# Patient Record
Sex: MALE | Race: WHITE | Employment: FULL TIME | URBAN - NONMETROPOLITAN AREA
[De-identification: names, ages, dates, MRNs, and addresses within clinical notes are randomized per-mention and may not be internally consistent; named-entity substitution may affect disease eponyms.]

---

## 2017-01-19 RX ORDER — LEVETIRACETAM 1000 MG/1
TABLET ORAL
Qty: 60 TABLET | Refills: 5 | Status: SHIPPED | OUTPATIENT
Start: 2017-01-19 | End: 2017-01-23 | Stop reason: SDUPTHER

## 2017-01-23 ENCOUNTER — OFFICE VISIT (OUTPATIENT)
Dept: FAMILY MEDICINE CLINIC | Age: 47
End: 2017-01-23

## 2017-01-23 ENCOUNTER — TELEPHONE (OUTPATIENT)
Dept: NEUROLOGY | Age: 47
End: 2017-01-23

## 2017-01-23 VITALS
RESPIRATION RATE: 16 BRPM | WEIGHT: 243 LBS | HEIGHT: 69 IN | SYSTOLIC BLOOD PRESSURE: 118 MMHG | BODY MASS INDEX: 35.99 KG/M2 | HEART RATE: 63 BPM | OXYGEN SATURATION: 99 % | TEMPERATURE: 98.3 F | DIASTOLIC BLOOD PRESSURE: 70 MMHG

## 2017-01-23 DIAGNOSIS — R56.9 SEIZURE (HCC): ICD-10-CM

## 2017-01-23 DIAGNOSIS — R56.9 SEIZURE (HCC): Primary | ICD-10-CM

## 2017-01-23 LAB
ALBUMIN SERPL-MCNC: 4.4 G/DL (ref 3.5–5.2)
ALP BLD-CCNC: 70 U/L (ref 40–130)
ALT SERPL-CCNC: 24 U/L (ref 5–41)
ANION GAP SERPL CALCULATED.3IONS-SCNC: 14 MMOL/L (ref 7–19)
AST SERPL-CCNC: 24 U/L (ref 5–40)
BILIRUB SERPL-MCNC: 0.6 MG/DL (ref 0.2–1.2)
BUN BLDV-MCNC: 13 MG/DL (ref 6–20)
CALCIUM SERPL-MCNC: 9.1 MG/DL (ref 8.6–10)
CHLORIDE BLD-SCNC: 102 MMOL/L (ref 98–111)
CO2: 27 MMOL/L (ref 22–29)
CREAT SERPL-MCNC: 0.9 MG/DL (ref 0.5–1.2)
GFR NON-AFRICAN AMERICAN: >60
GLOBULIN: 2.8 G/DL
GLUCOSE BLD-MCNC: 96 MG/DL (ref 74–109)
HCT VFR BLD CALC: 44.6 % (ref 42–52)
HEMOGLOBIN: 16 G/DL (ref 14–18)
MCH RBC QN AUTO: 33.1 PG (ref 27–31)
MCHC RBC AUTO-ENTMCNC: 35.9 G/DL (ref 33–37)
MCV RBC AUTO: 92.1 FL (ref 80–94)
PDW BLD-RTO: 13.1 % (ref 11.5–14.5)
PLATELET # BLD: 186 K/UL (ref 130–400)
PMV BLD AUTO: 11.3 FL (ref 7.4–10.4)
POTASSIUM SERPL-SCNC: 4.1 MMOL/L (ref 3.5–5)
RBC # BLD: 4.84 M/UL (ref 4.7–6.1)
SODIUM BLD-SCNC: 143 MMOL/L (ref 136–145)
TOTAL PROTEIN: 7.2 G/DL (ref 6.6–8.7)
WBC # BLD: 9.4 K/UL (ref 4.8–10.8)

## 2017-01-23 PROCEDURE — 99214 OFFICE O/P EST MOD 30 MIN: CPT | Performed by: FAMILY MEDICINE

## 2017-01-23 RX ORDER — LEVETIRACETAM 1000 MG/1
TABLET ORAL
Qty: 60 TABLET | Refills: 5 | Status: SHIPPED | OUTPATIENT
Start: 2017-01-23 | End: 2017-01-23

## 2017-01-23 RX ORDER — LEVETIRACETAM 1000 MG/1
TABLET ORAL
Qty: 60 TABLET | Refills: 5 | Status: SHIPPED | OUTPATIENT
Start: 2017-01-23 | End: 2018-02-19 | Stop reason: SDUPTHER

## 2017-01-23 ASSESSMENT — ENCOUNTER SYMPTOMS
EYES NEGATIVE: 1
RESPIRATORY NEGATIVE: 1
GASTROINTESTINAL NEGATIVE: 1
ALLERGIC/IMMUNOLOGIC NEGATIVE: 1

## 2017-01-25 LAB — KEPPRA: 2 UG/ML (ref 12–46)

## 2017-02-13 DIAGNOSIS — M19.90 OSTEOARTHRITIS, UNSPECIFIED OSTEOARTHRITIS TYPE, UNSPECIFIED SITE: ICD-10-CM

## 2017-02-13 DIAGNOSIS — K21.9 GASTROESOPHAGEAL REFLUX DISEASE WITHOUT ESOPHAGITIS: ICD-10-CM

## 2017-02-13 RX ORDER — RANITIDINE 300 MG/1
TABLET ORAL
Qty: 30 TABLET | Refills: 5 | Status: SHIPPED | OUTPATIENT
Start: 2017-02-13 | End: 2019-04-05

## 2017-02-13 RX ORDER — MELOXICAM 7.5 MG/1
TABLET ORAL
Qty: 30 TABLET | Refills: 5 | Status: SHIPPED | OUTPATIENT
Start: 2017-02-13 | End: 2019-04-05

## 2017-03-03 ENCOUNTER — TELEPHONE (OUTPATIENT)
Dept: NEUROLOGY | Age: 47
End: 2017-03-03

## 2018-02-19 ENCOUNTER — TELEPHONE (OUTPATIENT)
Dept: FAMILY MEDICINE CLINIC | Age: 48
End: 2018-02-19

## 2018-02-19 DIAGNOSIS — R56.9 SEIZURE (HCC): ICD-10-CM

## 2018-02-19 RX ORDER — LEVETIRACETAM 1000 MG/1
TABLET ORAL
Qty: 60 TABLET | Refills: 5 | Status: SHIPPED | OUTPATIENT
Start: 2018-02-19

## 2018-03-06 ENCOUNTER — OFFICE VISIT (OUTPATIENT)
Dept: FAMILY MEDICINE CLINIC | Age: 48
End: 2018-03-06

## 2018-03-06 VITALS
OXYGEN SATURATION: 96 % | WEIGHT: 224 LBS | HEIGHT: 68 IN | BODY MASS INDEX: 33.95 KG/M2 | RESPIRATION RATE: 16 BRPM | HEART RATE: 63 BPM | TEMPERATURE: 97.7 F | SYSTOLIC BLOOD PRESSURE: 120 MMHG | DIASTOLIC BLOOD PRESSURE: 78 MMHG

## 2018-03-06 DIAGNOSIS — R35.0 URINARY FREQUENCY: Primary | ICD-10-CM

## 2018-03-06 DIAGNOSIS — R56.9 SEIZURE (HCC): ICD-10-CM

## 2018-03-06 DIAGNOSIS — R35.0 URINARY FREQUENCY: ICD-10-CM

## 2018-03-06 LAB
ALBUMIN SERPL-MCNC: 4.3 G/DL (ref 3.5–5.2)
ALP BLD-CCNC: 86 U/L (ref 40–130)
ALT SERPL-CCNC: 19 U/L (ref 5–41)
ANION GAP SERPL CALCULATED.3IONS-SCNC: 15 MMOL/L (ref 7–19)
AST SERPL-CCNC: 15 U/L (ref 5–40)
BILIRUB SERPL-MCNC: 0.6 MG/DL (ref 0.2–1.2)
BUN BLDV-MCNC: 15 MG/DL (ref 6–20)
CALCIUM SERPL-MCNC: 9.2 MG/DL (ref 8.6–10)
CHLORIDE BLD-SCNC: 101 MMOL/L (ref 98–111)
CO2: 28 MMOL/L (ref 22–29)
CREAT SERPL-MCNC: 0.8 MG/DL (ref 0.5–1.2)
GFR NON-AFRICAN AMERICAN: >60
GLUCOSE BLD-MCNC: 98 MG/DL (ref 74–109)
HBA1C MFR BLD: 5 %
HCT VFR BLD CALC: 44.1 % (ref 42–52)
HEMOGLOBIN: 15.6 G/DL (ref 14–18)
MCH RBC QN AUTO: 33.2 PG (ref 27–31)
MCHC RBC AUTO-ENTMCNC: 35.4 G/DL (ref 33–37)
MCV RBC AUTO: 93.8 FL (ref 80–94)
PDW BLD-RTO: 12.4 % (ref 11.5–14.5)
PLATELET # BLD: 156 K/UL (ref 130–400)
PMV BLD AUTO: 11.3 FL (ref 9.4–12.4)
POTASSIUM SERPL-SCNC: 3.9 MMOL/L (ref 3.5–5)
PROSTATE SPECIFIC ANTIGEN: 0.69 NG/ML (ref 0–4)
RBC # BLD: 4.7 M/UL (ref 4.7–6.1)
SODIUM BLD-SCNC: 144 MMOL/L (ref 136–145)
TOTAL PROTEIN: 7.1 G/DL (ref 6.6–8.7)
WBC # BLD: 11.2 K/UL (ref 4.8–10.8)

## 2018-03-06 PROCEDURE — 99213 OFFICE O/P EST LOW 20 MIN: CPT | Performed by: FAMILY MEDICINE

## 2018-03-06 RX ORDER — LEVETIRACETAM 1000 MG/1
TABLET ORAL
Qty: 60 TABLET | Refills: 5 | Status: SHIPPED | OUTPATIENT
Start: 2018-03-06 | End: 2019-04-05

## 2018-03-06 ASSESSMENT — ENCOUNTER SYMPTOMS
GASTROINTESTINAL NEGATIVE: 1
RESPIRATORY NEGATIVE: 1
EYES NEGATIVE: 1
ALLERGIC/IMMUNOLOGIC NEGATIVE: 1

## 2018-03-06 NOTE — PROGRESS NOTES
OBJECTIVE:    Physical Exam   Constitutional: He is oriented to person, place, and time. He appears well-developed and well-nourished. HENT:   Head: Normocephalic and atraumatic. Right Ear: External ear normal.   Left Ear: External ear normal.   Nose: Nose normal.   Mouth/Throat: Oropharynx is clear and moist.   Eyes: Conjunctivae and EOM are normal. Pupils are equal, round, and reactive to light. Neck: Normal range of motion. Neck supple. Cardiovascular: Normal rate, regular rhythm, S1 normal, S2 normal, normal heart sounds, intact distal pulses and normal pulses. Pulmonary/Chest: Effort normal and breath sounds normal. No apnea. Abdominal: Soft. Normal appearance. Musculoskeletal: Normal range of motion. Neurological: He is alert and oriented to person, place, and time. He has normal strength and normal reflexes. Skin: Skin is warm, dry and intact. Psychiatric: He has a normal mood and affect. His speech is normal and behavior is normal. Judgment and thought content normal. Cognition and memory are normal.   Vitals reviewed. /78 (Site: Right Arm, Position: Sitting, Cuff Size: Medium Adult)   Pulse 63   Temp 97.7 °F (36.5 °C) (Oral)   Resp 16   Ht 5' 8\" (1.727 m)   Wt 224 lb (101.6 kg)   SpO2 96%   BMI 34.06 kg/m²      ASSESSMENT:    1. Urinary frequency overactive bladder versus BPH versus diabetes  Hemoglobin A1C    Urinalysis    PSA, Diagnostic   2. Seizure ([address] Stable  levETIRAcetam (KEPPRA) 1000 MG tablet    CBC    Comprehensive Metabolic Panel    Levetiracetam Level        PLAN:    1. Hemoglobin A1c UA and PSA. Avoid caffeine. Return to the clinic if not improved in by discontinuation of caffeine. 2. Keppra level. Refill Keppra blood work.   Follow-up in 6 months or earlier when necessary

## 2018-03-08 LAB — KEPPRA: 5 UG/ML (ref 12–46)

## 2018-05-07 ENCOUNTER — HOSPITAL ENCOUNTER (EMERGENCY)
Facility: HOSPITAL | Age: 48
Discharge: HOME | End: 2018-05-07
Admitting: EMERGENCY MEDICINE

## 2018-05-07 ENCOUNTER — APPOINTMENT (OUTPATIENT)
Dept: GENERAL RADIOLOGY | Facility: HOSPITAL | Age: 48
End: 2018-05-07

## 2018-05-07 VITALS
DIASTOLIC BLOOD PRESSURE: 61 MMHG | HEART RATE: 48 BPM | OXYGEN SATURATION: 100 % | RESPIRATION RATE: 17 BRPM | WEIGHT: 224 LBS | SYSTOLIC BLOOD PRESSURE: 110 MMHG | HEIGHT: 68 IN | BODY MASS INDEX: 33.95 KG/M2 | TEMPERATURE: 98.3 F

## 2018-05-07 DIAGNOSIS — S39.012A STRAIN OF LUMBAR REGION, INITIAL ENCOUNTER: Primary | ICD-10-CM

## 2018-05-07 PROCEDURE — 99283 EMERGENCY DEPT VISIT LOW MDM: CPT

## 2018-05-07 PROCEDURE — 25010000002 METHYLPREDNISOLONE PER 125 MG: Performed by: PHYSICIAN ASSISTANT

## 2018-05-07 PROCEDURE — 25010000002 KETOROLAC TROMETHAMINE PER 15 MG: Performed by: PHYSICIAN ASSISTANT

## 2018-05-07 PROCEDURE — 72110 X-RAY EXAM L-2 SPINE 4/>VWS: CPT

## 2018-05-07 PROCEDURE — 96372 THER/PROPH/DIAG INJ SC/IM: CPT

## 2018-05-07 RX ORDER — KETOROLAC TROMETHAMINE 15 MG/ML
15 INJECTION, SOLUTION INTRAMUSCULAR; INTRAVENOUS ONCE
Status: COMPLETED | OUTPATIENT
Start: 2018-05-07 | End: 2018-05-07

## 2018-05-07 RX ORDER — METHYLPREDNISOLONE 4 MG/1
TABLET ORAL
Qty: 21 EACH | Refills: 0 | Status: SHIPPED | OUTPATIENT
Start: 2018-05-07 | End: 2022-07-14

## 2018-05-07 RX ORDER — METHYLPREDNISOLONE SODIUM SUCCINATE 125 MG/2ML
125 INJECTION, POWDER, LYOPHILIZED, FOR SOLUTION INTRAMUSCULAR; INTRAVENOUS ONCE
Status: COMPLETED | OUTPATIENT
Start: 2018-05-07 | End: 2018-05-07

## 2018-05-07 RX ORDER — DICLOFENAC SODIUM 75 MG/1
75 TABLET, DELAYED RELEASE ORAL 2 TIMES DAILY
Qty: 14 TABLET | Refills: 0 | Status: SHIPPED | OUTPATIENT
Start: 2018-05-07 | End: 2022-07-14

## 2018-05-07 RX ADMIN — METHYLPREDNISOLONE SODIUM SUCCINATE 125 MG: 125 INJECTION, POWDER, FOR SOLUTION INTRAMUSCULAR; INTRAVENOUS at 14:02

## 2018-05-07 RX ADMIN — KETOROLAC TROMETHAMINE 15 MG: 15 INJECTION, SOLUTION INTRAMUSCULAR; INTRAVENOUS at 14:01

## 2018-05-07 NOTE — ED PROVIDER NOTES
Subjective   47-year-old male presents chief complaint of right-sided low back pain.  Patient notes symptoms began 4 days ago while he was at work and have progressively gotten worse.  Patient reports a history of this type of pain however does not usually last 4 days.  Pain is worse with movement and better with rest.  No numbness or tingling or shooting pain down his leg no saddle anesthesia and no bowel or bladder incontinence            Review of Systems   All other systems reviewed and are negative.      Past Medical History:   Diagnosis Date   • Arthritis    • GERD (gastroesophageal reflux disease)    • Seizures        No Known Allergies    History reviewed. No pertinent surgical history.    Family History   Problem Relation Age of Onset   • No Known Problems Mother    • No Known Problems Father    • No Known Problems Sister    • No Known Problems Brother    • No Known Problems Son    • No Known Problems Daughter    • No Known Problems Maternal Grandmother    • No Known Problems Maternal Grandfather    • No Known Problems Paternal Grandmother    • No Known Problems Paternal Grandfather    • No Known Problems Cousin    • Rheum arthritis Neg Hx    • Osteoarthritis Neg Hx    • Asthma Neg Hx    • Diabetes Neg Hx    • Heart failure Neg Hx    • Hyperlipidemia Neg Hx    • Hypertension Neg Hx    • Migraines Neg Hx    • Rashes / Skin problems Neg Hx    • Seizures Neg Hx    • Stroke Neg Hx    • Thyroid disease Neg Hx        Social History     Social History   • Marital status: Single     Social History Main Topics   • Smoking status: Current Every Day Smoker     Packs/day: 1.00     Types: Cigarettes   • Alcohol use Yes      Comment: OCCASIONAL   • Drug use: Unknown   • Sexual activity: Defer     Other Topics Concern   • Not on file           Objective   Physical Exam   Constitutional: He is oriented to person, place, and time. He appears well-developed and well-nourished.   HENT:   Head: Normocephalic.   Eyes: EOM are  normal. Pupils are equal, round, and reactive to light.   Neck: Normal range of motion. Neck supple.   Cardiovascular: Normal rate and regular rhythm.    Pulmonary/Chest: Effort normal and breath sounds normal.   Abdominal: Soft. Bowel sounds are normal.   Musculoskeletal: Normal range of motion. He exhibits no tenderness or deformity.   Mild paraspinal lumbar tenderness right side   Lymphadenopathy:     He has no cervical adenopathy.   Neurological: He is alert and oriented to person, place, and time.   Skin: Skin is warm and dry.   Psychiatric: He has a normal mood and affect. His behavior is normal.   Nursing note and vitals reviewed.      Procedures           ED Course  ED Course                  MDM  Number of Diagnoses or Management Options  Diagnosis management comments: Negative plain film, will dc with oral anti inflammatories and steroids        Amount and/or Complexity of Data Reviewed  Tests in the radiology section of CPT®: ordered and reviewed    Risk of Complications, Morbidity, and/or Mortality  Presenting problems: moderate  Diagnostic procedures: moderate  Management options: moderate    Patient Progress  Patient progress: stable        Final diagnoses:   Strain of lumbar region, initial encounter            Mike Liu PA-C  05/07/18 1454       Mike Liu PA-C  05/26/18 8614

## 2018-05-07 NOTE — ED NOTES
Pt complains of right side lower back pain. Pt denies numbness or tingling down legs. Pt states lifting some boxes Friday at work when the pain began. Pt states he has had this pain before but it is worse this time.     Julita Collins RN  05/07/18 8107

## 2019-04-05 ENCOUNTER — OFFICE VISIT (OUTPATIENT)
Dept: FAMILY MEDICINE CLINIC | Age: 49
End: 2019-04-05

## 2019-04-05 VITALS
OXYGEN SATURATION: 98 % | SYSTOLIC BLOOD PRESSURE: 100 MMHG | HEART RATE: 60 BPM | WEIGHT: 250 LBS | RESPIRATION RATE: 16 BRPM | TEMPERATURE: 98.3 F | BODY MASS INDEX: 38.01 KG/M2 | DIASTOLIC BLOOD PRESSURE: 64 MMHG

## 2019-04-05 DIAGNOSIS — G40.909 SEIZURE DISORDER (HCC): Primary | ICD-10-CM

## 2019-04-05 PROCEDURE — 99212 OFFICE O/P EST SF 10 MIN: CPT | Performed by: NURSE PRACTITIONER

## 2019-04-05 RX ORDER — OMEPRAZOLE 20 MG/1
20 CAPSULE, DELAYED RELEASE ORAL
Qty: 30 CAPSULE | Refills: 5 | Status: SHIPPED | OUTPATIENT
Start: 2019-04-05

## 2019-04-05 ASSESSMENT — PATIENT HEALTH QUESTIONNAIRE - PHQ9
SUM OF ALL RESPONSES TO PHQ QUESTIONS 1-9: 0
1. LITTLE INTEREST OR PLEASURE IN DOING THINGS: 0
SUM OF ALL RESPONSES TO PHQ9 QUESTIONS 1 & 2: 0
2. FEELING DOWN, DEPRESSED OR HOPELESS: 0
SUM OF ALL RESPONSES TO PHQ QUESTIONS 1-9: 0

## 2019-04-05 NOTE — LETTER
[address] [address]  Phone: [phone]  Fax: [phone]    NYA Smith        April 5, 2019     Patient: Allan Sheehan   YOB: 1970   Date of Visit: 4/5/2019       To Whom it May Concern:    Terence Acuna was seen in my clinic on 4/5/2019. He may return to work on 4/6/19. If you have any questions or concerns, please don't hesitate to call.     Sincerely,         NYA Smith
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
## 2019-04-18 DIAGNOSIS — R56.9 SEIZURE (HCC): ICD-10-CM

## 2019-04-22 RX ORDER — LEVETIRACETAM 1000 MG/1
TABLET ORAL
Qty: 60 TABLET | Refills: 5 | Status: SHIPPED | OUTPATIENT
Start: 2019-04-22

## 2022-07-14 ENCOUNTER — OFFICE VISIT (OUTPATIENT)
Dept: FAMILY MEDICINE CLINIC | Facility: CLINIC | Age: 52
End: 2022-07-14

## 2022-07-14 VITALS
SYSTOLIC BLOOD PRESSURE: 115 MMHG | HEART RATE: 51 BPM | HEIGHT: 68 IN | DIASTOLIC BLOOD PRESSURE: 76 MMHG | BODY MASS INDEX: 38.3 KG/M2 | OXYGEN SATURATION: 98 % | WEIGHT: 252.7 LBS | TEMPERATURE: 96.9 F

## 2022-07-14 DIAGNOSIS — E66.9 OBESITY (BMI 30-39.9): ICD-10-CM

## 2022-07-14 DIAGNOSIS — R53.83 FATIGUE, UNSPECIFIED TYPE: ICD-10-CM

## 2022-07-14 DIAGNOSIS — B35.1 OM (ONYCHOMYCOSIS): ICD-10-CM

## 2022-07-14 DIAGNOSIS — L72.3 SEBACEOUS CYST: ICD-10-CM

## 2022-07-14 DIAGNOSIS — R39.15 URGENCY OF URINATION: ICD-10-CM

## 2022-07-14 DIAGNOSIS — G25.5 MUSCLE, JERKY MOVEMENTS (UNCONTROLLED): ICD-10-CM

## 2022-07-14 DIAGNOSIS — G40.909 NONINTRACTABLE EPILEPSY WITHOUT STATUS EPILEPTICUS, UNSPECIFIED EPILEPSY TYPE: Primary | ICD-10-CM

## 2022-07-14 DIAGNOSIS — Z13.220 SCREENING, LIPID: ICD-10-CM

## 2022-07-14 DIAGNOSIS — R00.1 BRADYCARDIA: ICD-10-CM

## 2022-07-14 DIAGNOSIS — R94.31 ABNORMAL EKG: ICD-10-CM

## 2022-07-14 DIAGNOSIS — G47.00 INSOMNIA, UNSPECIFIED TYPE: ICD-10-CM

## 2022-07-14 DIAGNOSIS — F17.200 SMOKER: ICD-10-CM

## 2022-07-14 DIAGNOSIS — Z83.3 FAMILY HISTORY OF DIABETES MELLITUS: ICD-10-CM

## 2022-07-14 DIAGNOSIS — Q18.1 AURICULAR CYST: ICD-10-CM

## 2022-07-14 DIAGNOSIS — F32.1 MODERATE MAJOR DEPRESSION: ICD-10-CM

## 2022-07-14 DIAGNOSIS — Z79.899 HIGH RISK MEDICATION USE: ICD-10-CM

## 2022-07-14 DIAGNOSIS — Z12.5 SCREENING PSA (PROSTATE SPECIFIC ANTIGEN): ICD-10-CM

## 2022-07-14 PROCEDURE — 93005 ELECTROCARDIOGRAM TRACING: CPT | Performed by: FAMILY MEDICINE

## 2022-07-14 PROCEDURE — 99204 OFFICE O/P NEW MOD 45 MIN: CPT | Performed by: FAMILY MEDICINE

## 2022-07-14 RX ORDER — ZONISAMIDE 100 MG/1
300 CAPSULE ORAL
COMMUNITY
Start: 2022-05-15 | End: 2022-07-14 | Stop reason: SDUPTHER

## 2022-07-14 RX ORDER — CYCLOBENZAPRINE HCL 10 MG
10 TABLET ORAL NIGHTLY PRN
Qty: 30 TABLET | Refills: 5 | Status: SHIPPED | OUTPATIENT
Start: 2022-07-14 | End: 2023-01-09 | Stop reason: SDUPTHER

## 2022-07-14 RX ORDER — TRAZODONE HYDROCHLORIDE 100 MG/1
100 TABLET ORAL NIGHTLY
Qty: 30 TABLET | Refills: 5 | Status: SHIPPED | OUTPATIENT
Start: 2022-07-14 | End: 2023-01-07 | Stop reason: SDUPTHER

## 2022-07-14 RX ORDER — ZONISAMIDE 100 MG/1
300 CAPSULE ORAL
Qty: 90 CAPSULE | Refills: 1 | Status: SHIPPED | OUTPATIENT
Start: 2022-07-14 | End: 2022-08-23 | Stop reason: SDUPTHER

## 2022-07-14 RX ORDER — LEVETIRACETAM 1000 MG/1
1000 TABLET ORAL 2 TIMES DAILY
COMMUNITY
End: 2022-07-29 | Stop reason: SDUPTHER

## 2022-07-14 RX ORDER — CYCLOBENZAPRINE HCL 10 MG
10 TABLET ORAL
COMMUNITY
Start: 2022-05-15 | End: 2022-07-14 | Stop reason: SDUPTHER

## 2022-07-14 RX ORDER — CITALOPRAM 20 MG/1
TABLET ORAL
COMMUNITY
Start: 2022-06-12 | End: 2022-08-23

## 2022-07-14 NOTE — PATIENT INSTRUCTIONS
- Use up the March bottle of Citalopram 20 mg a day first and then the June bottle.  We will get refills on it once you are out of the current supply.    - Take the cyclobenzaprine ( flexeril)  as needed for muscle spasms or jerking and use the trazodone 100 mg every night for mood and sleep     - Try to cut down to 15 cigarettes a day by the time you have your 6 week follow up here  - Walk daily for 20 minutes to help with breathing and weight loss.  You will sleep better with regular exercise.  - Continue the 2000 mg of the Keppra twice a day for now until you see neurology.  - See the ENT about the cyst on the back of the neck and at the front of the left ear.     Please fast for your upcoming labs.  Fasting means having no juice, no milk, and no food, but you can have anything with zero calories such as water, black coffee, unsweet tea or diet soda while you are fasting.  We recommend 12 hours of fasting before the labs but if you can't do that, then 8-10 hours is acceptable. There is no need for an appointment for the labwork; the main lab is open 6 AM to 5 PM Monday through Friday on the first floor in the [facility] [place], operated by [facility].  Go to the main entrance.  Make sure you drink lots of water before the labs are done so it's easier for them to draw the blood and for you to urinate if urine tests have been ordered.
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
## 2022-07-15 ENCOUNTER — TELEPHONE (OUTPATIENT)
Dept: FAMILY MEDICINE CLINIC | Facility: CLINIC | Age: 52
End: 2022-07-15

## 2022-07-27 ENCOUNTER — OFFICE VISIT (OUTPATIENT)
Dept: CARDIOLOGY | Facility: CLINIC | Age: 52
End: 2022-07-27

## 2022-07-27 ENCOUNTER — LAB (OUTPATIENT)
Dept: LAB | Facility: HOSPITAL | Age: 52
End: 2022-07-27

## 2022-07-27 VITALS
SYSTOLIC BLOOD PRESSURE: 110 MMHG | OXYGEN SATURATION: 98 % | DIASTOLIC BLOOD PRESSURE: 74 MMHG | WEIGHT: 252 LBS | BODY MASS INDEX: 38.19 KG/M2 | HEART RATE: 87 BPM | HEIGHT: 68 IN

## 2022-07-27 DIAGNOSIS — R00.1 BRADYCARDIA, SINUS: Primary | ICD-10-CM

## 2022-07-27 DIAGNOSIS — E66.9 OBESITY (BMI 30-39.9): ICD-10-CM

## 2022-07-27 DIAGNOSIS — G40.909 NONINTRACTABLE EPILEPSY WITHOUT STATUS EPILEPTICUS, UNSPECIFIED EPILEPSY TYPE: ICD-10-CM

## 2022-07-27 DIAGNOSIS — G25.5 MUSCLE, JERKY MOVEMENTS (UNCONTROLLED): ICD-10-CM

## 2022-07-27 DIAGNOSIS — Z79.899 HIGH RISK MEDICATION USE: ICD-10-CM

## 2022-07-27 DIAGNOSIS — R94.31 EKG ABNORMALITIES: ICD-10-CM

## 2022-07-27 DIAGNOSIS — R53.83 FATIGUE, UNSPECIFIED TYPE: ICD-10-CM

## 2022-07-27 DIAGNOSIS — Z83.3 FAMILY HISTORY OF DIABETES MELLITUS: ICD-10-CM

## 2022-07-27 DIAGNOSIS — R00.1 BRADYCARDIA: ICD-10-CM

## 2022-07-27 DIAGNOSIS — Z12.5 SCREENING PSA (PROSTATE SPECIFIC ANTIGEN): ICD-10-CM

## 2022-07-27 DIAGNOSIS — E66.2 CLASS 2 OBESITY WITH ALVEOLAR HYPOVENTILATION AND BODY MASS INDEX (BMI) OF 38.0 TO 38.9 IN ADULT, UNSPECIFIED WHETHER SERIOUS COMORBIDITY PRESENT: ICD-10-CM

## 2022-07-27 DIAGNOSIS — Z13.220 SCREENING, LIPID: ICD-10-CM

## 2022-07-27 DIAGNOSIS — R39.15 URGENCY OF URINATION: ICD-10-CM

## 2022-07-27 DIAGNOSIS — Z72.0 TOBACCO ABUSE: ICD-10-CM

## 2022-07-27 PROBLEM — E66.812 CLASS 2 OBESITY WITH ALVEOLAR HYPOVENTILATION AND BODY MASS INDEX (BMI) OF 38.0 TO 38.9 IN ADULT: Status: ACTIVE | Noted: 2022-07-27

## 2022-07-27 LAB
25(OH)D3 SERPL-MCNC: 21.9 NG/ML (ref 30–100)
ALBUMIN SERPL-MCNC: 4.2 G/DL (ref 3.5–5.2)
ALBUMIN UR-MCNC: <1.2 MG/DL
ALBUMIN/GLOB SERPL: 2.1 G/DL
ALP SERPL-CCNC: 80 U/L (ref 39–117)
ALT SERPL W P-5'-P-CCNC: 28 U/L (ref 1–41)
ANION GAP SERPL CALCULATED.3IONS-SCNC: 9.6 MMOL/L (ref 5–15)
AST SERPL-CCNC: 21 U/L (ref 1–40)
BILIRUB SERPL-MCNC: 0.5 MG/DL (ref 0–1.2)
BILIRUB UR QL STRIP: NEGATIVE
BUN SERPL-MCNC: 11 MG/DL (ref 6–20)
BUN/CREAT SERPL: 10.7 (ref 7–25)
CALCIUM SPEC-SCNC: 8.7 MG/DL (ref 8.6–10.5)
CHLORIDE SERPL-SCNC: 107 MMOL/L (ref 98–107)
CHOLEST SERPL-MCNC: 141 MG/DL (ref 0–200)
CLARITY UR: CLEAR
CO2 SERPL-SCNC: 25.4 MMOL/L (ref 22–29)
COLOR UR: YELLOW
CREAT SERPL-MCNC: 1.03 MG/DL (ref 0.76–1.27)
DEPRECATED RDW RBC AUTO: 42.8 FL (ref 37–54)
EGFRCR SERPLBLD CKD-EPI 2021: 87.4 ML/MIN/1.73
ERYTHROCYTE [DISTWIDTH] IN BLOOD BY AUTOMATED COUNT: 12.5 % (ref 12.3–15.4)
GLOBULIN UR ELPH-MCNC: 2 GM/DL
GLUCOSE SERPL-MCNC: 104 MG/DL (ref 65–99)
GLUCOSE UR STRIP-MCNC: NEGATIVE MG/DL
HBA1C MFR BLD: 5.2 % (ref 4.8–5.6)
HCT VFR BLD AUTO: 43.8 % (ref 37.5–51)
HDLC SERPL-MCNC: 33 MG/DL (ref 40–60)
HGB BLD-MCNC: 15.7 G/DL (ref 13–17.7)
HGB UR QL STRIP.AUTO: NEGATIVE
IRON 24H UR-MRATE: 126 MCG/DL (ref 59–158)
IRON SATN MFR SERPL: 39 % (ref 20–50)
KETONES UR QL STRIP: NEGATIVE
LDLC SERPL CALC-MCNC: 86 MG/DL (ref 0–100)
LDLC/HDLC SERPL: 2.55 {RATIO}
LEUKOCYTE ESTERASE UR QL STRIP.AUTO: NEGATIVE
MCH RBC QN AUTO: 33.6 PG (ref 26.6–33)
MCHC RBC AUTO-ENTMCNC: 35.8 G/DL (ref 31.5–35.7)
MCV RBC AUTO: 93.8 FL (ref 79–97)
NITRITE UR QL STRIP: NEGATIVE
PH UR STRIP.AUTO: 7 [PH] (ref 5–8)
PLATELET # BLD AUTO: 169 10*3/MM3 (ref 140–450)
PMV BLD AUTO: 10.8 FL (ref 6–12)
POTASSIUM SERPL-SCNC: 3.9 MMOL/L (ref 3.5–5.2)
PROT SERPL-MCNC: 6.2 G/DL (ref 6–8.5)
PROT UR QL STRIP: NEGATIVE
PSA SERPL-MCNC: 0.37 NG/ML (ref 0–4)
RBC # BLD AUTO: 4.67 10*6/MM3 (ref 4.14–5.8)
SODIUM SERPL-SCNC: 142 MMOL/L (ref 136–145)
SP GR UR STRIP: 1.02 (ref 1–1.03)
TIBC SERPL-MCNC: 320 MCG/DL (ref 298–536)
TRANSFERRIN SERPL-MCNC: 215 MG/DL (ref 200–360)
TRIGL SERPL-MCNC: 119 MG/DL (ref 0–150)
TSH SERPL DL<=0.05 MIU/L-ACNC: 2.4 UIU/ML (ref 0.27–4.2)
UROBILINOGEN UR QL STRIP: NORMAL
VIT B12 BLD-MCNC: 347 PG/ML (ref 211–946)
VLDLC SERPL-MCNC: 22 MG/DL (ref 5–40)
WBC NRBC COR # BLD: 7.71 10*3/MM3 (ref 3.4–10.8)

## 2022-07-27 PROCEDURE — 84443 ASSAY THYROID STIM HORMONE: CPT

## 2022-07-27 PROCEDURE — 83036 HEMOGLOBIN GLYCOSYLATED A1C: CPT

## 2022-07-27 PROCEDURE — 83540 ASSAY OF IRON: CPT

## 2022-07-27 PROCEDURE — 84466 ASSAY OF TRANSFERRIN: CPT

## 2022-07-27 PROCEDURE — 80053 COMPREHEN METABOLIC PANEL: CPT

## 2022-07-27 PROCEDURE — 93000 ELECTROCARDIOGRAM COMPLETE: CPT | Performed by: INTERNAL MEDICINE

## 2022-07-27 PROCEDURE — 80203 DRUG SCREEN QUANT ZONISAMIDE: CPT

## 2022-07-27 PROCEDURE — 36415 COLL VENOUS BLD VENIPUNCTURE: CPT

## 2022-07-27 PROCEDURE — 82306 VITAMIN D 25 HYDROXY: CPT

## 2022-07-27 PROCEDURE — 80177 DRUG SCRN QUAN LEVETIRACETAM: CPT

## 2022-07-27 PROCEDURE — 99204 OFFICE O/P NEW MOD 45 MIN: CPT | Performed by: INTERNAL MEDICINE

## 2022-07-27 PROCEDURE — G0103 PSA SCREENING: HCPCS

## 2022-07-27 PROCEDURE — 81003 URINALYSIS AUTO W/O SCOPE: CPT

## 2022-07-27 PROCEDURE — 82043 UR ALBUMIN QUANTITATIVE: CPT

## 2022-07-27 PROCEDURE — 82607 VITAMIN B-12: CPT

## 2022-07-27 PROCEDURE — 80061 LIPID PANEL: CPT

## 2022-07-27 PROCEDURE — 85027 COMPLETE CBC AUTOMATED: CPT

## 2022-07-27 NOTE — PROGRESS NOTES
"Subjective    Mayito Bassett is a 51 y.o. male.  - referred b pcp for abn EKG and sbrad    History of Present Illness     ABN EKG - SINUS BRADYCARDIA:  Recent fu with pcp EKG shows HR=47 and poor-R progression across the precordium. He has never had known heart problems in the past. 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 He has had no decline in stamina and no cp or unusual soa. His energy level has been \"down\" recently. Labs are pending with this pcp.     TOBACCO ABUSE:  He is a 3ppd smoker. This issus is being addressed by his pcp.    OBESITY:  Wt is stable. He snores but does not have excessive daytime somnolence    The following portions of the patient's history were reviewed and updated as appropriate: allergies, current medications, past family history, past medical history, past social history, past surgical history and problem list.    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 (HCC)       No Known Allergies    Family History   Problem Relation Age of Onset   • Heart disease Mother    • Kidney disease Mother    • Diabetes Mother    • No Known Problems Sister    • No Known Problems Brother    • No Known Problems Maternal Grandmother    • No Known Problems Maternal Grandfather    • No Known Problems Paternal Grandmother    • No Known Problems Paternal Grandfather    • No Known Problems Daughter    • No Known Problems Son    • No Known Problems Cousin    • Rheum arthritis Neg Hx    • Osteoarthritis Neg Hx    • Asthma Neg Hx    • Heart failure Neg Hx    • Hyperlipidemia Neg Hx    • Hypertension Neg Hx    • Migraines Neg Hx    • Rashes / Skin problems Neg Hx    • Seizures Neg Hx    • Stroke Neg Hx    • Thyroid disease Neg Hx        Social History     Socioeconomic History   • Marital status: Single " "  Tobacco Use   • Smoking status: Heavy Tobacco Smoker     Packs/day: 3.00     Types: Cigarettes   • Smokeless tobacco: Never Used   Vaping Use   • Vaping Use: Never used   Substance and Sexual Activity   • Alcohol use: Yes     Comment: OCCASIONAL   • Drug use: Defer   • Sexual activity: Not Currently     Partners: Female         Current Outpatient Medications:   •  aspirin 81 MG EC tablet, Take 81 mg by mouth Daily., Disp: , Rfl:   •  citalopram (CeleXA) 20 MG tablet, , Disp: , Rfl:   •  cyclobenzaprine (FLEXERIL) 10 MG tablet, Take 1 tablet by mouth At Night As Needed for Muscle Spasms., Disp: 30 tablet, Rfl: 5  •  levETIRAcetam (KEPPRA) 1000 MG tablet, Take 1,000 mg by mouth 2 (Two) Times a Day., Disp: , Rfl:   •  Multiple Vitamin (MULTI VITAMIN MENS PO), Take  by mouth., Disp: , Rfl:   •  traZODone (DESYREL) 100 MG tablet, Take 1 tablet by mouth Every Night. For sleep and mood, Disp: 30 tablet, Rfl: 5  •  zonisamide (ZONEGRAN) 100 MG capsule, Take 3 capsules by mouth every night at bedtime. For prevention of seizures, Disp: 90 capsule, Rfl: 1    History reviewed. No pertinent surgical history.    Review of Systems   Constitutional: Positive for fatigue. Negative for activity change, appetite change and unexpected weight change.   Respiratory: Positive for cough, shortness of breath and wheezing.    Cardiovascular: Negative for chest pain, palpitations and leg swelling.   Gastrointestinal: Negative for abdominal pain and blood in stool.   Genitourinary: Positive for urgency. Negative for difficulty urinating.   Musculoskeletal: Negative for arthralgias and back pain.   Psychiatric/Behavioral: Positive for sleep disturbance.       /74   Pulse 87   Ht 172.7 cm (67.99\")   Wt 114 kg (252 lb)   SpO2 98%   BMI 38.33 kg/m²   Procedures    Objective   Physical Exam  Constitutional:       Appearance: He is obese.      Comments: Reeks of tobacco smoke   Cardiovascular:      Rate and Rhythm: Bradycardia present. "      Heart sounds: Normal heart sounds. No murmur heard.    No friction rub. No gallop.   Pulmonary:      Effort: Pulmonary effort is normal. No respiratory distress.      Breath sounds: Normal breath sounds. No wheezing or rales.   Abdominal:      General: Bowel sounds are normal.      Tenderness: There is no abdominal tenderness.   Musculoskeletal:      Right lower leg: No edema.      Left lower leg: No edema.   Skin:     General: Skin is warm.   Neurological:      General: No focal deficit present.   Psychiatric:         Mood and Affect: Mood normal.         Assessment & Plan   Diagnoses and all orders for this visit:    1. Bradycardia, sinus (Primary)  Comments:  check Holter and overnight ox  Orders:  -     ECG 12 Lead  -     Overnight Sleep Oximetry Study  -     Holter Monitor - 48 Hour; Future    2. EKG abnormalities  Comments:  check stress test  Orders:  -     Adult Stress Echo W/ Cont or Stress Agent if Necessary Per Protocol    3. Tobacco abuse  Comments:  being counseled by his pcp    4. 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  Orders Placed This Encounter   Procedures   • Overnight Sleep Oximetry Study   • Holter Monitor - 48 Hour     Standing Status:   Future     Standing Expiration Date:   7/27/2023     Order Specific Question:   Reason for exam?     Answer:   Other     Order Specific Question:   Other reason?     Answer:   SINUS BRADYCARIDIA     Order Specific Question:   Release to patient     Answer:   Immediate   • ECG 12 Lead     Order Specific Question:   Reason for Exam:     Answer:   BRADYCARDIA     Order Specific Question:   Release to patient     Answer:   Immediate   • Adult Stress Echo W/ Cont or Stress Agent if Necessary Per Protocol     Order  Specific Question:   What stress agent will be used?     Answer:   Exercise with [employer]     Order Specific Question:   Reason for exam?     Answer:   Abnormal EKG

## 2022-07-28 ENCOUNTER — HOSPITAL ENCOUNTER (OUTPATIENT)
Dept: CARDIOLOGY | Facility: HOSPITAL | Age: 52
Discharge: HOME | End: 2022-07-28
Admitting: INTERNAL MEDICINE

## 2022-07-28 DIAGNOSIS — R00.1 BRADYCARDIA, SINUS: ICD-10-CM

## 2022-07-28 LAB — ZONISAMIDE SERPL-MCNC: 14.8 UG/ML (ref 10–40)

## 2022-07-28 PROCEDURE — 93225 XTRNL ECG REC<48 HRS REC: CPT

## 2022-07-29 ENCOUNTER — OFFICE VISIT (OUTPATIENT)
Dept: FAMILY MEDICINE CLINIC | Facility: CLINIC | Age: 52
End: 2022-07-29

## 2022-07-29 VITALS
SYSTOLIC BLOOD PRESSURE: 120 MMHG | HEART RATE: 52 BPM | DIASTOLIC BLOOD PRESSURE: 80 MMHG | TEMPERATURE: 97.2 F | HEIGHT: 68 IN | OXYGEN SATURATION: 98 % | WEIGHT: 261 LBS | BODY MASS INDEX: 39.56 KG/M2

## 2022-07-29 DIAGNOSIS — R32 URINARY INCONTINENCE, UNSPECIFIED TYPE: Primary | ICD-10-CM

## 2022-07-29 DIAGNOSIS — G40.909 NONINTRACTABLE EPILEPSY WITHOUT STATUS EPILEPTICUS, UNSPECIFIED EPILEPSY TYPE: ICD-10-CM

## 2022-07-29 DIAGNOSIS — N39.43 POST-VOID DRIBBLING: ICD-10-CM

## 2022-07-29 DIAGNOSIS — G25.5 MUSCLE, JERKY MOVEMENTS (UNCONTROLLED): ICD-10-CM

## 2022-07-29 DIAGNOSIS — R00.1 BRADYCARDIA, SINUS: ICD-10-CM

## 2022-07-29 DIAGNOSIS — R35.1 NOCTURIA: ICD-10-CM

## 2022-07-29 DIAGNOSIS — E55.9 VITAMIN D DEFICIENCY: ICD-10-CM

## 2022-07-29 DIAGNOSIS — F17.200 SMOKER: ICD-10-CM

## 2022-07-29 DIAGNOSIS — K21.9 GASTROESOPHAGEAL REFLUX DISEASE, UNSPECIFIED WHETHER ESOPHAGITIS PRESENT: ICD-10-CM

## 2022-07-29 DIAGNOSIS — M54.9 DORSALGIA: ICD-10-CM

## 2022-07-29 DIAGNOSIS — R74.8 LOW SERUM HDL: ICD-10-CM

## 2022-07-29 PROCEDURE — 99215 OFFICE O/P EST HI 40 MIN: CPT | Performed by: FAMILY MEDICINE

## 2022-07-29 RX ORDER — CHOLECALCIFEROL (VITAMIN D3) 1250 MCG
50000 CAPSULE ORAL WEEKLY
Qty: 5 CAPSULE | Refills: 5 | Status: SHIPPED | OUTPATIENT
Start: 2022-07-29 | End: 2022-11-11

## 2022-07-29 RX ORDER — FAMOTIDINE 20 MG/1
20 TABLET, FILM COATED ORAL 2 TIMES DAILY
Qty: 60 TABLET | Refills: 5 | Status: SHIPPED | OUTPATIENT
Start: 2022-07-29 | End: 2023-01-07 | Stop reason: SDUPTHER

## 2022-07-29 RX ORDER — MELOXICAM 15 MG/1
15 TABLET ORAL DAILY
Qty: 30 TABLET | Refills: 1 | Status: SHIPPED | OUTPATIENT
Start: 2022-07-29 | End: 2022-08-23 | Stop reason: SDUPTHER

## 2022-07-29 RX ORDER — SULFAMETHOXAZOLE AND TRIMETHOPRIM 800; 160 MG/1; MG/1
1 TABLET ORAL 2 TIMES DAILY
Qty: 20 TABLET | Refills: 0 | Status: SHIPPED | OUTPATIENT
Start: 2022-07-29 | End: 2022-08-23

## 2022-07-29 RX ORDER — LEVETIRACETAM 1000 MG/1
1000 TABLET ORAL 2 TIMES DAILY
Qty: 60 TABLET | Refills: 1 | Status: SHIPPED | OUTPATIENT
Start: 2022-07-29 | End: 2022-08-23 | Stop reason: SDUPTHER

## 2022-07-29 NOTE — PATIENT INSTRUCTIONS
- Call if urinary symptoms aren't better in 7-10 days and we will get you a referral to urology  - Continue to drink lots of water  - Drink a little apple juice daily to help the bladder  - Work on quitting smoking to get the HDL good cholesterol level higher.

## 2022-07-29 NOTE — PROGRESS NOTES
Answers for HPI/ROS submitted by the patient on 7/28/2022  What is the primary reason for your visit?: Back Pain  Onset: more than 1 year ago  Frequency: constantly  Progression since onset: gradually worsening  Pain location: lumbar spine  Pain quality: cramping  Radiates to: does not radiate  Pain - numeric: 8/10  Pain is: the same all the time  Aggravated by: bending, standing, twisting  Stiffness is present: all day  abdominal pain: No  bladder incontinence: Yes  bowel incontinence: No  chest pain: No  dysuria: No  fever: No  headaches: No  leg pain: No  numbness: No  paresis: No  paresthesias: No  pelvic pain: No  perianal numbness: No  tingling: Yes  weakness: Yes  weight loss: No  Risk factors: obesity, poor posture    Chief Complaint  Med Management (Concerned about progressive urinary symptoms)    Subjective        History of Present Illness  Mayito Bassett is a 51 y.o. male who presents to [facility]   Urinary sx:  6-7 mos of dribbling  Wet his pants without warning at work recently, embarassed, had to go home to change  + Urgency and incontinence from urgency x 2 weeks  No fevers  No rectal pain  No constipation  Feels he empties okay but sometimes just a little comes out and thinks there should be more volume  Drinking more water day and night in general to treat dry mouth; has been doing this for a long long time  Mom had kidney disease; pt is worried about his kidneys  No hesitancy  + Nocturia x 4 in the past 2 weeks  Appetite ok  No N/V  Some CVA discomfort for quite a while v. Mechanical back pain  Flexeril calms back pain down some; hasn't affected the bladder  Back pain better with rest, has to lift heavy boxes at work and is standing on his feet at work at [employer] which worsens the back pain  Has taken NSAIDs in the past couple weeks without any change in urinary sx and only gets mildl relief of back pain.  Denies prostate problems for him or family members; no  prostate cancer hx in him or family   No hematuria  No mucoid drainage from penis.  No genital complaints.   No recent sexual activity, no h/o STD      Has had sev recent specialty referrals:  Saw heart doctor this week, now on heart monitor and will have sleep study and stress test soon.  Notes reviewed.  Hasn't heard from neurology; will no longer go to  neuro since he has moved here  Has upcoming visit with Dr. Davi Norton in ENT for the cyst on his face/ear area    Result Review :   The following data was reviewed by: Rosa Lamas MD on 07/29/2022:    Hospital Outpatient Visit on 07/28/2022   Component Date Value Ref Range Status   • Target HR (85%) 07/28/2022 143  bpm In process   • Max. Pred. HR (100%) 07/28/2022 168  bpm In process   Lab on 07/27/2022   Component Date Value Ref Range Status   • Hemoglobin A1C 07/27/2022 5.20  4.80 - 5.60 % Final   • Zonisamide 07/27/2022 14.8  10.0 - 40.0 ug/mL Final                                    Detection Limit = 2.0   • WBC 07/27/2022 7.71  3.40 - 10.80 10*3/mm3 Final   • RBC 07/27/2022 4.67  4.14 - 5.80 10*6/mm3 Final   • Hemoglobin 07/27/2022 15.7  13.0 - 17.7 g/dL Final   • Hematocrit 07/27/2022 43.8  37.5 - 51.0 % Final   • MCV 07/27/2022 93.8  79.0 - 97.0 fL Final   • MCH 07/27/2022 33.6 (A) 26.6 - 33.0 pg Final   • MCHC 07/27/2022 35.8 (A) 31.5 - 35.7 g/dL Final   • RDW 07/27/2022 12.5  12.3 - 15.4 % Final   • RDW-SD 07/27/2022 42.8  37.0 - 54.0 fl Final   • MPV 07/27/2022 10.8  6.0 - 12.0 fL Final   • Platelets 07/27/2022 169  140 - 450 10*3/mm3 Final   • Glucose 07/27/2022 104 (A) 65 - 99 mg/dL Final   • BUN 07/27/2022 11  6 - 20 mg/dL Final   • Creatinine 07/27/2022 1.03  0.76 - 1.27 mg/dL Final   • Sodium 07/27/2022 142  136 - 145 mmol/L Final   • Potassium 07/27/2022 3.9  3.5 - 5.2 mmol/L Final   • Chloride 07/27/2022 107  98 - 107 mmol/L Final   • CO2 07/27/2022 25.4  22.0 - 29.0 mmol/L Final   • Calcium 07/27/2022 8.7  8.6 - 10.5 mg/dL Final   •  Total Protein 07/27/2022 6.2  6.0 - 8.5 g/dL Final   • Albumin 07/27/2022 4.20  3.50 - 5.20 g/dL Final   • ALT (SGPT) 07/27/2022 28  1 - 41 U/L Final   • AST (SGOT) 07/27/2022 21  1 - 40 U/L Final   • Alkaline Phosphatase 07/27/2022 80  39 - 117 U/L Final   • Total Bilirubin 07/27/2022 0.5  0.0 - 1.2 mg/dL Final   • Globulin 07/27/2022 2.0  gm/dL Final   • A/G Ratio 07/27/2022 2.1  g/dL Final   • BUN/Creatinine Ratio 07/27/2022 10.7  7.0 - 25.0 Final   • Anion Gap 07/27/2022 9.6  5.0 - 15.0 mmol/L Final   • eGFR 07/27/2022 87.4  >60.0 mL/min/1.73 Final    National Kidney Foundation and American Society of Nephrology (ASN) Task Force recommended calculation based on the Chronic Kidney Disease Epidemiology Collaboration (CKD-EPI) equation refit without adjustment for race.   • Iron 07/27/2022 126  59 - 158 mcg/dL Final   • Iron Saturation 07/27/2022 39  20 - 50 % Final   • Transferrin 07/27/2022 215  200 - 360 mg/dL Final   • TIBC 07/27/2022 320  298 - 536 mcg/dL Final   • 25 Hydroxy, Vitamin D 07/27/2022 21.9 (A) 30.0 - 100.0 ng/ml Final   • Vitamin B-12 07/27/2022 347  211 - 946 pg/mL Final   • TSH 07/27/2022 2.400  0.270 - 4.200 uIU/mL Final   • Color, UA 07/27/2022 Yellow  Yellow, Straw Final   • Appearance, UA 07/27/2022 Clear  Clear Final   • pH, UA 07/27/2022 7.0  5.0 - 8.0 Final   • Specific Gravity, UA 07/27/2022 1.019  1.005 - 1.030 Final   • Glucose, UA 07/27/2022 Negative  Negative Final   • Ketones, UA 07/27/2022 Negative  Negative Final   • Bilirubin, UA 07/27/2022 Negative  Negative Final   • Blood, UA 07/27/2022 Negative  Negative Final   • Protein, UA 07/27/2022 Negative  Negative Final   • Leuk Esterase, UA 07/27/2022 Negative  Negative Final   • Nitrite, UA 07/27/2022 Negative  Negative Final   • Urobilinogen, UA 07/27/2022 1.0 E.U./dL  0.2 - 1.0 E.U./dL Final   • PSA 07/27/2022 0.370  0.000 - 4.000 ng/mL Final   • Microalbumin, Urine 07/27/2022 <1.2  mg/dL Final   • Total Cholesterol 07/27/2022 141   0 - 200 mg/dL Final   • Triglycerides 07/27/2022 119  0 - 150 mg/dL Final   • HDL Cholesterol 07/27/2022 33 (A) 40 - 60 mg/dL Final   • LDL Cholesterol  07/27/2022 86  0 - 100 mg/dL Final   • VLDL Cholesterol 07/27/2022 22  5 - 40 mg/dL Final   • LDL/HDL Ratio 07/27/2022 2.55   Final       We discussed effects of smoking on low HDL and need for more Vit D which may help the back pain and muscle aches.  UA is reassuring, points against UTI.  Pt still could have prostatitis  PSA points against prostate CA concern.    Data reviewed: Radiologic studies from past work up on back:   Study Result    Narrative & Impression   EXAMINATION:  XR SPINE LUMBAR 4+ VW-  5/7/2018 2:05 PM CDT     HISTORY: Right-sided low back pain.      COMPARISON: No comparison study.     TECHNIQUE: 5 views were obtained.     FINDINGS: There is straightening of the lumbar spine. The lumbar disc  spaces are fairly well-preserved. There is minimal endplate spurring at  some levels. There is no fracture or subluxation. The pedicles are  intact.     IMPRESSION:  1. Straightening on the lateral view could be positional or due to  muscle spasm.  2. Mild endplate spurring. Disc spaces are well-maintained.             Review of Systems   Constitutional: Negative for fever and unexpected weight loss.   Cardiovascular: Negative for chest pain.        + bradycardia   Gastrointestinal: Negative for abdominal pain.   Genitourinary: Positive for decreased urine volume, difficulty urinating, frequency, nocturia, urgency and urinary incontinence. Negative for discharge, dysuria, genital sores, hematuria, penile pain, penile swelling, scrotal swelling and testicular pain.   Musculoskeletal: Positive for back pain.        Jerking of muscles is largely better with the flexeril; still having it occ.   Neurological: Positive for weakness. Negative for numbness.        Recent fall at home when he accidentally took the Flexeril with an old Rx for another muscle relaxer.   "Not dizzy now    Needs refill on the Keppra since he hasn't gotten in with neurology at [facility] yet.        Past Medical History:   Diagnosis Date   • Anxiety    • Arthritis    • Depression    • GERD (gastroesophageal reflux disease)    • Seizures (HCC)      No past surgical history on file.      Outpatient Medications Prior to Visit   Medication Sig Dispense Refill   • aspirin 81 MG EC tablet Take 81 mg by mouth Daily.     • citalopram (CeleXA) 20 MG tablet      • cyclobenzaprine (FLEXERIL) 10 MG tablet Take 1 tablet by mouth At Night As Needed for Muscle Spasms. 30 tablet 5   • Multiple Vitamin (MULTI VITAMIN MENS PO) Take  by mouth.     • traZODone (DESYREL) 100 MG tablet Take 1 tablet by mouth Every Night. For sleep and mood 30 tablet 5   • zonisamide (ZONEGRAN) 100 MG capsule Take 3 capsules by mouth every night at bedtime. For prevention of seizures 90 capsule 1   • levETIRAcetam (KEPPRA) 1000 MG tablet Take 1,000 mg by mouth 2 (Two) Times a Day.       No facility-administered medications prior to visit.        Objective   Vital Signs:   /80 (BP Location: Left arm, Patient Position: Sitting, Cuff Size: Adult)   Pulse 52   Temp 97.2 °F (36.2 °C) (Temporal)   Ht 172.7 cm (68\")   Wt 118 kg (261 lb)   SpO2 98%   BMI 39.68 kg/m²       Physical Exam  Vitals reviewed.   Constitutional:       General: He is not in acute distress.     Appearance: Normal appearance. He is obese. He is not ill-appearing.   Eyes:      Conjunctiva/sclera: Conjunctivae normal.   Cardiovascular:      Rate and Rhythm: Regular rhythm. Bradycardia present.      Heart sounds: No murmur heard.  Pulmonary:      Effort: Pulmonary effort is normal.      Breath sounds: Normal breath sounds.      Comments: Short E phase, clear I phase  Abdominal:      General: Bowel sounds are normal. There is no distension.      Palpations: Abdomen is soft. There is no mass.      Tenderness: There is no abdominal tenderness. There is left CVA tenderness. " There is no right CVA tenderness, guarding or rebound.      Hernia: No hernia is present.      Comments: Soft protuberant habitus, central obesity   Musculoskeletal:      Right lower leg: No edema.      Left lower leg: No edema.      Comments: Some non focal lower and mid back muscle soreness and discomfort with palpation   Lymphadenopathy:      Head:      Right side of head: No tonsillar adenopathy.      Left side of head: No tonsillar adenopathy.   Skin:     General: Skin is warm.      Findings: No rash.   Neurological:      General: No focal deficit present.      Mental Status: He is alert and oriented to person, place, and time.      Comments: R leg and foot have gone to sleep while he has been sitting talking to me.   Psychiatric:         Mood and Affect: Mood normal.         Behavior: Behavior normal.                 Assessment and Plan    Diagnoses and all orders for this visit:    1. Urinary incontinence, unspecified type (Primary)  -     sulfamethoxazole-trimethoprim (Bactrim DS) 800-160 MG per tablet; Take 1 tablet by mouth 2 (Two) Times a Day. As antibiotic  Dispense: 20 tablet; Refill: 0    2. Gastroesophageal reflux disease, unspecified whether esophagitis present  -     famotidine (Pepcid) 20 MG tablet; Take 1 tablet by mouth 2 (Two) Times a Day. To control acid reflux and heartburn  Dispense: 60 tablet; Refill: 5    3. Nonintractable epilepsy without status epilepticus, unspecified epilepsy type (HCC)  -     levETIRAcetam (KEPPRA) 1000 MG tablet; Take 1 tablet by mouth 2 (Two) Times a Day. To control seizures  Dispense: 60 tablet; Refill: 1    4. Vitamin D deficiency  -     Cholecalciferol (Vitamin D3) 1.25 MG (83831 UT) capsule; Take 1 capsule by mouth 1 (One) Time Per Week. For vitamin D deficiency, take with food  Dispense: 5 capsule; Refill: 5    5. Low serum HDL    6. Smoker    7. Muscle, jerky movements (uncontrolled)    8. Bradycardia, sinus    9. Nocturia  -     sulfamethoxazole-trimethoprim  (Bactrim DS) 800-160 MG per tablet; Take 1 tablet by mouth 2 (Two) Times a Day. As antibiotic  Dispense: 20 tablet; Refill: 0    10. Post-void dribbling  -     sulfamethoxazole-trimethoprim (Bactrim DS) 800-160 MG per tablet; Take 1 tablet by mouth 2 (Two) Times a Day. As antibiotic  Dispense: 20 tablet; Refill: 0    11. Dorsalgia  -     meloxicam (Mobic) 15 MG tablet; Take 1 tablet by mouth Daily. Take with food ( avoid ibuprofen and aleve while on this medication)  Dispense: 30 tablet; Refill: 1    Differential dx includes acute prostatitis, worsening chronic BPH, effects of meds, bladder spasms.  Will add Abx  Continue Flexeril  Start Mobic for back pain and to decrease urinary tract inflammation  May need trial of Flomax  Start Vit D; recheck levels on that and the lipids in 6 months  Continue to work on cutting back on cigarettes with ultimate goal to quit completely    See neuro at [facility]  F/U with cardiology as planned for stress testing next week.  See ENT late August.    I spent 42 minutes caring for Mayito on this date of service. This time includes time spent by me in the following activities:reviewing tests, obtaining and/or reviewing a separately obtained history, performing a medically appropriate examination and/or evaluation , counseling and educating the patient/family/caregiver, ordering medications, tests, or procedures, referring and communicating with other health care professionals , documenting information in the medical record, independently interpreting results and communicating that information with the patient/family/caregiver and care coordination    Follow Up   Return for Next scheduled follow up in one month. with Dr. Lamas    Patient was given instructions and counseling regarding his condition or for health maintenance advice.   Patient Instructions   - Call if urinary symptoms aren't better in 7-10 days and we will get you a referral to urology  - Continue to drink lots of water  -  Drink a little apple juice daily to help the bladder  - Work on quitting smoking to get the HDL good cholesterol level higher.             Please see specific information/handouts pulled into the AVS if appropriate.       Rosa Lamas M.D.  [facility]

## 2022-07-30 LAB — LEVETIRACETAM SERPL-MCNC: 34.9 UG/ML (ref 10–40)

## 2022-08-01 ENCOUNTER — APPOINTMENT (OUTPATIENT)
Dept: CARDIOLOGY | Facility: HOSPITAL | Age: 52
End: 2022-08-01

## 2022-08-04 ENCOUNTER — HOSPITAL ENCOUNTER (OUTPATIENT)
Dept: CARDIOLOGY | Facility: HOSPITAL | Age: 52
Discharge: HOME | End: 2022-08-04
Admitting: INTERNAL MEDICINE

## 2022-08-04 VITALS — HEIGHT: 68 IN | WEIGHT: 260.14 LBS | BODY MASS INDEX: 39.43 KG/M2

## 2022-08-04 PROCEDURE — 25010000002 PERFLUTREN 6.52 MG/ML SUSPENSION: Performed by: INTERNAL MEDICINE

## 2022-08-04 PROCEDURE — 93017 CV STRESS TEST TRACING ONLY: CPT

## 2022-08-04 PROCEDURE — 0 DOBUTAMINE PER 250 MG: Performed by: INTERNAL MEDICINE

## 2022-08-04 PROCEDURE — 25010000002 ATROPINE SULFATE: Performed by: INTERNAL MEDICINE

## 2022-08-04 PROCEDURE — 93350 STRESS TTE ONLY: CPT | Performed by: INTERNAL MEDICINE

## 2022-08-04 PROCEDURE — 93350 STRESS TTE ONLY: CPT

## 2022-08-04 PROCEDURE — 93018 CV STRESS TEST I&R ONLY: CPT | Performed by: INTERNAL MEDICINE

## 2022-08-04 PROCEDURE — 93352 ADMIN ECG CONTRAST AGENT: CPT | Performed by: INTERNAL MEDICINE

## 2022-08-04 RX ORDER — DOBUTAMINE HYDROCHLORIDE 100 MG/100ML
10 INJECTION INTRAVENOUS CONTINUOUS
Status: DISCONTINUED | OUTPATIENT
Start: 2022-08-04 | End: 2022-08-05 | Stop reason: HOSPADM

## 2022-08-04 RX ADMIN — ATROPINE SULFATE 1.6 MG: 0.1 INJECTION INTRAVENOUS at 14:14

## 2022-08-04 RX ADMIN — DOBUTAMINE HYDROCHLORIDE 10 MCG/KG/MIN: 100 INJECTION INTRAVENOUS at 14:00

## 2022-08-04 RX ADMIN — PERFLUTREN 8.48 MG: 6.52 INJECTION, SUSPENSION INTRAVENOUS at 13:42

## 2022-08-06 LAB
BH CV STRESS BP STAGE 1: NORMAL
BH CV STRESS BP STAGE 2: NORMAL
BH CV STRESS BP STAGE 3: NORMAL
BH CV STRESS DOB - ATROPINE STAGE 3: 1.6
BH CV STRESS DOSE DOBUTAMINE STAGE 1: 10
BH CV STRESS DOSE DOBUTAMINE STAGE 2: 20
BH CV STRESS DOSE DOBUTAMINE STAGE 3: 30
BH CV STRESS DURATION MIN STAGE 1: 3
BH CV STRESS DURATION MIN STAGE 2: 3
BH CV STRESS DURATION MIN STAGE 3: 3
BH CV STRESS DURATION SEC STAGE 1: 0
BH CV STRESS DURATION SEC STAGE 2: 0
BH CV STRESS DURATION SEC STAGE 3: 15
BH CV STRESS HR STAGE 1: 42
BH CV STRESS HR STAGE 2: 57
BH CV STRESS HR STAGE 3: 146
BH CV STRESS PROTOCOL 1: NORMAL
BH CV STRESS STAGE 1: 1
BH CV STRESS STAGE 2: 2
BH CV STRESS STAGE 3: 3
MAXIMAL PREDICTED HEART RATE: 168 BPM
PERCENT MAX PREDICTED HR: 86.9 %
STRESS BASELINE BP: NORMAL MMHG
STRESS BASELINE HR: 50 BPM
STRESS PERCENT HR: 102 %
STRESS POST EXERCISE DUR MIN: 9 MIN
STRESS POST EXERCISE DUR SEC: 15 SEC
STRESS POST PEAK BP: NORMAL MMHG
STRESS POST PEAK HR: 146 BPM
STRESS TARGET HR: 143 BPM

## 2022-08-09 LAB
MAXIMAL PREDICTED HEART RATE: 168 BPM
STRESS TARGET HR: 143 BPM

## 2022-08-09 PROCEDURE — 93227 XTRNL ECG REC<48 HR R&I: CPT | Performed by: INTERNAL MEDICINE

## 2022-08-23 ENCOUNTER — OFFICE VISIT (OUTPATIENT)
Dept: CARDIOLOGY | Facility: CLINIC | Age: 52
End: 2022-08-23

## 2022-08-23 ENCOUNTER — OFFICE VISIT (OUTPATIENT)
Dept: FAMILY MEDICINE CLINIC | Facility: CLINIC | Age: 52
End: 2022-08-23

## 2022-08-23 VITALS
HEART RATE: 70 BPM | HEIGHT: 68 IN | DIASTOLIC BLOOD PRESSURE: 78 MMHG | OXYGEN SATURATION: 99 % | BODY MASS INDEX: 38.65 KG/M2 | RESPIRATION RATE: 18 BRPM | WEIGHT: 255 LBS | SYSTOLIC BLOOD PRESSURE: 128 MMHG

## 2022-08-23 VITALS
TEMPERATURE: 97 F | OXYGEN SATURATION: 99 % | SYSTOLIC BLOOD PRESSURE: 120 MMHG | DIASTOLIC BLOOD PRESSURE: 80 MMHG | WEIGHT: 255.2 LBS | BODY MASS INDEX: 38.68 KG/M2 | HEART RATE: 57 BPM | HEIGHT: 68 IN

## 2022-08-23 DIAGNOSIS — Z72.0 TOBACCO ABUSE: ICD-10-CM

## 2022-08-23 DIAGNOSIS — G40.909 SEIZURE DISORDER: ICD-10-CM

## 2022-08-23 DIAGNOSIS — G25.5 MUSCLE, JERKY MOVEMENTS (UNCONTROLLED): ICD-10-CM

## 2022-08-23 DIAGNOSIS — M54.9 DORSALGIA: ICD-10-CM

## 2022-08-23 DIAGNOSIS — G40.909 NONINTRACTABLE EPILEPSY WITHOUT STATUS EPILEPTICUS, UNSPECIFIED EPILEPSY TYPE: ICD-10-CM

## 2022-08-23 DIAGNOSIS — F17.200 SMOKER: ICD-10-CM

## 2022-08-23 DIAGNOSIS — I50.1 HEART FAILURE, LEFT, WITH LVEF 41-49%: Primary | ICD-10-CM

## 2022-08-23 DIAGNOSIS — I50.22 CHRONIC SYSTOLIC (CONGESTIVE) HEART FAILURE: Primary | ICD-10-CM

## 2022-08-23 DIAGNOSIS — H60.62 CHRONIC NON-INFECTIVE OTITIS EXTERNA OF LEFT EAR, UNSPECIFIED TYPE: ICD-10-CM

## 2022-08-23 DIAGNOSIS — E66.2 CLASS 2 OBESITY WITH ALVEOLAR HYPOVENTILATION AND BODY MASS INDEX (BMI) OF 38.0 TO 38.9 IN ADULT, UNSPECIFIED WHETHER SERIOUS COMORBIDITY PRESENT: ICD-10-CM

## 2022-08-23 DIAGNOSIS — R00.1 BRADYCARDIA, SINUS: ICD-10-CM

## 2022-08-23 DIAGNOSIS — R00.1 BRADYCARDIA: ICD-10-CM

## 2022-08-23 DIAGNOSIS — E78.6 LOW HDL (UNDER 40): ICD-10-CM

## 2022-08-23 DIAGNOSIS — E66.9 OBESITY (BMI 30-39.9): ICD-10-CM

## 2022-08-23 PROCEDURE — 99214 OFFICE O/P EST MOD 30 MIN: CPT | Performed by: NURSE PRACTITIONER

## 2022-08-23 PROCEDURE — 99214 OFFICE O/P EST MOD 30 MIN: CPT | Performed by: FAMILY MEDICINE

## 2022-08-23 NOTE — PROGRESS NOTES
Subjective:     Encounter Date:08/23/2022      Patient ID: Mayito Bassett is a 51 y.o. male     Chief Complaint:  History of Present Illness  Patient presents today for follow up for bradycardia and shortness of breath. Patient was referred to our office by his PCP for bradycardia. He wore a holter monitor with an overall benign monitor. Overnight pulse ox ordered but results pending. He had a stress test which was low risk for ischemic but LVEF was noted to be 41-45%. He does note some shortness of breath when he bends over. Denies chest pain. He notes a rare palpitations. Follows with Dr. Lamas as her PCP. Patient has obesity, GERD, seizures, tobacco abuse and muscle spasms.     The following portions of the patient's history were reviewed and updated as appropriate: allergies, current medications, past medical history, past social history, past and problem list.    No Known Allergies    Current Outpatient Medications:   •  aspirin 81 MG EC tablet, Take 81 mg by mouth Daily., Disp: , Rfl:   •  Cholecalciferol (Vitamin D3) 1.25 MG (05776 UT) capsule, Take 1 capsule by mouth 1 (One) Time Per Week. For vitamin D deficiency, take with food, Disp: 5 capsule, Rfl: 5  •  cyclobenzaprine (FLEXERIL) 10 MG tablet, Take 1 tablet by mouth At Night As Needed for Muscle Spasms., Disp: 30 tablet, Rfl: 5  •  famotidine (Pepcid) 20 MG tablet, Take 1 tablet by mouth 2 (Two) Times a Day. To control acid reflux and heartburn, Disp: 60 tablet, Rfl: 5  •  Isopropyl Alcohol 95 % liquid, Administer 3 drops into ear(s) as directed by provider 2 (Two) Times a Day. To dry up ear wetness, Disp: 29.57 mL, Rfl: 2  •  levETIRAcetam (KEPPRA) 1000 MG tablet, Take 1 tablet by mouth 2 (Two) Times a Day. To control seizures, Disp: 60 tablet, Rfl: 1  •  meloxicam (Mobic) 15 MG tablet, Take 1 tablet by mouth Daily. Take with food ( avoid ibuprofen and aleve while on this medication), Disp: 30 tablet, Rfl: 1  •  Multiple Vitamin (MULTI VITAMIN MENS  PO), Take  by mouth., Disp: , Rfl:   •  traZODone (DESYREL) 100 MG tablet, Take 1 tablet by mouth Every Night. For sleep and mood, Disp: 30 tablet, Rfl: 5  •  zonisamide (ZONEGRAN) 100 MG capsule, Take 3 capsules by mouth every night at bedtime. For prevention of seizures, Disp: 90 capsule, Rfl: 1    Social History     Socioeconomic History   • Marital status: Single   Tobacco Use   • Smoking status: Heavy Tobacco Smoker     Packs/day: 3.00     Types: Cigarettes   • Smokeless tobacco: Never Used   Vaping Use   • Vaping Use: Never used   Substance and Sexual Activity   • Alcohol use: Yes     Comment: OCCASIONAL   • Drug use: Defer   • Sexual activity: Not Currently     Partners: Female       Review of Systems   Constitutional: Positive for malaise/fatigue. Negative for chills, decreased appetite, fever, weight gain and weight loss.   HENT: Negative for nosebleeds.    Eyes: Negative for visual disturbance.   Cardiovascular: Negative for chest pain, dyspnea on exertion, leg swelling, near-syncope, orthopnea, palpitations, paroxysmal nocturnal dyspnea and syncope.   Respiratory: Positive for shortness of breath. Negative for cough, hemoptysis and snoring.    Endocrine: Negative for cold intolerance and heat intolerance.   Hematologic/Lymphatic: Negative for bleeding problem. Does not bruise/bleed easily.   Skin: Negative for rash.   Musculoskeletal: Negative for back pain and falls.   Gastrointestinal: Negative for abdominal pain, constipation, diarrhea, heartburn, melena, nausea and vomiting.   Genitourinary: Negative for hematuria.   Neurological: Negative for dizziness, headaches and light-headedness.   Psychiatric/Behavioral: Negative for altered mental status.   Allergic/Immunologic: Negative for persistent infections.              Objective:     Constitutional:       Appearance: Well-developed. Obese.   Eyes:      Pupils: Pupils are equal, round, and reactive to light.   HENT:      Head: Normocephalic and  "atraumatic.   Neck:      Vascular: No carotid bruit or JVD.   Pulmonary:      Effort: Pulmonary effort is normal.      Breath sounds: Normal breath sounds.   Cardiovascular:      Normal rate. Regular rhythm.   Pulses:     Intact distal pulses.   Edema:     Peripheral edema absent.   Abdominal:      General: Bowel sounds are normal.      Palpations: Abdomen is soft.   Musculoskeletal: Normal range of motion.      Cervical back: Normal range of motion and neck supple. Skin:     General: Skin is warm and dry.   Neurological:      Mental Status: Alert and oriented to person, place, and time.      Deep Tendon Reflexes: Reflexes are normal and symmetric.   Psychiatric:         Behavior: Behavior normal.         Thought Content: Thought content normal.         Judgment: Judgment normal.           Procedures  /78 (BP Location: Right arm, Patient Position: Sitting, Cuff Size: Adult)   Pulse 70   Resp 18   Ht 172.7 cm (68\")   Wt 116 kg (255 lb)   SpO2 99%   BMI 38.77 kg/m²   Lab Review:   I have reviewed       Lab Results   Component Value Date    CHOL 141 07/27/2022    TRIG 119 07/27/2022    HDL 33 (L) 07/27/2022    LDL 86 07/27/2022      Results for orders placed in visit on 07/27/22    Adult Stress Echo W/ Cont or Stress Agent if Necessary Per Protocol    Interpretation Summary  · Left ventricular ejection fraction appears to be 41 - 45%. Left ventricular systolic function is moderately decreased.     Assessment:          Diagnosis Plan   1. Heart failure, left, with LVEF 41-49% (HCC)  Adult Transthoracic Echo Complete w/ Color, Spectral and Contrast if necessary per protocol   2. Bradycardia, sinus     3. 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, unspecified whether serious comorbidity present (HCC)     4. Tobacco abuse            Plan:       1. LVEF 41-45% per stress echo. Will check echo for better picture of LVEF. Notes some shortness of breath. " Educated on importance of low salt diet- notes he eats a lot of salt. Encouraged lifestyle modification.  2. Sinus ximena- overall monitor stable. Overnight oxymetry results pending.  3. Class 2 Severe Obesity (BMI >=35 and <=39.9). Obesity-related health conditions include the following: reduced LVEF. Obesity is unchanged. BMI is is above average; BMI management plan is completed. We discussed portion control and increasing exercise.  4. Mayito PERSAUD Bassett  reports that he has been smoking cigarettes. He has been smoking about 3.00 packs per day. He has never used smokeless tobacco.. I have educated him on the risk of diseases from using tobacco products such as cancer, COPD and heart disease.     I advised him to quit and he is not willing to quit.    I spent 5 minutes counseling the patient.       Lengthy discussion had about lifestyle modification        I spent 32 minutes caring for Mayito on this date of service. This time includes time spent by me in the following activities:preparing for the visit, reviewing tests, obtaining and/or reviewing a separately obtained history, performing a medically appropriate examination and/or evaluation , counseling and educating the patient/family/caregiver, ordering medications, tests, or procedures, referring and communicating with other health care professionals , documenting information in the medical record, independently interpreting results and communicating that information with the patient/family/caregiver and care coordination

## 2022-08-29 ENCOUNTER — TELEPHONE (OUTPATIENT)
Dept: OTOLARYNGOLOGY | Facility: CLINIC | Age: 52
End: 2022-08-29

## 2022-08-29 NOTE — TELEPHONE ENCOUNTER
Received message regarding rescheduling appt. Called and left vm for patient to call back and reschedule.

## 2022-09-12 DIAGNOSIS — G40.909 NONINTRACTABLE EPILEPSY WITHOUT STATUS EPILEPTICUS, UNSPECIFIED EPILEPSY TYPE: ICD-10-CM

## 2022-09-12 NOTE — TELEPHONE ENCOUNTER
Rx Refill Note  Requested Prescriptions     Pending Prescriptions Disp Refills   • zonisamide (ZONEGRAN) 100 MG capsule [Pharmacy Med Name: Zonisamide 100 MG Oral Capsule] 90 capsule 0     Sig: TAKE 3 CAPSULES BY MOUTH AT BEDTIME FOR  PREVENTION  OF  SEIZURES      Last office visit with prescribing clinician: 8/23/2022      Next office visit with prescribing clinician: 10/12/2022            ASHLYN Lazo  09/12/22, 14:38 CDT

## 2022-09-13 ENCOUNTER — TELEPHONE (OUTPATIENT)
Dept: NEUROLOGY | Facility: CLINIC | Age: 52
End: 2022-09-13

## 2022-09-13 PROBLEM — G40.909 SEIZURE DISORDER: Status: ACTIVE | Noted: 2022-09-13

## 2022-09-13 PROBLEM — I50.22 CHRONIC SYSTOLIC (CONGESTIVE) HEART FAILURE: Status: ACTIVE | Noted: 2022-09-13

## 2022-09-13 PROBLEM — M54.9 DORSALGIA: Status: ACTIVE | Noted: 2022-09-13

## 2022-09-13 PROBLEM — E66.9 OBESITY (BMI 30-39.9): Status: ACTIVE | Noted: 2022-09-13

## 2022-09-13 PROBLEM — E78.6 LOW HDL (UNDER 40): Status: ACTIVE | Noted: 2022-09-13

## 2022-09-13 RX ORDER — MELOXICAM 15 MG/1
15 TABLET ORAL DAILY
Qty: 30 TABLET | Refills: 1 | Status: SHIPPED | OUTPATIENT
Start: 2022-09-13 | End: 2022-10-14

## 2022-09-13 RX ORDER — ZONISAMIDE 100 MG/1
CAPSULE ORAL
Qty: 90 CAPSULE | Refills: 0 | OUTPATIENT
Start: 2022-09-13

## 2022-09-13 RX ORDER — ZONISAMIDE 100 MG/1
300 CAPSULE ORAL
Qty: 90 CAPSULE | Refills: 1 | Status: SHIPPED | OUTPATIENT
Start: 2022-09-13 | End: 2022-11-11 | Stop reason: SDUPTHER

## 2022-09-13 RX ORDER — LEVETIRACETAM 1000 MG/1
1000 TABLET ORAL 2 TIMES DAILY
Qty: 60 TABLET | Refills: 1 | Status: SHIPPED | OUTPATIENT
Start: 2022-09-13 | End: 2022-11-11 | Stop reason: SDUPTHER

## 2022-09-13 NOTE — TELEPHONE ENCOUNTER
Caller: Mayito Bassett    Relationship: Self    Best call back number:  [phone]    What is the best time to reach you: anytime    Who are you requesting to speak with (clinical staff, provider,  specific staff member): Tari    Do you know the name of the person who called: Tari    What was the call regarding: returning phone call about appt , pt not able to come in Wed or Thurs but if there is an opening on 9-20-22 around 12noon. Pt does sometimes have transportation issue.    Do you require a callback: yes

## 2022-09-13 NOTE — TELEPHONE ENCOUNTER
I called and left message for patient that we have an appointment available with NITHIN Wild, tomorrow at 10:30 or Thursday at 2. Would like him to call back to confirm he he can come either of those days. I left message with Manager of referring provider as well.

## 2022-09-14 ENCOUNTER — TELEPHONE (OUTPATIENT)
Dept: NEUROLOGY | Facility: CLINIC | Age: 52
End: 2022-09-14

## 2022-09-14 NOTE — TELEPHONE ENCOUNTER
HUB TO READ:    I called and left a message with patient that we have an opening with Juanjose on 9/20/22 at 10:30. I put on schedule and await confirmation that he can make this appointment. Ok to r/s at patients convince if he can't make this appointment.

## 2022-10-14 ENCOUNTER — OFFICE VISIT (OUTPATIENT)
Dept: FAMILY MEDICINE CLINIC | Facility: CLINIC | Age: 52
End: 2022-10-14

## 2022-10-14 ENCOUNTER — LAB (OUTPATIENT)
Dept: LAB | Facility: HOSPITAL | Age: 52
End: 2022-10-14

## 2022-10-14 VITALS
WEIGHT: 248.8 LBS | OXYGEN SATURATION: 100 % | HEART RATE: 58 BPM | SYSTOLIC BLOOD PRESSURE: 132 MMHG | BODY MASS INDEX: 37.71 KG/M2 | TEMPERATURE: 98.4 F | HEIGHT: 68 IN | DIASTOLIC BLOOD PRESSURE: 86 MMHG

## 2022-10-14 DIAGNOSIS — Z72.0 TOBACCO ABUSE: ICD-10-CM

## 2022-10-14 DIAGNOSIS — E55.9 VITAMIN D DEFICIENCY: ICD-10-CM

## 2022-10-14 DIAGNOSIS — G47.00 INSOMNIA, UNSPECIFIED TYPE: ICD-10-CM

## 2022-10-14 DIAGNOSIS — K21.9 GASTROESOPHAGEAL REFLUX DISEASE, UNSPECIFIED WHETHER ESOPHAGITIS PRESENT: ICD-10-CM

## 2022-10-14 DIAGNOSIS — I50.22 CHRONIC SYSTOLIC (CONGESTIVE) HEART FAILURE: ICD-10-CM

## 2022-10-14 DIAGNOSIS — Z00.00 ANNUAL PHYSICAL EXAM: ICD-10-CM

## 2022-10-14 DIAGNOSIS — R00.1 BRADYCARDIA, SINUS: ICD-10-CM

## 2022-10-14 DIAGNOSIS — G40.909 SEIZURE DISORDER: ICD-10-CM

## 2022-10-14 DIAGNOSIS — B35.1 ONYCHOMYCOSIS: ICD-10-CM

## 2022-10-14 DIAGNOSIS — Z12.2 ENCOUNTER FOR SCREENING FOR LUNG CANCER: ICD-10-CM

## 2022-10-14 DIAGNOSIS — M62.838 MUSCLE SPASMS OF LOWER EXTREMITY, UNSPECIFIED LATERALITY: ICD-10-CM

## 2022-10-14 DIAGNOSIS — Z00.00 ANNUAL PHYSICAL EXAM: Primary | ICD-10-CM

## 2022-10-14 PROCEDURE — 86803 HEPATITIS C AB TEST: CPT

## 2022-10-14 PROCEDURE — 2014F MENTAL STATUS ASSESS: CPT | Performed by: STUDENT IN AN ORGANIZED HEALTH CARE EDUCATION/TRAINING PROGRAM

## 2022-10-14 PROCEDURE — 0124A PR ADM SARSCOV2 30MCG/0.3ML BST: CPT | Performed by: STUDENT IN AN ORGANIZED HEALTH CARE EDUCATION/TRAINING PROGRAM

## 2022-10-14 PROCEDURE — 90686 IIV4 VACC NO PRSV 0.5 ML IM: CPT | Performed by: STUDENT IN AN ORGANIZED HEALTH CARE EDUCATION/TRAINING PROGRAM

## 2022-10-14 PROCEDURE — 99204 OFFICE O/P NEW MOD 45 MIN: CPT | Performed by: STUDENT IN AN ORGANIZED HEALTH CARE EDUCATION/TRAINING PROGRAM

## 2022-10-14 PROCEDURE — 99386 PREV VISIT NEW AGE 40-64: CPT | Performed by: STUDENT IN AN ORGANIZED HEALTH CARE EDUCATION/TRAINING PROGRAM

## 2022-10-14 PROCEDURE — 82306 VITAMIN D 25 HYDROXY: CPT

## 2022-10-14 PROCEDURE — 36415 COLL VENOUS BLD VENIPUNCTURE: CPT

## 2022-10-14 PROCEDURE — 3008F BODY MASS INDEX DOCD: CPT | Performed by: STUDENT IN AN ORGANIZED HEALTH CARE EDUCATION/TRAINING PROGRAM

## 2022-10-14 PROCEDURE — G0432 EIA HIV-1/HIV-2 SCREEN: HCPCS

## 2022-10-14 PROCEDURE — 90471 IMMUNIZATION ADMIN: CPT | Performed by: STUDENT IN AN ORGANIZED HEALTH CARE EDUCATION/TRAINING PROGRAM

## 2022-10-14 PROCEDURE — 91312 COVID-19 (PFIZER) BIVALENT BOOSTER 12+YRS: CPT | Performed by: STUDENT IN AN ORGANIZED HEALTH CARE EDUCATION/TRAINING PROGRAM

## 2022-10-14 PROCEDURE — 80053 COMPREHEN METABOLIC PANEL: CPT

## 2022-10-14 RX ORDER — TERBINAFINE HYDROCHLORIDE 250 MG/1
250 TABLET ORAL DAILY
Qty: 42 TABLET | Refills: 0 | Status: SHIPPED | OUTPATIENT
Start: 2022-10-14 | End: 2022-11-25

## 2022-10-14 NOTE — PATIENT INSTRUCTIONS
[facility] for the [facility]      Health [facility], Male  A healthy lifestyle and preventive care is important for your health and wellness. Ask your health care provider about what schedule of regular examinations is right for you.  What should I know about weight and diet?    Eat a Healthy Diet  Eat plenty of vegetables, fruits, whole grains, low-fat dairy products, and lean protein.  Do not eat a lot of foods high in solid fats, added sugars, or salt.     Maintain a Healthy Weight  Regular exercise can help you achieve or maintain a healthy weight. You should:  Do at least 150 minutes of exercise each week. The exercise should increase your heart rate and make you sweat (moderate-intensity exercise).  Do strength-training exercises at least twice a week.     Watch Your Levels of Cholesterol and Blood Lipids  Have your blood tested for lipids and cholesterol every 5 years starting at 35 years of age. If you are at high risk for heart disease, you should start having your blood tested when you are 20 years old. You may need to have your cholesterol levels checked more often if:  Your lipid or cholesterol levels are high.  You are older than 50 years of age.  You are at high risk for heart disease.     What should I know about cancer screening?  Many types of cancers can be detected early and may often be prevented.  Lung Cancer  You should be screened every year for lung cancer if:  You are a current smoker who has smoked for at least 30 years.  You are a former smoker who has quit within the past 15 years.  Talk to your health care provider about your screening options, when you should start screening, and how often you should be screened.     Colorectal Cancer  Routine colorectal cancer screening usually begins at 50 years of age and should be repeated every 5-10 years until you are 75 years old. You may need to be screened more often if early forms of precancerous polyps or small growths are found. Your health care  provider may recommend screening at an earlier age if you have risk factors for colon cancer.  Your health care provider may recommend using home test kits to check for hidden blood in the stool.  A small camera at the end of a tube can be used to examine your colon (sigmoidoscopy or colonoscopy). This checks for the earliest forms of colorectal cancer.     Prostate and Testicular Cancer  Depending on your age and overall health, your health care provider may do certain tests to screen for prostate and testicular cancer.  Talk to your health care provider about any symptoms or concerns you have about testicular or prostate cancer.     Skin Cancer  Check your skin from head to toe regularly.  Tell your health care provider about any new moles or changes in moles, especially if:  There is a change in a mole’s size, shape, or color.  You have a mole that is larger than a pencil eraser.  Always use sunscreen. Apply sunscreen liberally and repeat throughout the day.  Protect yourself by wearing long sleeves, pants, a wide-brimmed hat, and sunglasses when outside.     What should I know about heart disease, diabetes, and high blood pressure?  If you are 18-39 years of age, have your blood pressure checked every 3-5 years. If you are 40 years of age or older, have your blood pressure checked every year. You should have your blood pressure measured twice--once when you are at a hospital or clinic, and once when you are not at a hospital or clinic. Record the average of the two measurements. To check your blood pressure when you are not at a hospital or clinic, you can use:  An automated blood pressure machine at a pharmacy.  A home blood pressure monitor.  Talk to your health care provider about your target blood pressure.  If you are between 45-79 years old, ask your health care provider if you should take aspirin to prevent heart disease.  Have regular diabetes screenings by checking your fasting blood sugar level.  If  you are at a normal weight and have a low risk for diabetes, have this test once every three years after the age of 45.  If you are overweight and have a high risk for diabetes, consider being tested at a younger age or more often.  A one-time screening for abdominal aortic aneurysm (AAA) by ultrasound is recommended for men aged 65-75 years who are current or former smokers.  What should I know about preventing infection?  Hepatitis B  If you have a higher risk for hepatitis B, you should be screened for this virus. Talk with your health care provider to find out if you are at risk for hepatitis B infection.  Hepatitis C  Blood testing is recommended for:  Everyone born from 1945 through 1965.  Anyone with known risk factors for hepatitis C.     Sexually Transmitted Diseases (STDs)  You should be screened each year for STDs including gonorrhea and chlamydia if:  You are sexually active and are younger than 24 years of age.  You are older than 24 years of age and your health care provider tells you that you are at risk for this type of infection.  Your sexual activity has changed since you were last screened and you are at an increased risk for chlamydia or gonorrhea. Ask your health care provider if you are at risk.  Talk with your health care provider about whether you are at high risk of being infected with HIV. Your health care provider may recommend a prescription medicine to help prevent HIV infection.     What else can I do?    Schedule regular health, dental, and eye exams.  Stay current with your vaccines (immunizations).  Do not use any tobacco products, such as cigarettes, chewing tobacco, and e-cigarettes. If you need help quitting, ask your health care provider.  Limit alcohol intake to no more than 2 drinks per day. One drink equals 12 ounces of beer, 5 ounces of wine, or 1½ ounces of hard liquor.  Do not use street drugs.  Do not share needles.  Ask your health care provider for help if you need  support or information about quitting drugs.  Tell your health care provider if you often feel depressed.  Tell your health care provider if you have ever been abused or do not feel safe at home.      This information is not intended to replace advice given to you by your health care provider. Make sure you discuss any questions you have with your health care provider.  Document Released: 06/15/2009 Document Revised: 08/16/2017 Document Reviewed: 09/20/2016  [employer] Interactive Patient Education © 2018 [employer]

## 2022-10-14 NOTE — PROGRESS NOTES
Office Note     Name: Mayito Bassett    : 1970     MRN: [id number]     Chief Complaint  Slow Heart Rate ([facility], moved from [place].)    Subjective     History of Present Illness:  Mayito Bassett is a 52 y.o. male who presents today to establish care, get an annual, and follow-up on his medical conditions.  He saw Dr. Lamas in [place] but moved to [place] and now is establishing care with us.      Epilepsy:  Had first seizure in . [place] and next thing he knew, he was crawling out of his room and was told he had a seizure. Last seizure was 1 year ago when they kept him overnight at the hospital, and took him off his medications. He is currently on Keppra 1000mg twice daily and zonisamide 100mg daily. Not following with neurologist. Would like referral today.     Bradycardia/Heart failure: Was seeing a cardiologist, did some test. Does have some SOA and swelling in his legs. Was supposed to get an ECHO, but had to cancel that appt. He also had a stress test which showed no evidence of myocardial ischemia. He was supposed to follow with a cardiologist in [place], but since he moved, he would like a referral to one in [place].     Muscle spasms: Takes flexeril as needed, gets muscle spasms in legs and back, has been taking  It as needed. Will stop meloxicam because not sure why he is takingit.      GERD: On pepcid 20mg twice daily, symptoms controlled.     Insomnia: On trazodone 100mg mariah. Sleeping well on this dose.     Vitamin D deficiency: On vitamin D supplementation once weekly.     Tobacco dependence: Has been smoking since age 15, down to 1PPD since seeing Dr. Lamas, was smoking 2PPD. Has tried patches and was smoking even on patches, trying to quit on his own. Does not want any further assistance at this time. Would like lung cancer screen.     Left thumb: Has a fungal infection in fingernail. Has been there for years, has tried multiple  "topical creams which have not helped. Has not been on an oral pill.     SH:   - works at [employer] in [place]   - denies recreational drug use or drinking  - lives with sister    HM:   - Had colonscopy about 1 year ago a  - Saw Dr. Cardarelli.   - influenza and COVID vaccine today            Objective     Past Medical History:   Diagnosis Date   • Anxiety    • Arthritis    • Chronic back pain    • Congestive heart failure (CHF) (HCC)    • Depression    • GERD (gastroesophageal reflux disease)    • Obesity (BMI 30-39.9)    • Seizures (HCC)      No past surgical history on file.  Family History   Problem Relation Age of Onset   • Heart disease Mother    • Kidney disease Mother    • Diabetes Mother    • No Known Problems Sister    • No Known Problems Brother    • No Known Problems Maternal Grandmother    • No Known Problems Maternal Grandfather    • No Known Problems Paternal Grandmother    • No Known Problems Paternal Grandfather    • No Known Problems Daughter    • No Known Problems Son    • No Known Problems Cousin    • Rheum arthritis Neg Hx    • Osteoarthritis Neg Hx    • Asthma Neg Hx    • Heart failure Neg Hx    • Hyperlipidemia Neg Hx    • Hypertension Neg Hx    • Migraines Neg Hx    • Rashes / Skin problems Neg Hx    • Seizures Neg Hx    • Stroke Neg Hx    • Thyroid disease Neg Hx        Vital Signs  Pulse 58   Temp 98.4 °F (36.9 °C)   Ht 172.7 cm (68\")   Wt 113 kg (248 lb 12.8 oz)   SpO2 100%   BMI 37.83 kg/m²   Estimated body mass index is 37.83 kg/m² as calculated from the following:    Height as of this encounter: 172.7 cm (68\").    Weight as of this encounter: 113 kg (248 lb 12.8 oz).    Physical Exam  Vitals reviewed.   Constitutional:       Appearance: Normal appearance.   HENT:      Head: Normocephalic.      Right Ear: External ear normal. There is impacted cerumen.      Left Ear: External ear normal.      Nose: Nose normal.      Mouth/Throat:      Mouth: Mucous membranes are moist.   Eyes:      " Extraocular Movements: Extraocular movements intact.   Cardiovascular:      Rate and Rhythm: Normal rate and regular rhythm.      Heart sounds: Normal heart sounds.   Pulmonary:      Effort: Pulmonary effort is normal. No respiratory distress.      Breath sounds: Normal breath sounds.   Abdominal:      General: Abdomen is flat.      Palpations: Abdomen is soft.   Musculoskeletal:      Cervical back: No rigidity.      Comments: Moving all extremities spontaneously   Skin:     General: Skin is warm and dry.   Neurological:      General: No focal deficit present.      Mental Status: He is alert and oriented to person, place, and time.   Psychiatric:         Mood and Affect: Mood normal.         Behavior: Behavior normal.                 Assessment and Plan     Diagnoses and all orders for this visit:    1. Annual physical exam (Primary)  Assessment & Plan:  - discussed diet and exercise: exercising at least 30 minutes/day, 5x/week and eating more fresh fruits and vegetables and limiting intake of fatty foods - particularly fast food  - recommended annual eye and dental visits  - had colonoscopy about 1 year ago a  - saw Dr. Cardarelli, reportedly normal  - influenza and COVID vaccines administered today  - declines shingles vaccine today    Orders:  -     Hepatitis C Antibody; Future  -     HIV-1 / O / 2 Ag / Antibody 4th Generation; Future  -     FluLaval/Fluzone >6 mos ([id number])  -     COVID-19 Bivalent Booster (Pfizer) 12+yrs    2. Vitamin D deficiency  Assessment & Plan:  - history of vitamin D deficiency  - will obtain Vitamin D level today  - continue vitamin D supplementation for now    Orders:  -     Vitamin D 25 Hydroxy; Future    3. Onychomycosis  Assessment & Plan:  - left thumb, has tried 2 topical creams with no success  - will start terbinafine for 6 weeks  - follow up in 4 to assess efficacy and potentially get labs ([facility]) to monitor liver function    Orders:  -     Comprehensive metabolic panel;  Future  -     terbinafine (lamiSIL) 250 MG tablet; Take 1 tablet by mouth Daily for 42 days.  Dispense: 42 tablet; Refill: 0    4. Bradycardia, sinus  Assessment & Plan:  - pt with history of sinus bradycardia, was supposed to follow with cardiologist and get ECHO, but moved  - referral placed per pt request    Orders:  -     Ambulatory Referral to Cardiology    5. Chronic systolic (congestive) heart failure (HCC)  Assessment & Plan:  -  pt had stress test done which showed an EF of 41 - 45% and moderately decreased left ventricular systolic function]  - pt reports that her does get some swelling in his feet intermittently, denies SOA  - likely would not be able to tolerate BB due to low HR  - last lipid panel was ASCVD risk of 9.3%, will discuss starting statin at follow up  - will refer to cardiology per pt request    Orders:  -     Ambulatory Referral to Cardiology    6. Seizure disorder (HCC)  Assessment & Plan:  - controlled, last seizure was 1 year annamaria  - continue Keppra 1000mg twice daily and zonisamide 100mg daily  - will place referral to neurology     Orders:  -     Ambulatory Referral to Neurology    7. Gastroesophageal reflux disease, unspecified whether esophagitis present  Assessment & Plan:  - controlled  - continue pepcid daily      8. Muscle spasms of lower extremity, unspecified laterality  Assessment & Plan:  - intermittent  - can continue flexeril prn      9. Insomnia, unspecified type  Assessment & Plan:  - controlled  - continue trazodone      10. Tobacco abuse  Assessment & Plan:  - has approximately 74 pack year history  - has tried patches but did not work, declines further smoking cessation pharmacologic agents  - okay with CT lung cancer screen    Orders:  -      CT Chest Low Dose Cancer Screening WO; Future    11. Encounter for screening for lung cancer  -      CT Chest Low Dose Cancer Screening WO; Future       Class 2 Severe Obesity (BMI >=35 and <=39.9). Obesity-related health  conditions include the following: dyslipidemias. Obesity is newly identified. BMI is is above average; BMI management plan is completed. We discussed portion control and increasing exercise.    Smoking Cessation:   3-10 mintues spent counseling Agreeable to stop    Follow Up  Return in about 4 weeks (around 11/11/2022) for follow up for onycomycosis. and discuss statin initiation, needs 2nd COVID vaccine    Irene Chi MD

## 2022-10-15 PROBLEM — Z00.00 ANNUAL PHYSICAL EXAM: Status: ACTIVE | Noted: 2022-10-15

## 2022-10-15 PROBLEM — M62.838 MUSCLE SPASMS OF LOWER EXTREMITY: Status: ACTIVE | Noted: 2022-10-15

## 2022-10-15 PROBLEM — K21.9 GASTROESOPHAGEAL REFLUX DISEASE: Status: ACTIVE | Noted: 2022-10-15

## 2022-10-15 PROBLEM — G47.00 INSOMNIA: Status: ACTIVE | Noted: 2022-10-15

## 2022-10-15 PROBLEM — B35.1 ONYCHOMYCOSIS: Status: ACTIVE | Noted: 2022-10-15

## 2022-10-15 PROBLEM — E55.9 VITAMIN D DEFICIENCY: Status: ACTIVE | Noted: 2022-10-15

## 2022-10-15 LAB
25(OH)D3 SERPL-MCNC: 67.3 NG/ML (ref 30–100)
ALBUMIN SERPL-MCNC: 5 G/DL (ref 3.5–5.2)
ALBUMIN/GLOB SERPL: 1.9 G/DL
ALP SERPL-CCNC: 92 U/L (ref 39–117)
ALT SERPL W P-5'-P-CCNC: 31 U/L (ref 1–41)
ANION GAP SERPL CALCULATED.3IONS-SCNC: 12.7 MMOL/L (ref 5–15)
AST SERPL-CCNC: 29 U/L (ref 1–40)
BILIRUB SERPL-MCNC: 0.7 MG/DL (ref 0–1.2)
BUN SERPL-MCNC: 15 MG/DL (ref 6–20)
BUN/CREAT SERPL: 16.9 (ref 7–25)
CALCIUM SPEC-SCNC: 9.9 MG/DL (ref 8.6–10.5)
CHLORIDE SERPL-SCNC: 102 MMOL/L (ref 98–107)
CO2 SERPL-SCNC: 26.3 MMOL/L (ref 22–29)
CREAT SERPL-MCNC: 0.89 MG/DL (ref 0.76–1.27)
EGFRCR SERPLBLD CKD-EPI 2021: 102.5 ML/MIN/1.73
GLOBULIN UR ELPH-MCNC: 2.6 GM/DL
GLUCOSE SERPL-MCNC: 104 MG/DL (ref 65–99)
HCV AB SER DONR QL: NORMAL
HIV1+2 AB SER QL: NORMAL
POTASSIUM SERPL-SCNC: 4.3 MMOL/L (ref 3.5–5.2)
PROT SERPL-MCNC: 7.6 G/DL (ref 6–8.5)
SODIUM SERPL-SCNC: 141 MMOL/L (ref 136–145)

## 2022-10-16 PROBLEM — Z12.2 ENCOUNTER FOR SCREENING FOR LUNG CANCER: Status: ACTIVE | Noted: 2022-10-16

## 2022-10-16 NOTE — ASSESSMENT & PLAN NOTE
- controlled, last seizure was 1 year annamaria  - continue Keppra 1000mg twice daily and zonisamide 100mg daily  - will place referral to neurology

## 2022-10-16 NOTE — ASSESSMENT & PLAN NOTE
- pt with history of sinus bradycardia, was supposed to follow with cardiologist and get ECHO, but moved  - referral placed per pt request

## 2022-10-16 NOTE — ASSESSMENT & PLAN NOTE
- history of vitamin D deficiency  - will obtain Vitamin D level today  - continue vitamin D supplementation for now

## 2022-10-16 NOTE — ASSESSMENT & PLAN NOTE
- discussed diet and exercise: exercising at least 30 minutes/day, 5x/week and eating more fresh fruits and vegetables and limiting intake of fatty foods - particularly fast food  - recommended annual eye and dental visits  - had colonoscopy about 1 year ago a  - saw Dr. Cardarelli, reportedly normal  - influenza and COVID vaccines administered today  - declines shingles vaccine today

## 2022-10-16 NOTE — ASSESSMENT & PLAN NOTE
-  pt had stress test done which showed an EF of 41 - 45% and moderately decreased left ventricular systolic function]  - pt reports that her does get some swelling in his feet intermittently, denies SOA  - likely would not be able to tolerate BB due to low HR  - last lipid panel was ASCVD risk of 9.3%, will discuss starting statin at follow up  - will refer to cardiology per pt request

## 2022-10-16 NOTE — ASSESSMENT & PLAN NOTE
- left thumb, has tried 2 topical creams with no success  - will start terbinafine for 6 weeks  - follow up in 4 to assess efficacy and potentially get labs (CMP) to monitor liver function

## 2022-10-16 NOTE — ASSESSMENT & PLAN NOTE
- has approximately 74 pack year history  - has tried patches but did not work, declines further smoking cessation pharmacologic agents  - okay with CT lung cancer screen

## 2022-11-02 ENCOUNTER — APPOINTMENT (OUTPATIENT)
Dept: CARDIOLOGY | Facility: HOSPITAL | Age: 52
End: 2022-11-02

## 2022-11-03 ENCOUNTER — APPOINTMENT (OUTPATIENT)
Dept: CT IMAGING | Facility: HOSPITAL | Age: 52
End: 2022-11-03

## 2022-11-11 ENCOUNTER — OFFICE VISIT (OUTPATIENT)
Dept: FAMILY MEDICINE CLINIC | Facility: CLINIC | Age: 52
End: 2022-11-11

## 2022-11-11 ENCOUNTER — CLINICAL SUPPORT (OUTPATIENT)
Dept: FAMILY MEDICINE CLINIC | Facility: CLINIC | Age: 52
End: 2022-11-11
Payer: COMMERCIAL

## 2022-11-11 ENCOUNTER — LAB (OUTPATIENT)
Dept: LAB | Facility: HOSPITAL | Age: 52
End: 2022-11-11

## 2022-11-11 VITALS
WEIGHT: 247.8 LBS | OXYGEN SATURATION: 99 % | SYSTOLIC BLOOD PRESSURE: 114 MMHG | HEIGHT: 68 IN | DIASTOLIC BLOOD PRESSURE: 68 MMHG | RESPIRATION RATE: 20 BRPM | TEMPERATURE: 98.2 F | HEART RATE: 55 BPM | BODY MASS INDEX: 37.56 KG/M2

## 2022-11-11 DIAGNOSIS — B35.1 ONYCHOMYCOSIS: ICD-10-CM

## 2022-11-11 DIAGNOSIS — Z23 NEED FOR COVID-19 VACCINE: ICD-10-CM

## 2022-11-11 DIAGNOSIS — G40.909 NONINTRACTABLE EPILEPSY WITHOUT STATUS EPILEPTICUS, UNSPECIFIED EPILEPSY TYPE: ICD-10-CM

## 2022-11-11 DIAGNOSIS — M54.50 CHRONIC BILATERAL LOW BACK PAIN WITHOUT SCIATICA: ICD-10-CM

## 2022-11-11 DIAGNOSIS — Z23 IMMUNIZATION DUE: Primary | ICD-10-CM

## 2022-11-11 DIAGNOSIS — G89.29 CHRONIC BILATERAL LOW BACK PAIN WITHOUT SCIATICA: ICD-10-CM

## 2022-11-11 DIAGNOSIS — B35.1 ONYCHOMYCOSIS: Primary | ICD-10-CM

## 2022-11-11 PROCEDURE — 0051A COVID-19 (PFIZER) 12+ YRS: CPT | Performed by: STUDENT IN AN ORGANIZED HEALTH CARE EDUCATION/TRAINING PROGRAM

## 2022-11-11 PROCEDURE — 99214 OFFICE O/P EST MOD 30 MIN: CPT | Performed by: STUDENT IN AN ORGANIZED HEALTH CARE EDUCATION/TRAINING PROGRAM

## 2022-11-11 PROCEDURE — 80053 COMPREHEN METABOLIC PANEL: CPT

## 2022-11-11 PROCEDURE — 91305 COVID-19 (PFIZER) 12+ YRS: CPT | Performed by: STUDENT IN AN ORGANIZED HEALTH CARE EDUCATION/TRAINING PROGRAM

## 2022-11-11 RX ORDER — ZONISAMIDE 100 MG/1
300 CAPSULE ORAL
Qty: 90 CAPSULE | Refills: 2 | Status: SHIPPED | OUTPATIENT
Start: 2022-11-11 | End: 2022-11-11

## 2022-11-11 RX ORDER — ZONISAMIDE 100 MG/1
300 CAPSULE ORAL
Qty: 90 CAPSULE | Refills: 2 | Status: SHIPPED | OUTPATIENT
Start: 2022-11-11 | End: 2023-01-25 | Stop reason: SDUPTHER

## 2022-11-11 RX ORDER — LEVETIRACETAM 1000 MG/1
1000 TABLET ORAL 2 TIMES DAILY
Qty: 60 TABLET | Refills: 2 | Status: SHIPPED | OUTPATIENT
Start: 2022-11-11 | End: 2022-11-29

## 2022-11-11 RX ORDER — LEVETIRACETAM 1000 MG/1
1000 TABLET ORAL 2 TIMES DAILY
Qty: 60 TABLET | Refills: 2 | Status: SHIPPED | OUTPATIENT
Start: 2022-11-11 | End: 2022-11-11

## 2022-11-11 NOTE — ASSESSMENT & PLAN NOTE
- controlled, last seizure was 1 year ago  - continue Keppra 1000mg twice daily and zonisamide 100mg daily -refill today

## 2022-11-11 NOTE — PROGRESS NOTES
Office Note     Name: Mayito Bassett    : 1970     MRN: [id number]     Chief Complaint  onycomyosis (4 week fup ) and Back Pain    Subjective     History of Present Illness:  Mayito Bassett is a 52 y.o. male who presents today for follow-up on onychomycosis and back pain.  He also needs refills on his seizure medicines.    Epilepsy: Patient has a history of epilepsy and is currently on Keppra 1000 mg twice daily and zonisamide 300 mg every night at bedtime.  He is seizure-free for 1 year.  He does follow with a neurologist and has has appointment in January.  He does need medications until then.    Onychomycosis: Patient has had onychomycosis of his left thumb for quite some time.  Has tried to topical creams with no success.  He has been on terbinafine 250 mg once daily and is currently at the 4-week angie.  He does report some improvement in the onychomycosis.  He is willing to get liver function testing today.    Chronic low back pain: Patient reports chronic low back pain across the lower back that is sharp in nature and can radiate up his back.  He denies any radiation of symptoms into his legs.  He does report that the pain is worse when he is standing and better when he is sitting.  He has tried some ibuprofen with little improvement in symptoms.            Objective     Past Medical History:   Diagnosis Date   • Anxiety    • Arthritis    • Chronic back pain    • Congestive heart failure (CHF) (HCC)    • Depression    • GERD (gastroesophageal reflux disease)    • Obesity (BMI 30-39.9)    • Seizures (HCC)      No past surgical history on file.  Family History   Problem Relation Age of Onset   • Heart disease Mother    • Kidney disease Mother    • Diabetes Mother    • No Known Problems Sister    • No Known Problems Brother    • No Known Problems Maternal Grandmother    • No Known Problems Maternal Grandfather    • No Known Problems Paternal Grandmother    • No Known Problems Paternal  "Grandfather    • No Known Problems Daughter    • No Known Problems Son    • No Known Problems Cousin    • Rheum arthritis Neg Hx    • Osteoarthritis Neg Hx    • Asthma Neg Hx    • Heart failure Neg Hx    • Hyperlipidemia Neg Hx    • Hypertension Neg Hx    • Migraines Neg Hx    • Rashes / Skin problems Neg Hx    • Seizures Neg Hx    • Stroke Neg Hx    • Thyroid disease Neg Hx        Vital Signs  /68 (BP Location: Left arm, Patient Position: Sitting, Cuff Size: Adult)   Pulse 55   Temp 98.2 °F (36.8 °C) (Infrared)   Resp 20   Ht 172.7 cm (68\")   Wt 112 kg (247 lb 12.8 oz)   SpO2 99%   BMI 37.68 kg/m²   Estimated body mass index is 37.68 kg/m² as calculated from the following:    Height as of this encounter: 172.7 cm (68\").    Weight as of this encounter: 112 kg (247 lb 12.8 oz).    Physical Exam  Vitals reviewed.   Constitutional:       Appearance: Normal appearance.   HENT:      Head: Normocephalic and atraumatic.   Cardiovascular:      Rate and Rhythm: Normal rate and regular rhythm.   Pulmonary:      Effort: Pulmonary effort is normal.      Breath sounds: Normal breath sounds.   Abdominal:      General: Abdomen is flat.      Palpations: Abdomen is soft.      Tenderness: There is no abdominal tenderness.   Musculoskeletal:      Comments: Tenderness to palpation along the entire lower portion of back   Skin:     Comments: Onychomycosis of left thumb nail   Neurological:      Mental Status: He is alert.               Assessment and Plan     Diagnoses and all orders for this visit:    1. Onychomycosis (Primary)  Assessment & Plan:  - Left thumb, has tried 2 topical creams with no success  - Mild improvement after 4 weeks of terbinafine  - Obtaining CMP today and then will continue terbinafine for 2 more weeks and assess at follow-up again      Orders:  -     Comprehensive Metabolic Panel; Future    2. Nonintractable epilepsy without status epilepticus, unspecified epilepsy type (HCC)  Assessment & Plan:  - " controlled, last seizure was 1 year ago  - continue Keppra 1000mg twice daily and zonisamide 100mg daily -refill today    Orders:  -     Discontinue: levETIRAcetam (KEPPRA) 1000 MG tablet; Take 1 tablet by mouth 2 (Two) Times a Day. To control seizures  Dispense: 60 tablet; Refill: 2  -     Discontinue: zonisamide (ZONEGRAN) 100 MG capsule; Take 3 capsules by mouth every night at bedtime. For prevention of seizures  Dispense: 90 capsule; Refill: 2  -     levETIRAcetam (KEPPRA) 1000 MG tablet; Take 1 tablet by mouth 2 (Two) Times a Day. To control seizures  Dispense: 60 tablet; Refill: 2  -     zonisamide (ZONEGRAN) 100 MG capsule; Take 3 capsules by mouth every night at bedtime. For prevention of seizures  Dispense: 90 capsule; Refill: 2    3. Chronic bilateral low back pain without sciatica  Assessment & Plan:  - Patient with low back pain for quite some time with pain originating in the right side of his low back and radiating to the left and up his spine sometimes, no radiation of symptoms into his lower extremities  - Recommend discontinuing ibuprofen and will start diclofenac twice daily scheduled for the next 2 weeks  - If no improvement in symptoms, will get x-ray at next visit and likely referral to physical therapy    Orders:  -     diclofenac (VOLTAREN) 50 MG EC tablet; Take 1 tablet by mouth 2 (Two) Times a Day for 14 days.  Dispense: 28 tablet; Refill: 0    Other orders  -     Discontinue: diclofenac (VOLTAREN) 50 MG EC tablet; Take 1 tablet by mouth 2 (Two) Times a Day for 14 days.  Dispense: 28 tablet; Refill: 0        Follow Up  Return in about 2 weeks (around 11/25/2022) for onychomycosis and back pain.    Irene Chi MD

## 2022-11-11 NOTE — ASSESSMENT & PLAN NOTE
- Patient with low back pain for quite some time with pain originating in the right side of his low back and radiating to the left and up his spine sometimes, no radiation of symptoms into his lower extremities  - Recommend discontinuing ibuprofen and will start diclofenac twice daily scheduled for the next 2 weeks  - If no improvement in symptoms, will get x-ray at next visit and likely referral to physical therapy

## 2022-11-11 NOTE — ASSESSMENT & PLAN NOTE
- Left thumb, has tried 2 topical creams with no success  - Mild improvement after 4 weeks of terbinafine  - Obtaining CMP today and then will continue terbinafine for 2 more weeks and assess at follow-up again

## 2022-11-12 LAB
ALBUMIN SERPL-MCNC: 4.3 G/DL (ref 3.5–5.2)
ALBUMIN/GLOB SERPL: 1.5 G/DL
ALP SERPL-CCNC: 80 U/L (ref 39–117)
ALT SERPL W P-5'-P-CCNC: 27 U/L (ref 1–41)
ANION GAP SERPL CALCULATED.3IONS-SCNC: 8.2 MMOL/L (ref 5–15)
AST SERPL-CCNC: 18 U/L (ref 1–40)
BILIRUB SERPL-MCNC: 0.4 MG/DL (ref 0–1.2)
BUN SERPL-MCNC: 13 MG/DL (ref 6–20)
BUN/CREAT SERPL: 13 (ref 7–25)
CALCIUM SPEC-SCNC: 9.5 MG/DL (ref 8.6–10.5)
CHLORIDE SERPL-SCNC: 105 MMOL/L (ref 98–107)
CO2 SERPL-SCNC: 26.8 MMOL/L (ref 22–29)
CREAT SERPL-MCNC: 1 MG/DL (ref 0.76–1.27)
EGFRCR SERPLBLD CKD-EPI 2021: 90 ML/MIN/1.73
GLOBULIN UR ELPH-MCNC: 2.9 GM/DL
GLUCOSE SERPL-MCNC: 91 MG/DL (ref 65–99)
POTASSIUM SERPL-SCNC: 4.1 MMOL/L (ref 3.5–5.2)
PROT SERPL-MCNC: 7.2 G/DL (ref 6–8.5)
SODIUM SERPL-SCNC: 140 MMOL/L (ref 136–145)

## 2022-11-16 ENCOUNTER — HOSPITAL ENCOUNTER (OUTPATIENT)
Dept: CT IMAGING | Facility: HOSPITAL | Age: 52
Discharge: HOME | End: 2022-11-16
Admitting: STUDENT IN AN ORGANIZED HEALTH CARE EDUCATION/TRAINING PROGRAM

## 2022-11-16 DIAGNOSIS — Z72.0 TOBACCO ABUSE: ICD-10-CM

## 2022-11-16 DIAGNOSIS — Z12.2 ENCOUNTER FOR SCREENING FOR LUNG CANCER: ICD-10-CM

## 2022-11-16 PROCEDURE — 71271 CT THORAX LUNG CANCER SCR C-: CPT

## 2022-11-16 RX ORDER — LORAZEPAM 0.5 MG/1
0.5 TABLET ORAL ONCE
Qty: 1 TABLET | Refills: 0 | Status: SHIPPED | OUTPATIENT
Start: 2022-11-16 | End: 2022-11-16

## 2022-11-28 ENCOUNTER — TELEPHONE (OUTPATIENT)
Dept: FAMILY MEDICINE CLINIC | Facility: CLINIC | Age: 52
End: 2022-11-28

## 2022-11-28 NOTE — PROGRESS NOTES
[facility]   Consult  Mayito Bassett  1970    VISIT DATE:  11/29/22    PCP:   Irene Chi MD  [address] [facility] [address] [place], [place] [address]        CC:  Bradycardia, sinus      Problem List:  1.  Systolic dysfunction  2.  Bradycardia  3.  Tobacco abuse  4.  Epilepsy    Holter monitor July 2022: Benign study with short runs of PAT, 10 beats of accelerated junctional rhythm.  Avg HR 50s, no AV block    Stress echo August 2020: EF 41 to 45%, no evidence of ischemia, normal augmentation to dobutamine,  No significant coronary calcification on CT chest        History of Present Illness:  Mayito Bassett  Is a 52 y.o. male with pertinent cardiac history detailed above.  Patient previously seen in [place].  A stress echo there showed a reduced resting EF reported as 40 to 45% but visually appears lower.  There was no evidence of ischemic response, he appeared to have appropriate augmentation.  However he does also have a long-term smoking history and has been endorsing chest pressure like someone sitting on his chest with mild to moderate exertion.  Additional issue is sinus bradycardia.  This was first detected on a screening EKG in July..  Had a benign 48-hour Holter in [place].  His average heart rate was low at 57 but there were no AV blocks.  His EKG today shows bradycardia but no ischemic changes.  He does take an aspirin 81 mg daily.  Otherwise he is just on antiseizure medications.  He has not had any recent episodes of syncope.  Resting blood pressure in the office is normal..      Patient Active Problem List    Diagnosis Date Noted   • Nonintractable epilepsy without status epilepticus (HCC) 11/11/2022   • Chronic bilateral low back pain without sciatica 11/11/2022   • Encounter for screening for lung cancer 10/16/2022   • Annual physical exam 10/15/2022   • Vitamin D deficiency 10/15/2022   • Onychomycosis 10/15/2022   • Gastroesophageal reflux disease  10/15/2022   • Muscle spasms of lower extremity 10/15/2022   • Insomnia 10/15/2022   • Low HDL (under 40) 09/13/2022   • Obesity (BMI 30-39.9) 09/13/2022   • Dorsalgia 09/13/2022   • Seizure disorder (HCC) 09/13/2022   • Chronic systolic (congestive) heart failure (formerly [facility]) 09/13/2022   • Bradycardia, sinus 07/27/2022   • EKG abnormalities 07/27/2022   • Tobacco abuse 07/27/2022   • 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 (formerly [facility]) 07/27/2022       No Known Allergies    Social History     Socioeconomic History   • Marital status: Single   Tobacco Use   • Smoking status: Heavy Smoker     Packs/day: 1.00     Years: 37.00     Pack years: 37.00     Types: Cigarettes   • Smokeless tobacco: Never   Vaping Use   • Vaping Use: Never used   Substance and Sexual Activity   • Alcohol use: Yes     Comment: OCCASIONAL   • Drug use: Never     Types: Marijuana   • Sexual activity: Not Currently     Partners: Female     Birth control/protection: None       Family History   Problem Relation Age of Onset   • Heart disease Mother    • Kidney disease Mother    • Diabetes Mother    • No Known Problems Sister    • No Known Problems Brother    • No Known Problems Maternal Grandmother    • No Known Problems Maternal Grandfather    • No Known Problems Paternal Grandmother    • No Known Problems Paternal Grandfather    • No Known Problems Daughter    • No Known Problems Son    • No Known Problems Cousin    • Rheum arthritis Neg Hx    • Osteoarthritis Neg Hx    • Asthma Neg Hx    • Heart failure Neg Hx    • Hyperlipidemia Neg Hx    • Hypertension Neg Hx    • Migraines Neg Hx    • Rashes / Skin problems Neg Hx    • Seizures Neg Hx    • Stroke Neg Hx    • Thyroid disease Neg Hx        Current Medications:    Current Outpatient Medications:   •  aspirin 81 MG EC tablet, Take 81 mg by mouth Daily., Disp: , Rfl:   •  cyclobenzaprine (FLEXERIL) 10 MG tablet, Take 1 tablet by mouth At Night As Needed  "for Muscle Spasms., Disp: 30 tablet, Rfl: 5  •  famotidine (Pepcid) 20 MG tablet, Take 1 tablet by mouth 2 (Two) Times a Day. To control acid reflux and heartburn, Disp: 60 tablet, Rfl: 5  •  Isopropyl Alcohol 95 % liquid, Administer 3 drops into ear(s) as directed by provider 2 (Two) Times a Day. To dry up ear wetness, Disp: 29.57 mL, Rfl: 2  •  levETIRAcetam (KEPPRA) 1000 MG tablet, Take 1,000 mg by mouth 2 (Two) Times a Day., Disp: , Rfl:   •  Multiple Vitamin (MULTI VITAMIN MENS PO), Take  by mouth., Disp: , Rfl:   •  neomycin-polymyxin-hydrocortisone (CORTISPORIN) 3.5-91403-4 otic solution, INSTILL 4 DROPS INTO AFFECTED EAR EVERY 6 HOURS FOR 5 DAYS, Disp: , Rfl:   •  traZODone (DESYREL) 100 MG tablet, Take 1 tablet by mouth Every Night. For sleep and mood, Disp: 30 tablet, Rfl: 5  •  zonisamide (ZONEGRAN) 100 MG capsule, Take 3 capsules by mouth every night at bedtime. For prevention of seizures, Disp: 90 capsule, Rfl: 2  •  nicotine (NICODERM CQ) 21 MG/24HR patch, Place 1 patch on the skin as directed by provider Daily., Disp: 28 each, Rfl: 2  •  nicotine (Nicoderm CQ) 21 MG/24HR patch, Place 1 patch on the skin as directed by provider Daily., Disp: 28 each, Rfl: 3  •  valsartan (DIOVAN) 40 MG tablet, Take 1 tablet by mouth Daily., Disp: 30 tablet, Rfl: 6     Review of Systems   Constitutional: Positive for malaise/fatigue.   Cardiovascular: Positive for chest pain and dyspnea on exertion. Negative for leg swelling, palpitations and syncope.   Respiratory: Positive for shortness of breath.        Vitals:    11/29/22 1255   BP: 100/60   BP Location: Right arm   Patient Position: Sitting   Pulse: (!) 45   SpO2: 97%   Weight: 109 kg (240 lb)   Height: 172.7 cm (68\")       Physical Exam  Constitutional:       Appearance: Normal appearance.   Neck:      Vascular: No carotid bruit.   Cardiovascular:      Rate and Rhythm: Regular rhythm. Bradycardia present.      Pulses: Normal pulses.      Heart sounds: Normal heart " sounds.   Pulmonary:      Effort: Pulmonary effort is normal.      Breath sounds: Normal breath sounds.   Musculoskeletal:      Right lower leg: No edema.      Left lower leg: No edema.   Skin:     General: Skin is warm and dry.   Neurological:      General: No focal deficit present.      Mental Status: He is alert.         Diagnostic Data:    ECG 12 Lead    Date/Time: 11/29/2022 1:13 PM  Performed by: Juan Francisco Morin MD  Authorized by: Juan Francisco Morin MD   Comparison: compared with previous ECG from 7/27/2022  Rhythm: sinus bradycardia  Rate: bradycardic  BPM: 45    Clinical impression: abnormal EKG  Clinical impression comment: sinus bradycardia, otherwise normal            Lab Results   Component Value Date    TRIG 119 07/27/2022    HDL 33 (L) 07/27/2022     Lab Results   Component Value Date    GLUCOSE 91 11/11/2022    BUN 13 11/11/2022    CREATININE 1.00 11/11/2022     11/11/2022    K 4.1 11/11/2022     11/11/2022    CO2 26.8 11/11/2022     Lab Results   Component Value Date    HGBA1C 5.20 07/27/2022     Lab Results   Component Value Date    WBC 7.71 07/27/2022    HGB 15.7 07/27/2022    HCT 43.8 07/27/2022     07/27/2022       Assessment:   Diagnosis Plan   1. Cardiomyopathy, dilated (HCC)  ECG 12 Lead    Case Request Cath Lab: Left Heart Cath    Lipid Panel          Plan:      1.  Systolic dysfunction/chest pain  -Stress test showed reduced resting EF 45% visually appears lower, but no evidence of ischemia with normal augmentation  -Since he is having chest pain with exertion and the abnormal resting EF recommend cardiac catheterization for more definitive evaluation of CAD.  Discussed risks and benefits.  He is agreeable to proceed.  -Continue aspirin 81 mg daily  -Check lipid panel day of heart catheterization  -Add valsartan 40 mg daily for LV dysfunction titrate as able    2.  Sinus bradycardia  -EKG today 45 bpm  -Holter average 57 bpm, no AV block  -Unable to initiate  beta-blockers at this time  -Pending further evaluation he may ultimately be a pacemaker candidate so that he can tolerate goal-directed medical therapy    3.  Tobacco use  3 ppd smoker  He wants to quit.  He wants to try nicotine patches.  These were prescribed today    Follow-up after cardiac catheterization      Juan Francisco Morin MD [facility]

## 2022-11-28 NOTE — TELEPHONE ENCOUNTER
Caller: Mayito Bassett    Relationship: Self    Best call back number: [phone]    What medication are you requesting: NICOTINE PATCHES    What are your current symptoms: QUIT SMOKING    Have you been treated for these symptoms before:   [x] Yes  [] No    If a prescription is needed, what is your preferred pharmacy and phone number: [facility] [address] [place] [address] [phone] [facility] [phone]      Additional notes: PATIENT HAS BEEN DISCUSSING THIS WITH DR ANAYA AND WOULD LIKE TO START PATCHES OR SOMETHING TO HELP

## 2022-11-29 ENCOUNTER — OFFICE VISIT (OUTPATIENT)
Dept: CARDIOLOGY | Facility: CLINIC | Age: 52
End: 2022-11-29

## 2022-11-29 VITALS
WEIGHT: 240 LBS | DIASTOLIC BLOOD PRESSURE: 60 MMHG | SYSTOLIC BLOOD PRESSURE: 100 MMHG | OXYGEN SATURATION: 97 % | HEIGHT: 68 IN | BODY MASS INDEX: 36.37 KG/M2 | HEART RATE: 45 BPM

## 2022-11-29 DIAGNOSIS — I42.0 CARDIOMYOPATHY, DILATED: Primary | ICD-10-CM

## 2022-11-29 PROCEDURE — 99244 OFF/OP CNSLTJ NEW/EST MOD 40: CPT | Performed by: INTERNAL MEDICINE

## 2022-11-29 PROCEDURE — 93000 ELECTROCARDIOGRAM COMPLETE: CPT | Performed by: INTERNAL MEDICINE

## 2022-11-29 RX ORDER — LEVETIRACETAM 1000 MG/1
1000 TABLET ORAL 2 TIMES DAILY
COMMUNITY
End: 2023-01-25 | Stop reason: SDUPTHER

## 2022-11-29 RX ORDER — NICOTINE 21 MG/24HR
1 PATCH, TRANSDERMAL 24 HOURS TRANSDERMAL EVERY 24 HOURS
Qty: 28 EACH | Refills: 3 | Status: SHIPPED | OUTPATIENT
Start: 2022-11-29 | End: 2022-12-05

## 2022-11-29 RX ORDER — NICOTINE 21 MG/24HR
1 PATCH, TRANSDERMAL 24 HOURS TRANSDERMAL EVERY 24 HOURS
Qty: 28 EACH | Refills: 2 | Status: SHIPPED | OUTPATIENT
Start: 2022-11-29 | End: 2023-02-20 | Stop reason: SDUPTHER

## 2022-11-29 RX ORDER — VALSARTAN 40 MG/1
40 TABLET ORAL DAILY
Qty: 30 TABLET | Refills: 6 | Status: SHIPPED | OUTPATIENT
Start: 2022-11-29

## 2022-12-05 ENCOUNTER — OFFICE VISIT (OUTPATIENT)
Dept: FAMILY MEDICINE CLINIC | Facility: CLINIC | Age: 52
End: 2022-12-05

## 2022-12-05 VITALS
SYSTOLIC BLOOD PRESSURE: 130 MMHG | TEMPERATURE: 98.5 F | OXYGEN SATURATION: 99 % | HEART RATE: 70 BPM | HEIGHT: 68 IN | WEIGHT: 250.4 LBS | BODY MASS INDEX: 37.95 KG/M2 | DIASTOLIC BLOOD PRESSURE: 84 MMHG

## 2022-12-05 DIAGNOSIS — Z72.0 TOBACCO ABUSE: ICD-10-CM

## 2022-12-05 DIAGNOSIS — B35.1 ONYCHOMYCOSIS: ICD-10-CM

## 2022-12-05 DIAGNOSIS — M54.50 CHRONIC BILATERAL LOW BACK PAIN WITHOUT SCIATICA: Primary | ICD-10-CM

## 2022-12-05 DIAGNOSIS — G89.29 CHRONIC BILATERAL LOW BACK PAIN WITHOUT SCIATICA: Primary | ICD-10-CM

## 2022-12-05 PROCEDURE — 99214 OFFICE O/P EST MOD 30 MIN: CPT | Performed by: STUDENT IN AN ORGANIZED HEALTH CARE EDUCATION/TRAINING PROGRAM

## 2022-12-05 RX ORDER — TRAMADOL HYDROCHLORIDE 50 MG/1
50 TABLET ORAL 2 TIMES DAILY PRN
Qty: 28 TABLET | Refills: 0 | Status: SHIPPED | OUTPATIENT
Start: 2022-12-05 | End: 2022-12-19

## 2022-12-05 RX ORDER — METHYLPREDNISOLONE 4 MG/1
TABLET ORAL
Qty: 21 TABLET | Refills: 0 | Status: SHIPPED | OUTPATIENT
Start: 2022-12-05 | End: 2023-01-25

## 2022-12-05 NOTE — ASSESSMENT & PLAN NOTE
- Patient with heavy smoking history  - We will continue patches, patient has not smoked for about a week since being on the

## 2022-12-05 NOTE — PROGRESS NOTES
Office Note     Name: Mayito Bassett    : 1970     MRN: [id number]     Chief Complaint  Nail Problem (2w FUP ) and Back Pain    Subjective     History of Present Illness:  Mayito Bassett is a 52 y.o. male who presents today for onychomycosis and low back pain.    Onychomycosis: Present on his left thumb.  Patient has been on terbinafine for 7 weeks with little improvement in his symptoms.  No history of trauma to the nail.  Patient would like a dermatology referral.    Chronic low back pain: Patient has had low back pain for quite some time with pain originating in the right side of his low back and radiating into right hip.  We started him on a 2-week course of diclofenac, helped some. Hurts more when he works. Has not been to work since last week because back is hurting so badly. Aching pain.  Pain is always there but the intensity varies.  Intensity can be anywhere from a 6-10.  Patient is willing to try physical therapy.      Tobacco dependence: Has not smoked since starting the patches.         Objective     Past Medical History:   Diagnosis Date   • Anxiety    • Arthritis    • Asthma    • Cancer (HCC)    • Chronic back pain    • Congestive heart failure (CHF) (HCC)    • Depression    • GERD (gastroesophageal reflux disease)    • Obesity (BMI 30-39.9)    • Seizures (HCC)      No past surgical history on file.  Family History   Problem Relation Age of Onset   • Heart disease Mother    • Kidney disease Mother    • Diabetes Mother    • No Known Problems Sister    • No Known Problems Brother    • No Known Problems Maternal Grandmother    • No Known Problems Maternal Grandfather    • No Known Problems Paternal Grandmother    • No Known Problems Paternal Grandfather    • No Known Problems Daughter    • No Known Problems Son    • No Known Problems Cousin    • Rheum arthritis Neg Hx    • Osteoarthritis Neg Hx    • Asthma Neg Hx    • Heart failure Neg Hx    • Hyperlipidemia Neg Hx    •  "Hypertension Neg Hx    • Migraines Neg Hx    • Rashes / Skin problems Neg Hx    • Seizures Neg Hx    • Stroke Neg Hx    • Thyroid disease Neg Hx        Vital Signs  /84 (BP Location: Left arm, Patient Position: Sitting, Cuff Size: Adult)   Pulse 70   Temp 98.5 °F (36.9 °C) (Infrared)   Ht 172.7 cm (68\")   Wt 114 kg (250 lb 6.4 oz)   SpO2 99%   BMI 38.07 kg/m²   Estimated body mass index is 38.07 kg/m² as calculated from the following:    Height as of this encounter: 172.7 cm (68\").    Weight as of this encounter: 114 kg (250 lb 6.4 oz).    Physical Exam  Vitals reviewed.   Constitutional:       Appearance: Normal appearance.   HENT:      Head: Normocephalic and atraumatic.   Cardiovascular:      Rate and Rhythm: Regular rhythm. Bradycardia present.   Pulmonary:      Effort: Pulmonary effort is normal. No respiratory distress.   Abdominal:      General: Abdomen is flat.      Palpations: Abdomen is soft.   Musculoskeletal:      Comments: Noticed to palpation over the right lower portion of his back, positive straight leg test on the right   Skin:     General: Skin is warm and dry.   Neurological:      Mental Status: He is alert.               Assessment and Plan     Diagnoses and all orders for this visit:    1. Chronic bilateral low back pain without sciatica (Primary)  Assessment & Plan:  - Patient with low back pain for quite some time with pain originating in the right side of his low back and radiating right hip, positive straight leg test on the right  - Has trialed diclofenac and it did not help  - We will obtain x-ray today and refer her to physical therapy  - We will start Medrol Dosepak  - Tramadol prescribed for pain control, discussed addictive nature of medicine and that this is for short-term      Orders:  -     XR Spine Lumbar 4+ View; Future  -     Ambulatory Referral to Physical Therapy Evaluate and treat  -     traMADol (ULTRAM) 50 MG tablet; Take 1 tablet by mouth 2 (Two) Times a Day As " Needed for Moderate Pain for up to 14 days.  Dispense: 28 tablet; Refill: 0  -     methylPREDNISolone (MEDROL) 4 MG dose pack; Take as directed on package instructions.  Dispense: 21 tablet; Refill: 0    2. Onychomycosis  Assessment & Plan:  - Patient is status post a 6-week course of terbinafine with little improvement in his left fingernail  - We will refer to dermatology for further evaluation and treatment    Orders:  -     Ambulatory Referral to Dermatology    3. Tobacco abuse  Assessment & Plan:  - Patient with heavy smoking history  - We will continue patches, patient has not smoked for about a week since being on the          Follow Up  Return in about 12 weeks (around 2/27/2023) for chronic care follow up .    Irene Chi MD

## 2022-12-05 NOTE — ASSESSMENT & PLAN NOTE
- Patient with low back pain for quite some time with pain originating in the right side of his low back and radiating right hip, positive straight leg test on the right  - Has trialed diclofenac and it did not help  - We will obtain x-ray today and refer her to physical therapy  - We will start Medrol Dosepak  - Tramadol prescribed for pain control, discussed addictive nature of medicine and that this is for short-term

## 2022-12-05 NOTE — ASSESSMENT & PLAN NOTE
- Patient is status post a 6-week course of terbinafine with little improvement in his left fingernail  - We will refer to dermatology for further evaluation and treatment

## 2022-12-06 ENCOUNTER — HOSPITAL ENCOUNTER (OUTPATIENT)
Facility: HOSPITAL | Age: 52
Setting detail: HOSPITAL OUTPATIENT SURGERY
Discharge: HOME | End: 2022-12-06
Attending: INTERNAL MEDICINE | Admitting: INTERNAL MEDICINE

## 2022-12-06 VITALS
SYSTOLIC BLOOD PRESSURE: 103 MMHG | OXYGEN SATURATION: 96 % | RESPIRATION RATE: 18 BRPM | HEART RATE: 55 BPM | DIASTOLIC BLOOD PRESSURE: 71 MMHG | WEIGHT: 250.6 LBS | HEIGHT: 68 IN | BODY MASS INDEX: 37.98 KG/M2 | TEMPERATURE: 97.8 F

## 2022-12-06 DIAGNOSIS — I42.0 CARDIOMYOPATHY, DILATED: ICD-10-CM

## 2022-12-06 LAB
ANION GAP SERPL CALCULATED.3IONS-SCNC: 10 MMOL/L (ref 5–15)
BUN SERPL-MCNC: 13 MG/DL (ref 6–20)
BUN/CREAT SERPL: 14.1 (ref 7–25)
CALCIUM SPEC-SCNC: 8.7 MG/DL (ref 8.6–10.5)
CHLORIDE SERPL-SCNC: 104 MMOL/L (ref 98–107)
CHOLEST SERPL-MCNC: 138 MG/DL (ref 0–200)
CO2 SERPL-SCNC: 26 MMOL/L (ref 22–29)
CREAT BLDA-MCNC: 0.9 MG/DL (ref 0.6–1.3)
CREAT SERPL-MCNC: 0.92 MG/DL (ref 0.76–1.27)
DEPRECATED RDW RBC AUTO: 40.9 FL (ref 37–54)
EGFRCR SERPLBLD CKD-EPI 2021: 99.5 ML/MIN/1.73
ERYTHROCYTE [DISTWIDTH] IN BLOOD BY AUTOMATED COUNT: 12.1 % (ref 12.3–15.4)
GLUCOSE SERPL-MCNC: 111 MG/DL (ref 65–99)
HCT VFR BLD AUTO: 43.1 % (ref 37.5–51)
HDLC SERPL-MCNC: 39 MG/DL (ref 40–60)
HGB BLD-MCNC: 15.3 G/DL (ref 13–17.7)
LDLC SERPL CALC-MCNC: 83 MG/DL (ref 0–100)
LDLC/HDLC SERPL: 2.12 {RATIO}
MCH RBC QN AUTO: 32.8 PG (ref 26.6–33)
MCHC RBC AUTO-ENTMCNC: 35.5 G/DL (ref 31.5–35.7)
MCV RBC AUTO: 92.5 FL (ref 79–97)
PLATELET # BLD AUTO: 169 10*3/MM3 (ref 140–450)
PMV BLD AUTO: 10.1 FL (ref 6–12)
POTASSIUM SERPL-SCNC: 3.8 MMOL/L (ref 3.5–5.2)
RBC # BLD AUTO: 4.66 10*6/MM3 (ref 4.14–5.8)
SODIUM SERPL-SCNC: 140 MMOL/L (ref 136–145)
TRIGL SERPL-MCNC: 82 MG/DL (ref 0–150)
VLDLC SERPL-MCNC: 16 MG/DL (ref 5–40)
WBC NRBC COR # BLD: 8.64 10*3/MM3 (ref 3.4–10.8)

## 2022-12-06 PROCEDURE — 93458 L HRT ARTERY/VENTRICLE ANGIO: CPT | Performed by: INTERNAL MEDICINE

## 2022-12-06 PROCEDURE — 25010000002 FENTANYL CITRATE (PF) 50 MCG/ML SOLUTION: Performed by: INTERNAL MEDICINE

## 2022-12-06 PROCEDURE — 80048 BASIC METABOLIC PNL TOTAL CA: CPT | Performed by: INTERNAL MEDICINE

## 2022-12-06 PROCEDURE — 25010000002 HEPARIN (PORCINE) PER 1000 UNITS: Performed by: INTERNAL MEDICINE

## 2022-12-06 PROCEDURE — 93005 ELECTROCARDIOGRAM TRACING: CPT | Performed by: INTERNAL MEDICINE

## 2022-12-06 PROCEDURE — 80061 LIPID PANEL: CPT | Performed by: INTERNAL MEDICINE

## 2022-12-06 PROCEDURE — C1894 INTRO/SHEATH, NON-LASER: HCPCS | Performed by: INTERNAL MEDICINE

## 2022-12-06 PROCEDURE — C1769 GUIDE WIRE: HCPCS | Performed by: INTERNAL MEDICINE

## 2022-12-06 PROCEDURE — 0 IOPAMIDOL PER 1 ML: Performed by: INTERNAL MEDICINE

## 2022-12-06 PROCEDURE — 82565 ASSAY OF CREATININE: CPT

## 2022-12-06 PROCEDURE — 36415 COLL VENOUS BLD VENIPUNCTURE: CPT

## 2022-12-06 PROCEDURE — 0 LIDOCAINE 1 % SOLUTION: Performed by: INTERNAL MEDICINE

## 2022-12-06 PROCEDURE — 25010000002 MIDAZOLAM PER 1 MG: Performed by: INTERNAL MEDICINE

## 2022-12-06 PROCEDURE — 85027 COMPLETE CBC AUTOMATED: CPT | Performed by: INTERNAL MEDICINE

## 2022-12-06 RX ORDER — HEPARIN SODIUM 1000 [USP'U]/ML
INJECTION, SOLUTION INTRAVENOUS; SUBCUTANEOUS
Status: DISCONTINUED | OUTPATIENT
Start: 2022-12-06 | End: 2022-12-06 | Stop reason: HOSPADM

## 2022-12-06 RX ORDER — SODIUM CHLORIDE 9 MG/ML
40 INJECTION, SOLUTION INTRAVENOUS AS NEEDED
Status: DISCONTINUED | OUTPATIENT
Start: 2022-12-06 | End: 2022-12-06 | Stop reason: HOSPADM

## 2022-12-06 RX ORDER — SODIUM CHLORIDE 9 MG/ML
1 INJECTION, SOLUTION INTRAVENOUS CONTINUOUS
Status: DISCONTINUED | OUTPATIENT
Start: 2022-12-06 | End: 2022-12-06

## 2022-12-06 RX ORDER — NICARDIPINE HCL-0.9% SOD CHLOR 1 MG/10 ML
SYRINGE (ML) INTRAVENOUS
Status: DISCONTINUED | OUTPATIENT
Start: 2022-12-06 | End: 2022-12-06 | Stop reason: HOSPADM

## 2022-12-06 RX ORDER — SODIUM CHLORIDE 0.9 % (FLUSH) 0.9 %
10 SYRINGE (ML) INJECTION AS NEEDED
Status: DISCONTINUED | OUTPATIENT
Start: 2022-12-06 | End: 2022-12-06 | Stop reason: HOSPADM

## 2022-12-06 RX ORDER — MIDAZOLAM HYDROCHLORIDE 1 MG/ML
INJECTION INTRAMUSCULAR; INTRAVENOUS
Status: DISCONTINUED | OUTPATIENT
Start: 2022-12-06 | End: 2022-12-06 | Stop reason: HOSPADM

## 2022-12-06 RX ORDER — ASPIRIN 81 MG/1
81 TABLET ORAL DAILY
Status: DISCONTINUED | OUTPATIENT
Start: 2022-12-07 | End: 2022-12-06 | Stop reason: HOSPADM

## 2022-12-06 RX ORDER — ASPIRIN 81 MG/1
324 TABLET, CHEWABLE ORAL ONCE
Status: COMPLETED | OUTPATIENT
Start: 2022-12-06 | End: 2022-12-06

## 2022-12-06 RX ORDER — LIDOCAINE HYDROCHLORIDE 10 MG/ML
INJECTION, SOLUTION INFILTRATION; PERINEURAL
Status: DISCONTINUED | OUTPATIENT
Start: 2022-12-06 | End: 2022-12-06 | Stop reason: HOSPADM

## 2022-12-06 RX ORDER — LEVETIRACETAM 500 MG/1
1000 TABLET ORAL ONCE
Status: COMPLETED | OUTPATIENT
Start: 2022-12-06 | End: 2022-12-06

## 2022-12-06 RX ORDER — FENTANYL CITRATE 50 UG/ML
INJECTION, SOLUTION INTRAMUSCULAR; INTRAVENOUS
Status: DISCONTINUED | OUTPATIENT
Start: 2022-12-06 | End: 2022-12-06 | Stop reason: HOSPADM

## 2022-12-06 RX ORDER — SODIUM CHLORIDE 0.9 % (FLUSH) 0.9 %
10 SYRINGE (ML) INJECTION EVERY 12 HOURS SCHEDULED
Status: DISCONTINUED | OUTPATIENT
Start: 2022-12-06 | End: 2022-12-06 | Stop reason: HOSPADM

## 2022-12-06 RX ADMIN — ASPIRIN 324 MG: 81 TABLET, CHEWABLE ORAL at 08:32

## 2022-12-06 RX ADMIN — LEVETIRACETAM 1000 MG: 500 TABLET, FILM COATED ORAL at 08:28

## 2022-12-06 NOTE — INTERVAL H&P NOTE
H&P reviewed. The patient was examined and there are no changes to the H&P.      The risks, benefits, and alternative options of cardiac catheterization were discussed with the patient.  The risks include death, MI, stroke, infection, vascular injury requiring surgical repair and/or blood transfusion, coronary dissection, renal dysfunction/failure, allergic reaction, emergent CABG.  If PCI is needed there is a 1-2% risk of emergent CABG.  The patient is agreeable for cardiac catheterization, possible PCI or CABG. Plan is to proceed with cardiac catheterization and possible PCI.     Wil Bustos M.D., F.A.C.C.  Interventional Cardiology  12/06/22  08:05 EST

## 2022-12-07 LAB
QT INTERVAL: 430 MS
QTC INTERVAL: 411 MS

## 2022-12-12 ENCOUNTER — TELEPHONE (OUTPATIENT)
Dept: CARDIOLOGY | Facility: CLINIC | Age: 52
End: 2022-12-12

## 2022-12-12 NOTE — TELEPHONE ENCOUNTER
Based on his description does not sound like he is having a vascular problem with it there is no bruising or swelling.  Could we write him off of work for this week and check in with him at the end of this week to see if it is improved

## 2022-12-12 NOTE — TELEPHONE ENCOUNTER
Called pt and gave NSK recommendations above. Pt verbalizes understanding and agreeable to plan.    Letter sent to patient in MyChart.

## 2022-12-12 NOTE — TELEPHONE ENCOUNTER
Pt had [facility] 12/6/2022, right radial access.     Patient called and states that his right wrist is hurting, with movement (bending/twisting) and lifting objects such as taking trash out, moving baskets of clothes etc. Pain is intermittent, not always present. Patient usually starts day without pain but when he starts lifting and moving, his wrist starts hurting. He will rest for the rest of the day, but then the next morning, pain starts again. Patient states that he didn't lift anything heavier than a gallon of milk the first week.     Pt denies tenderness to touch, redness, warmth, bruising or swelling.     Patient is supposed to go back to work tomorrow. Pt works at [employer] and lifts boxes that are 30-40 lbs, maybe heavier.      Please advise,

## 2023-01-04 DIAGNOSIS — K21.9 GASTROESOPHAGEAL REFLUX DISEASE, UNSPECIFIED WHETHER ESOPHAGITIS PRESENT: ICD-10-CM

## 2023-01-04 DIAGNOSIS — F32.1 MODERATE MAJOR DEPRESSION: ICD-10-CM

## 2023-01-04 DIAGNOSIS — R53.83 FATIGUE, UNSPECIFIED TYPE: ICD-10-CM

## 2023-01-04 DIAGNOSIS — G47.00 INSOMNIA, UNSPECIFIED TYPE: ICD-10-CM

## 2023-01-04 RX ORDER — TRAZODONE HYDROCHLORIDE 100 MG/1
TABLET ORAL
Qty: 30 TABLET | Refills: 0 | OUTPATIENT
Start: 2023-01-04

## 2023-01-04 RX ORDER — FAMOTIDINE 20 MG/1
TABLET, FILM COATED ORAL
Qty: 60 TABLET | Refills: 0 | OUTPATIENT
Start: 2023-01-04

## 2023-01-04 NOTE — TELEPHONE ENCOUNTER
Rx Refill Note  Requested Prescriptions     Pending Prescriptions Disp Refills   • famotidine (PEPCID) 20 MG tablet [Pharmacy Med Name: Famotidine 20 MG Oral Tablet] 60 tablet 0     Sig: TAKE 1 TABLET BY MOUTH TWICE DAILY TO  CONTROL  ACID  REFLUX  AND  HEARTBURN   • traZODone (DESYREL) 100 MG tablet [Pharmacy Med Name: traZODone HCl 100 MG Oral Tablet] 30 tablet 0     Sig: TAKE 1 TABLET BY MOUTH ONCE DAILY AT NIGHT FOR SLEEP AND  MOOD      Last office visit with prescribing clinician: 8/23/2022   Last telemedicine visit with prescribing clinician: Visit date not found   Next office visit with prescribing clinician: Visit date not found                         Would you like a call back once the refill request has been completed: [] Yes [] No    If the office needs to give you a call back, can they leave a voicemail: [] Yes [] No    ASHLYN Lazo  01/04/23, 09:00 CST

## 2023-01-07 DIAGNOSIS — G47.00 INSOMNIA, UNSPECIFIED TYPE: ICD-10-CM

## 2023-01-07 DIAGNOSIS — K21.9 GASTROESOPHAGEAL REFLUX DISEASE, UNSPECIFIED WHETHER ESOPHAGITIS PRESENT: ICD-10-CM

## 2023-01-07 DIAGNOSIS — F32.1 MODERATE MAJOR DEPRESSION: ICD-10-CM

## 2023-01-07 DIAGNOSIS — R53.83 FATIGUE, UNSPECIFIED TYPE: ICD-10-CM

## 2023-01-07 RX ORDER — TRAZODONE HYDROCHLORIDE 100 MG/1
100 TABLET ORAL NIGHTLY
Qty: 30 TABLET | Refills: 5 | Status: SHIPPED | OUTPATIENT
Start: 2023-01-07

## 2023-01-07 RX ORDER — FAMOTIDINE 20 MG/1
20 TABLET, FILM COATED ORAL 2 TIMES DAILY
Qty: 60 TABLET | Refills: 5 | Status: SHIPPED | OUTPATIENT
Start: 2023-01-07

## 2023-01-09 DIAGNOSIS — G25.5 MUSCLE, JERKY MOVEMENTS (UNCONTROLLED): ICD-10-CM

## 2023-01-09 DIAGNOSIS — R53.83 FATIGUE, UNSPECIFIED TYPE: ICD-10-CM

## 2023-01-09 RX ORDER — CYCLOBENZAPRINE HCL 10 MG
10 TABLET ORAL NIGHTLY PRN
Qty: 30 TABLET | Refills: 5 | Status: SHIPPED | OUTPATIENT
Start: 2023-01-09

## 2023-01-09 NOTE — TELEPHONE ENCOUNTER
Caller: Mayito Bassett    Relationship: Self    Best call back number: [phone]    Requested Prescriptions:   Requested Prescriptions     Pending Prescriptions Disp Refills   • cyclobenzaprine (FLEXERIL) 10 MG tablet 30 tablet 5     Sig: Take 1 tablet by mouth At Night As Needed for Muscle Spasms.        Pharmacy where request should be sent: [facility] [address] [place] [address] [phone] [facility] [phone] FX     Additional details provided by patient: NEEDS A NEW PRESCRIPTION ORDERED, REFILL FROM PREVIOUS DR. RADHA CHAMBERS    Does the patient have less than a 3 day supply:  [] Yes  [x] No    Would you like a call back once the refill request has been completed: [] Yes [x] No    If the office needs to give you a call back, can they leave a voicemail: [] Yes [] No    Lilian Lang MA   01/09/23 15:11 EST

## 2023-01-25 ENCOUNTER — OFFICE VISIT (OUTPATIENT)
Dept: NEUROLOGY | Facility: CLINIC | Age: 53
End: 2023-01-25
Payer: COMMERCIAL

## 2023-01-25 ENCOUNTER — LAB (OUTPATIENT)
Dept: LAB | Facility: HOSPITAL | Age: 53
End: 2023-01-25
Payer: COMMERCIAL

## 2023-01-25 VITALS
TEMPERATURE: 98.2 F | BODY MASS INDEX: 37.98 KG/M2 | HEART RATE: 78 BPM | DIASTOLIC BLOOD PRESSURE: 72 MMHG | HEIGHT: 68 IN | WEIGHT: 250.6 LBS | OXYGEN SATURATION: 98 % | SYSTOLIC BLOOD PRESSURE: 116 MMHG

## 2023-01-25 DIAGNOSIS — G40.909 NONINTRACTABLE EPILEPSY WITHOUT STATUS EPILEPTICUS, UNSPECIFIED EPILEPSY TYPE: ICD-10-CM

## 2023-01-25 PROBLEM — M54.9 UPPER BACK PAIN: Status: ACTIVE | Noted: 2019-11-15

## 2023-01-25 PROBLEM — E66.01 MORBID OBESITY WITH BODY MASS INDEX (BMI) OF 40.0 OR HIGHER: Status: ACTIVE | Noted: 2022-02-09

## 2023-01-25 LAB
ALBUMIN SERPL-MCNC: 4.4 G/DL (ref 3.5–5.2)
ALBUMIN/GLOB SERPL: 1.8 G/DL
ALP SERPL-CCNC: 87 U/L (ref 39–117)
ALT SERPL W P-5'-P-CCNC: 24 U/L (ref 1–41)
ANION GAP SERPL CALCULATED.3IONS-SCNC: 10.8 MMOL/L (ref 5–15)
AST SERPL-CCNC: 17 U/L (ref 1–40)
BILIRUB SERPL-MCNC: 0.4 MG/DL (ref 0–1.2)
BUN SERPL-MCNC: 12 MG/DL (ref 6–20)
BUN/CREAT SERPL: 12.1 (ref 7–25)
CALCIUM SPEC-SCNC: 9.3 MG/DL (ref 8.6–10.5)
CHLORIDE SERPL-SCNC: 105 MMOL/L (ref 98–107)
CO2 SERPL-SCNC: 25.2 MMOL/L (ref 22–29)
CREAT SERPL-MCNC: 0.99 MG/DL (ref 0.76–1.27)
EGFRCR SERPLBLD CKD-EPI 2021: 91.1 ML/MIN/1.73
GLOBULIN UR ELPH-MCNC: 2.4 GM/DL
GLUCOSE SERPL-MCNC: 101 MG/DL (ref 65–99)
POTASSIUM SERPL-SCNC: 4.1 MMOL/L (ref 3.5–5.2)
PROT SERPL-MCNC: 6.8 G/DL (ref 6–8.5)
SODIUM SERPL-SCNC: 141 MMOL/L (ref 136–145)

## 2023-01-25 PROCEDURE — 99214 OFFICE O/P EST MOD 30 MIN: CPT | Performed by: NURSE PRACTITIONER

## 2023-01-25 PROCEDURE — 36415 COLL VENOUS BLD VENIPUNCTURE: CPT

## 2023-01-25 PROCEDURE — 80053 COMPREHEN METABOLIC PANEL: CPT

## 2023-01-25 PROCEDURE — 80177 DRUG SCRN QUAN LEVETIRACETAM: CPT

## 2023-01-25 PROCEDURE — 85025 COMPLETE CBC W/AUTO DIFF WBC: CPT

## 2023-01-25 RX ORDER — LEVETIRACETAM 1000 MG/1
1000 TABLET ORAL 2 TIMES DAILY
Qty: 60 TABLET | Refills: 11 | Status: SHIPPED | OUTPATIENT
Start: 2023-01-25

## 2023-01-25 RX ORDER — ZONISAMIDE 100 MG/1
300 CAPSULE ORAL
Qty: 90 CAPSULE | Refills: 11 | Status: SHIPPED | OUTPATIENT
Start: 2023-01-25

## 2023-01-25 NOTE — PROGRESS NOTES
Neuro Office Visit      Encounter Date: 2023   Patient Name: Mayito Bassett  : 1970   MRN: [id number]   PCP Dr Chi  Chief Complaint:    Chief Complaint   Patient presents with   • Seizures       History of Present Illness: Mayito Bassett is a 52 y.o. male who is here today in Neurology for seizures    Moved from [place]. Previously managed by Dr Cardarelli and Dione WELLER at     Seizures  Medication:Keppra 1000 bid, zonisamine 100 3 q hs  Compliance:no issues  Side effects:none  Last seizure:> 1 year ago  Aura:none  Triggers:none  Description:GTC seizure. Blank stare. Can be violent. Had tongue biting. Has incontinence of bladder and bowels. Has moving of arms and legs. Confused upon awakening.    Previously on Carbamezapine 200 tid  See care everywhere. EEG with generalized epilepsy. Frontal sharps.    PH  Onset . Previous Neurologist admitted him for a week. Had a seizure. Meds changed and has been seizure free since.  No history of stroke or TBI.    PMH: epilepsy, bradycardia, muscle spasms, GERD, insmonia, vit D, tob use  FH:cousin w seizure.  SH: [employer] in [facility]. Lives w sister. Has a son.  Subjective      Past Medical History:   Past Medical History:   Diagnosis Date   • Anxiety    • Arthritis    • Asthma    • Cancer (HCC)    • Chronic back pain    • Congestive heart failure (CHF) ([facility])    • Depression    • GERD (gastroesophageal reflux disease)    • Obesity (BMI 30-39.9)    • Seizures ([facility])        Past Surgical History:   Past Surgical History:   Procedure Laterality Date   • CARDIAC CATHETERIZATION Left 2022    Procedure: Left Heart Cath;  Surgeon: Wil Bustos MD;  Location: [facility] CATH INVASIVE LOCATION;  Service: Cardiology;  Laterality: Left;       Family History:   Family History   Problem Relation Age of Onset   • Heart disease Mother    • Kidney disease Mother    • Diabetes Mother    • No Known Problems Sister    • No Known  Problems Brother    • No Known Problems Maternal Grandmother    • No Known Problems Maternal Grandfather    • No Known Problems Paternal Grandmother    • No Known Problems Paternal Grandfather    • No Known Problems Daughter    • No Known Problems Son    • No Known Problems Cousin    • Rheum arthritis Neg Hx    • Osteoarthritis Neg Hx    • Asthma Neg Hx    • Heart failure Neg Hx    • Hyperlipidemia Neg Hx    • Hypertension Neg Hx    • Migraines Neg Hx    • Rashes / Skin problems Neg Hx    • Seizures Neg Hx    • Stroke Neg Hx    • Thyroid disease Neg Hx        Social History:   Social History     Socioeconomic History   • Marital status: Single   Tobacco Use   • Smoking status: Heavy Smoker     Packs/day: 1.00     Years: 37.00     Pack years: 37.00     Types: Cigarettes   • Smokeless tobacco: Never   Vaping Use   • Vaping Use: Never used   Substance and Sexual Activity   • Alcohol use: Not Currently     Comment: OCCASIONAL   • Drug use: Never     Types: Marijuana   • Sexual activity: Not Currently     Partners: Female     Birth control/protection: None       Medications:     Current Outpatient Medications:   •  aspirin 81 MG EC tablet, Take 81 mg by mouth Daily., Disp: , Rfl:   •  cyclobenzaprine (FLEXERIL) 10 MG tablet, Take 1 tablet by mouth At Night As Needed for Muscle Spasms., Disp: 30 tablet, Rfl: 5  •  famotidine (Pepcid) 20 MG tablet, Take 1 tablet by mouth 2 (Two) Times a Day. To control acid reflux and heartburn, Disp: 60 tablet, Rfl: 5  •  levETIRAcetam (KEPPRA) 1000 MG tablet, Take 1 tablet by mouth 2 (Two) Times a Day., Disp: 60 tablet, Rfl: 11  •  Multiple Vitamin (MULTI VITAMIN MENS PO), Take  by mouth., Disp: , Rfl:   •  nicotine (NICODERM CQ) 21 MG/24HR patch, Place 1 patch on the skin as directed by provider Daily., Disp: 28 each, Rfl: 2  •  traZODone (DESYREL) 100 MG tablet, Take 1 tablet by mouth Every Night. For sleep and mood, Disp: 30 tablet, Rfl: 5  •  valsartan (DIOVAN) 40 MG tablet, Take 1  tablet by mouth Daily., Disp: 30 tablet, Rfl: 6  •  zonisamide (ZONEGRAN) 100 MG capsule, Take 3 capsules by mouth every night at bedtime. For prevention of seizures, Disp: 90 capsule, Rfl: 11    Allergies:   No Known Allergies    PHQ-9 Total Score:     STEADI Fall Risk Assessment has not been completed.    Objective     Physical Exam:   Physical Exam  Vitals and nursing note reviewed.   Constitutional:       General: He is not in acute distress.     Appearance: He is well-developed.   HENT:      Head: Normocephalic and atraumatic.      Right Ear: External ear normal.      Left Ear: External ear normal.      Nose: Nose normal.   Eyes:      General: No scleral icterus.        Right eye: No discharge.         Left eye: No discharge.      Conjunctiva/sclera: Conjunctivae normal.      Pupils: Pupils are equal, round, and reactive to light.   Cardiovascular:      Rate and Rhythm: Normal rate.   Pulmonary:      Effort: Pulmonary effort is normal.   Musculoskeletal:         General: No deformity.      Cervical back: Neck supple.   Skin:     General: Skin is warm and dry.      Findings: No rash.   Neurological:      General: No focal deficit present.      Mental Status: He is alert and oriented to person, place, and time. Mental status is at baseline.      Cranial Nerves: No cranial nerve deficit.      Sensory: No sensory deficit.      Motor: No weakness or abnormal muscle tone.      Coordination: Coordination normal.      Gait: Gait normal.      Deep Tendon Reflexes: Reflexes normal.   Psychiatric:         Mood and Affect: Mood normal.         Behavior: Behavior normal.         Thought Content: Thought content normal.         Judgment: Judgment normal.         Neurologic Exam     Mental Status   Oriented to person, place, and time.     Cranial Nerves     CN III, IV, VI   Pupils are equal, round, and reactive to light.       Vital Signs:   Vitals:    01/25/23 1453   BP: 116/72   Pulse: 78   Temp: 98.2 °F (36.8 °C)   SpO2:  "98%   Weight: 114 kg (250 lb 9.6 oz)   Height: 172.7 cm (67.99\")     Body mass index is 38.11 kg/m².       Assessment / Plan      Assessment/Plan:   Diagnoses and all orders for this visit:    1. Nonintractable epilepsy without status epilepticus, unspecified epilepsy type (HCC)  Comments:  Cont Keppra 1000 bid, w zonesamide 100mg 3 q hs. Check labs. FU 1 yr sooner prn.  Orders:  -     zonisamide (ZONEGRAN) 100 MG capsule; Take 3 capsules by mouth every night at bedtime. For prevention of seizures  Dispense: 90 capsule; Refill: 11  -     levETIRAcetam (KEPPRA) 1000 MG tablet; Take 1 tablet by mouth 2 (Two) Times a Day.  Dispense: 60 tablet; Refill: 11  -     Levetiracetam Level (Keppra); Future  -     CBC Auto Differential; Future  -     Comprehensive Metabolic Panel; Future           Patient Education:   I have instructed and given the patient education on the importance of not driving and operating heavy machinery. No solo bathing or tub baths for 3 months from onset of the most recent seizure.   Minimize stress as much as possible. I have recommended 7-8 hours of sleep each night. Abstain from alcohol intake. Educated on Antiepileptic medications with possible side effects and signs and symptoms to report if prescribed during visit. Instructed to take seizure medication daily if prescribed. Reviewed potential seizure risk factors.   I have instructed the patient to call 911 or our office if another seizure does occur. Patient verbalizes and understands.       Reviewed medications, potential side effects and signs and symptoms to report. Discussed risk versus benefits of treatment plan with patient and/or family-including medications, labs and radiology that may be ordered. Addressed questions and concerns during visit. Patient and/or family verbalized understanding and agree with plan. Instructed to call the office with any questions and report to ER with any life-threatening symptoms.     Follow Up:   Return in " about 1 year (around 1/25/2024).    During this visit the following were done:  Labs Reviewed [x]    Labs Ordered [x]    Radiology Reports Reviewed [x]    Radiology Ordered []    PCP Records Reviewed []    Referring Provider Records Reviewed []    ER Records Reviewed []    Hospital Records Reviewed [x]    History Obtained From Family []    Radiology Images Reviewed []    Other Reviewed [x]    Records Requested []      Dimitri Rivera, DNP, APRN

## 2023-01-26 ENCOUNTER — TELEPHONE (OUTPATIENT)
Dept: NEUROLOGY | Facility: CLINIC | Age: 53
End: 2023-01-26
Payer: COMMERCIAL

## 2023-01-26 LAB
BASOPHILS # BLD AUTO: 0.05 10*3/MM3 (ref 0–0.2)
BASOPHILS NFR BLD AUTO: 0.6 % (ref 0–1.5)
DEPRECATED RDW RBC AUTO: 42.6 FL (ref 37–54)
EOSINOPHIL # BLD AUTO: 0.07 10*3/MM3 (ref 0–0.4)
EOSINOPHIL NFR BLD AUTO: 0.9 % (ref 0.3–6.2)
ERYTHROCYTE [DISTWIDTH] IN BLOOD BY AUTOMATED COUNT: 12.4 % (ref 12.3–15.4)
HCT VFR BLD AUTO: 42.5 % (ref 37.5–51)
HGB BLD-MCNC: 15.3 G/DL (ref 13–17.7)
IMM GRANULOCYTES # BLD AUTO: 0.03 10*3/MM3 (ref 0–0.05)
IMM GRANULOCYTES NFR BLD AUTO: 0.4 % (ref 0–0.5)
LYMPHOCYTES # BLD AUTO: 2.32 10*3/MM3 (ref 0.7–3.1)
LYMPHOCYTES NFR BLD AUTO: 29.3 % (ref 19.6–45.3)
MCH RBC QN AUTO: 34 PG (ref 26.6–33)
MCHC RBC AUTO-ENTMCNC: 36 G/DL (ref 31.5–35.7)
MCV RBC AUTO: 94.4 FL (ref 79–97)
MONOCYTES # BLD AUTO: 0.42 10*3/MM3 (ref 0.1–0.9)
MONOCYTES NFR BLD AUTO: 5.3 % (ref 5–12)
NEUTROPHILS NFR BLD AUTO: 5.04 10*3/MM3 (ref 1.7–7)
NEUTROPHILS NFR BLD AUTO: 63.5 % (ref 42.7–76)
NRBC BLD AUTO-RTO: 0 /100 WBC (ref 0–0.2)
PLATELET # BLD AUTO: 159 10*3/MM3 (ref 140–450)
PMV BLD AUTO: 10.9 FL (ref 6–12)
RBC # BLD AUTO: 4.5 10*6/MM3 (ref 4.14–5.8)
WBC NRBC COR # BLD: 7.93 10*3/MM3 (ref 3.4–10.8)

## 2023-01-26 NOTE — TELEPHONE ENCOUNTER
Caller: Mayito Bassett    Relationship: Self    Best call back number: [phone]     What is the best time to reach you: ANY    Who are you requesting to speak with (clinical staff, provider,  specific staff member): PATEL    What was the call regarding: PATIENT WOULD LIKE TO KNOW IF IT IS OK FOR HIM TO BE WORKING AS A  AS HE BURNS HIMSELF SOMETIMES LEAVING MARKS ON HIS HAND. HE STATES HE HAS SEIZURES BUT HAS NOT HAD ONE IN OVER 1 YEAR & HAS CONCERNS      PLEASE CALL & ADVISE    THANK YOU

## 2023-01-28 LAB — LEVETIRACETAM SERPL-MCNC: 19.6 UG/ML (ref 10–40)

## 2023-01-30 ENCOUNTER — TELEPHONE (OUTPATIENT)
Dept: NEUROLOGY | Facility: CLINIC | Age: 53
End: 2023-01-30
Payer: COMMERCIAL

## 2023-01-30 NOTE — TELEPHONE ENCOUNTER
Called patient and gave results.  Patient was understanding and appreciative.    ----- Message from Dimitri Rivera DNP, APRN sent at 1/30/2023 10:14 AM EST -----  Please notify pt labs are stable.

## 2023-02-20 ENCOUNTER — OFFICE VISIT (OUTPATIENT)
Dept: FAMILY MEDICINE CLINIC | Facility: CLINIC | Age: 53
End: 2023-02-20
Payer: COMMERCIAL

## 2023-02-20 VITALS
WEIGHT: 245.2 LBS | TEMPERATURE: 98.4 F | DIASTOLIC BLOOD PRESSURE: 68 MMHG | OXYGEN SATURATION: 97 % | BODY MASS INDEX: 37.16 KG/M2 | HEIGHT: 68 IN | HEART RATE: 73 BPM | SYSTOLIC BLOOD PRESSURE: 110 MMHG

## 2023-02-20 DIAGNOSIS — Z72.0 TOBACCO ABUSE: ICD-10-CM

## 2023-02-20 DIAGNOSIS — G47.00 INSOMNIA, UNSPECIFIED TYPE: Primary | ICD-10-CM

## 2023-02-20 DIAGNOSIS — Z00.00 HEALTHCARE MAINTENANCE: ICD-10-CM

## 2023-02-20 DIAGNOSIS — M54.50 CHRONIC RIGHT-SIDED LOW BACK PAIN, UNSPECIFIED WHETHER SCIATICA PRESENT: ICD-10-CM

## 2023-02-20 DIAGNOSIS — G89.29 CHRONIC RIGHT-SIDED LOW BACK PAIN, UNSPECIFIED WHETHER SCIATICA PRESENT: ICD-10-CM

## 2023-02-20 PROCEDURE — 91305 COVID-19 (PFIZER) 12+ YRS: CPT | Performed by: STUDENT IN AN ORGANIZED HEALTH CARE EDUCATION/TRAINING PROGRAM

## 2023-02-20 PROCEDURE — 0052A COVID-19 (PFIZER) 12+ YRS: CPT | Performed by: STUDENT IN AN ORGANIZED HEALTH CARE EDUCATION/TRAINING PROGRAM

## 2023-02-20 PROCEDURE — 99214 OFFICE O/P EST MOD 30 MIN: CPT | Performed by: STUDENT IN AN ORGANIZED HEALTH CARE EDUCATION/TRAINING PROGRAM

## 2023-02-20 RX ORDER — NICOTINE 21 MG/24HR
1 PATCH, TRANSDERMAL 24 HOURS TRANSDERMAL EVERY 24 HOURS
Qty: 28 EACH | Refills: 2 | Status: SHIPPED | OUTPATIENT
Start: 2023-02-20

## 2023-02-20 RX ORDER — MELOXICAM 7.5 MG/1
15 TABLET ORAL DAILY
Qty: 30 TABLET | Refills: 0 | Status: SHIPPED | OUTPATIENT
Start: 2023-02-20 | End: 2023-02-20

## 2023-02-20 RX ORDER — MELOXICAM 15 MG/1
15 TABLET ORAL DAILY
Qty: 30 TABLET | Refills: 0 | Status: SHIPPED | OUTPATIENT
Start: 2023-02-20

## 2023-02-20 NOTE — PROGRESS NOTES
Office Note     Name: Mayito Bassett    : 1970     MRN: [id number]     Chief Complaint  Insomnia (12 wk follow-up )    Subjective     History of Present Illness:  Mayito Bassett is a 52 y.o. male who presents today for multiple concerns.    Insomnia: Patient is currently on trazodone 100 mg nightly and is doing well.    Tobacco dependence: Patient is currently smoking 1 pack/day.  He ran out of patches and he was told by his pharmacy that he no longer has refills.  He would like refills on this today.    Left knee pain.  Patient ended up falling at work due to the floor being recently and injured his left knee.  He went to the [facility] ER and x-rays were unremarkable.  He continues to have symptoms.  He reports that his pain is an achy kind of pain and feels that there is something deep inside that is wrong.  He takes a muscle relaxer as needed but is not on any other pain medicine.  He does have an appointment with an orthopedic surgeon early next month and wants them to check it out.     Chronic back pain: At his last visit, patient had an x-ray which showed some degenerative changes in his spine that have slightly progressed from the past.  Reports that the steroids have helped but when he is working, he does intermittently get some back pain.  He has tried diclofenac which has not helped and then was given a short course of tramadol which helped.  He is currently not using any pain medicine.  He was supposed to follow-up with physical therapy but he reports that no one has ever called him.  The pain is located in his right lower back and radiates from time to time in his right hip.          Objective     Past Medical History:   Diagnosis Date   • Anxiety    • Arthritis    • Asthma    • Cancer ([facility])    • Chronic back pain    • Congestive heart failure (CHF) ([facility])    • Depression    • GERD (gastroesophageal reflux disease)    • Obesity (BMI 30-39.9)    • Seizures ([facility])      Past  "Surgical History:   Procedure Laterality Date   • CARDIAC CATHETERIZATION Left 12/6/2022    Procedure: Left Heart Cath;  Surgeon: Wil Bustos MD;  Location: [facility] CATH INVASIVE LOCATION;  Service: Cardiology;  Laterality: Left;     Family History   Problem Relation Age of Onset   • Heart disease Mother    • Kidney disease Mother    • Diabetes Mother    • No Known Problems Sister    • No Known Problems Brother    • No Known Problems Maternal Grandmother    • No Known Problems Maternal Grandfather    • No Known Problems Paternal Grandmother    • No Known Problems Paternal Grandfather    • No Known Problems Daughter    • No Known Problems Son    • No Known Problems Cousin    • Rheum arthritis Neg Hx    • Osteoarthritis Neg Hx    • Asthma Neg Hx    • Heart failure Neg Hx    • Hyperlipidemia Neg Hx    • Hypertension Neg Hx    • Migraines Neg Hx    • Rashes / Skin problems Neg Hx    • Seizures Neg Hx    • Stroke Neg Hx    • Thyroid disease Neg Hx        Vital Signs  /68 (BP Location: Left arm, Patient Position: Sitting, Cuff Size: Adult)   Pulse 73   Temp 98.4 °F (36.9 °C) (Infrared)   Ht 172.7 cm (67.99\")   Wt 111 kg (245 lb 3.2 oz)   SpO2 97% Comment: resting room air  BMI 37.29 kg/m²   Estimated body mass index is 37.29 kg/m² as calculated from the following:    Height as of this encounter: 172.7 cm (67.99\").    Weight as of this encounter: 111 kg (245 lb 3.2 oz).    Physical Exam  Vitals reviewed.   Constitutional:       Appearance: Normal appearance.   HENT:      Nose: Nose normal.   Cardiovascular:      Rate and Rhythm: Normal rate and regular rhythm.   Pulmonary:      Effort: Pulmonary effort is normal.   Abdominal:      General: Abdomen is flat.      Palpations: Abdomen is soft.   Musculoskeletal:      Comments: Moving all extremities spontaneously   Skin:     General: Skin is warm and dry.   Neurological:      Mental Status: He is alert.      Comments: Alert and oriented   Psychiatric:         " Mood and Affect: Mood normal.         Behavior: Behavior normal.               Assessment and Plan     Diagnoses and all orders for this visit:    1. Insomnia, unspecified type (Primary)  Assessment & Plan:  - controlled  - continue trazodone      2. Tobacco abuse  Assessment & Plan:  - Patient with heavy smoking history, currently down to 1 pack/day  - We will continue patches, refill ordered    Orders:  -     nicotine (NICODERM CQ) 21 MG/24HR patch; Place 1 patch on the skin as directed by provider Daily.  Dispense: 28 each; Refill: 2    3. Chronic right-sided low back pain, unspecified whether sciatica present  Assessment & Plan:  - Patient with low back pain for quite some time with pain originating in the right side of his low back and radiating right hip  - Has trialed diclofenac and it did not help  - X-ray showed degenerative changes  - Patient is status post Medrol Dosepak and tramadol which helped somewhat   - We will place new referral for physical therapy and start a course of meloxicam to be used as needed-patient reports that his pain is not very severe at this time     Orders:  -     meloxicam (Mobic) 15 MG tablet; Take 1 tablet by mouth Daily.  Dispense: 30 tablet; Refill: 0  -     Ambulatory Referral to Physical Therapy Evaluate and treat    4. Healthcare maintenance  Assessment & Plan:  - Due for second COVID vaccine    Orders:  -     COVID-19 Vaccine (Pfizer) Gray Cap Primary Series    Other orders  -     Discontinue: meloxicam (Mobic) 7.5 MG tablet; Take 2 tablets by mouth Daily.  Dispense: 30 tablet; Refill: 0      Smoking Cessation:   less than 3 minutes spent counseling Agreeable to stop    Follow Up  Return in about 4 weeks (around 3/20/2023) for back pain.    Irene Chi MD

## 2023-02-21 PROBLEM — G89.29 CHRONIC RIGHT-SIDED LOW BACK PAIN: Status: ACTIVE | Noted: 2023-02-21

## 2023-02-21 PROBLEM — Z00.00 HEALTHCARE MAINTENANCE: Status: ACTIVE | Noted: 2023-02-21

## 2023-02-21 PROBLEM — M54.50 CHRONIC RIGHT-SIDED LOW BACK PAIN: Status: ACTIVE | Noted: 2023-02-21

## 2023-02-21 NOTE — ASSESSMENT & PLAN NOTE
- Patient with low back pain for quite some time with pain originating in the right side of his low back and radiating right hip  - Has trialed diclofenac and it did not help  - X-ray showed degenerative changes  - Patient is status post Medrol Dosepak and tramadol which helped somewhat   - We will place new referral for physical therapy and start a course of meloxicam to be used as needed-patient reports that his pain is not very severe at this time

## 2023-02-21 NOTE — ASSESSMENT & PLAN NOTE
- Patient with heavy smoking history, currently down to 1 pack/day  - We will continue patches, refill ordered

## 2023-03-14 ENCOUNTER — OFFICE VISIT (OUTPATIENT)
Dept: CARDIOLOGY | Facility: CLINIC | Age: 53
End: 2023-03-14
Payer: COMMERCIAL

## 2023-03-14 VITALS
SYSTOLIC BLOOD PRESSURE: 106 MMHG | WEIGHT: 247 LBS | HEART RATE: 64 BPM | OXYGEN SATURATION: 98 % | DIASTOLIC BLOOD PRESSURE: 74 MMHG | BODY MASS INDEX: 37.44 KG/M2 | HEIGHT: 68 IN

## 2023-03-14 DIAGNOSIS — I50.22 CHRONIC SYSTOLIC (CONGESTIVE) HEART FAILURE: Primary | ICD-10-CM

## 2023-03-14 PROCEDURE — 99214 OFFICE O/P EST MOD 30 MIN: CPT | Performed by: INTERNAL MEDICINE

## 2023-03-14 RX ORDER — SPIRONOLACTONE 25 MG/1
25 TABLET ORAL DAILY
Qty: 30 TABLET | Refills: 11 | Status: SHIPPED | OUTPATIENT
Start: 2023-03-14

## 2023-03-14 NOTE — PROGRESS NOTES
[facility] Cardiology  Follow Up Visit  Mayito Bassett  1970    VISIT DATE:  03/14/23    PCP:   Irene Chi MD  [address] [place] [address]          CC:  Cardiomyopathy, dilated (HCC)      Problem List:    1.  Systolic dysfunction  2.  Bradycardia  3.  Tobacco abuse  4.  Epilepsy  5.  Incidental coronary cameral fistula     Holter monitor July 2022: Benign study with short runs of PAT, 10 beats of accelerated junctional rhythm.  Avg HR 50s, no AV block     Stress echo August 2020: EF 41 to 45%, no evidence of ischemia, normal augmentation to dobutamine,  No significant coronary calcification on CT chest    Cardiac catheterization December 2022  • Mild nonobstructive CAD, normal LAD and codominant circumflex, mid RCA 20% scattered plaque  • Incidental finding of an aberrant conus branch that appears to supply a cameral fistula.  Unlikely of clinical significance.  • LVEDP 7 mmHg         History of Present Illness:  Mayito Bassett  Is a 52 y.o. male with pertinent cardiac history detailed above.  Patient states he is doing fairly well.  Does note some mild bilateral lower extremity edema.  Also complains of some pain behind his left knee.  He is following with orthopedics for this.  No complaints of chest pain.  His heart rate today is less bradycardic.  He has follow-up with his PCP next week.  Today discussed adding spironolactone with follow-up labs next week.  He is agreeable.  Does not routinely check heart rate or blood pressure at home.  He has made some progress in cutting down smoking down to 1/2 pack/day      Patient Active Problem List    Diagnosis Date Noted   • Healthcare maintenance 02/21/2023   • Chronic right-sided low back pain 02/21/2023   • Cardiomyopathy, dilated (HCC) 11/29/2022     Note Last Updated: 11/29/2022     Added automatically from request for surgery [id number]     • Nonintractable epilepsy without status epilepticus (HCC) 11/11/2022    • Chronic bilateral low back pain without sciatica 11/11/2022   • Encounter for screening for lung cancer 10/16/2022   • Annual physical exam 10/15/2022   • Vitamin D deficiency 10/15/2022   • Onychomycosis 10/15/2022   • Gastroesophageal reflux disease 10/15/2022   • Muscle spasms of lower extremity 10/15/2022   • Insomnia 10/15/2022   • Low HDL (under 40) 09/13/2022   • Obesity (BMI 30-39.9) 09/13/2022   • Dorsalgia 09/13/2022   • Seizure disorder ([facility]) 09/13/2022   • Chronic systolic (congestive) heart failure ([facility]) 09/13/2022   • Bradycardia, sinus 07/27/2022   • EKG abnormalities 07/27/2022   • Tobacco abuse 07/27/2022   • 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 ([facility]) 07/27/2022   • Morbid obesity with body mass index (BMI) of 40.0 or higher ([facility]) 02/09/2022   • Upper back pain 11/15/2019   • Seizure disorder ([facility]) 09/06/2019   • Seizure ([facility]) 07/15/2015       No Known Allergies    Social History     Socioeconomic History   • Marital status: Single   Tobacco Use   • Smoking status: Every Day     Packs/day: 0.50     Years: 37.00     Pack years: 18.50     Types: Cigarettes   • Smokeless tobacco: Never   Vaping Use   • Vaping Use: Never used   Substance and Sexual Activity   • Alcohol use: Yes     Comment: OCCASIONAL   • Drug use: Never     Types: Marijuana   • Sexual activity: Not Currently     Partners: Female     Birth control/protection: None       Family History   Problem Relation Age of Onset   • Heart disease Mother    • Kidney disease Mother    • Diabetes Mother    • No Known Problems Sister    • No Known Problems Brother    • No Known Problems Maternal Grandmother    • No Known Problems Maternal Grandfather    • No Known Problems Paternal Grandmother    • No Known Problems Paternal Grandfather    • No Known Problems Daughter    • No Known Problems Son    • No Known Problems Cousin    • Rheum arthritis Neg Hx    • Osteoarthritis Neg Hx    • Asthma  "Neg Hx    • Heart failure Neg Hx    • Hyperlipidemia Neg Hx    • Hypertension Neg Hx    • Migraines Neg Hx    • Rashes / Skin problems Neg Hx    • Seizures Neg Hx    • Stroke Neg Hx    • Thyroid disease Neg Hx        Current Medications:    Current Outpatient Medications:   •  aspirin 81 MG EC tablet, Take 1 tablet by mouth Daily., Disp: , Rfl:   •  cyclobenzaprine (FLEXERIL) 10 MG tablet, Take 1 tablet by mouth At Night As Needed for Muscle Spasms., Disp: 30 tablet, Rfl: 5  •  famotidine (Pepcid) 20 MG tablet, Take 1 tablet by mouth 2 (Two) Times a Day. To control acid reflux and heartburn, Disp: 60 tablet, Rfl: 5  •  levETIRAcetam (KEPPRA) 1000 MG tablet, Take 1 tablet by mouth 2 (Two) Times a Day., Disp: 60 tablet, Rfl: 11  •  meloxicam (Mobic) 15 MG tablet, Take 1 tablet by mouth Daily., Disp: 30 tablet, Rfl: 0  •  Multiple Vitamin (MULTI VITAMIN MENS PO), Take  by mouth., Disp: , Rfl:   •  nicotine (NICODERM CQ) 21 MG/24HR patch, Place 1 patch on the skin as directed by provider Daily., Disp: 28 each, Rfl: 2  •  traZODone (DESYREL) 100 MG tablet, Take 1 tablet by mouth Every Night. For sleep and mood, Disp: 30 tablet, Rfl: 5  •  valsartan (DIOVAN) 40 MG tablet, Take 1 tablet by mouth Daily., Disp: 30 tablet, Rfl: 6  •  zonisamide (ZONEGRAN) 100 MG capsule, Take 3 capsules by mouth every night at bedtime. For prevention of seizures, Disp: 90 capsule, Rfl: 11  •  spironolactone (ALDACTONE) 25 MG tablet, Take 1 tablet by mouth Daily., Disp: 30 tablet, Rfl: 11     Review of Systems   Cardiovascular: Positive for leg swelling. Negative for chest pain, dyspnea on exertion and palpitations.   Musculoskeletal: Positive for arthritis (Left wrist left knee).       Vitals:    03/14/23 1435   BP: 106/74   BP Location: Left arm   Patient Position: Sitting   Pulse: 64   SpO2: 98%   Weight: 112 kg (247 lb)   Height: 172.7 cm (68\")       Physical Exam  Constitutional:       Appearance: Normal appearance.   Cardiovascular:      " Rate and Rhythm: Normal rate and regular rhythm.      Pulses: Normal pulses.   Pulmonary:      Effort: Pulmonary effort is normal.      Breath sounds: Normal breath sounds.   Abdominal:      Palpations: Abdomen is soft.   Musculoskeletal:      Right lower leg: Edema present.      Left lower leg: Edema present.      Comments: Trace lower extremity edema   Neurological:      Mental Status: He is alert.         Diagnostic Data:  Procedures  Lab Results   Component Value Date    TRIG 82 12/06/2022    HDL 39 (L) 12/06/2022     Lab Results   Component Value Date    GLUCOSE 101 (H) 01/25/2023    BUN 12 01/25/2023    CREATININE 0.99 01/25/2023     01/25/2023    K 4.1 01/25/2023     01/25/2023    CO2 25.2 01/25/2023     Lab Results   Component Value Date    HGBA1C 5.20 07/27/2022     Lab Results   Component Value Date    WBC 7.93 01/25/2023    HGB 15.3 01/25/2023    HCT 42.5 01/25/2023     01/25/2023       Assessment:   Diagnosis Plan   1. Chronic systolic (congestive) heart failure (HCC)  Basic Metabolic Panel    proBNP          Plan:    1.  Systolic dysfunction  -Stress test showed reduced resting EF 45% visually appears lower  - cardiac catheterization with no obstructive CAD.  Incidentally noted coronary-cameral fistula normal LVEDP   -Continue aspirin 81 mg daily  -Valsartan 40 mg  -sprionolactone 25mg added, will repeat labs next week    2.  Sinus bradycardia  -Heart rates in the 60s today.  If remaining stable add beta blocker next visit    3.  Tobacco use  has nicotine patches.  down to 1/2 ppd  -provided encourgement        Juan Francisco Morin MD [facility]

## 2023-03-20 ENCOUNTER — OFFICE VISIT (OUTPATIENT)
Dept: FAMILY MEDICINE CLINIC | Facility: CLINIC | Age: 53
End: 2023-03-20
Payer: COMMERCIAL

## 2023-03-20 VITALS
HEART RATE: 75 BPM | RESPIRATION RATE: 21 BRPM | OXYGEN SATURATION: 100 % | DIASTOLIC BLOOD PRESSURE: 74 MMHG | TEMPERATURE: 98.9 F | HEIGHT: 68 IN | SYSTOLIC BLOOD PRESSURE: 122 MMHG | BODY MASS INDEX: 36.89 KG/M2 | WEIGHT: 243.4 LBS

## 2023-03-20 DIAGNOSIS — G89.29 CHRONIC RIGHT-SIDED LOW BACK PAIN, UNSPECIFIED WHETHER SCIATICA PRESENT: ICD-10-CM

## 2023-03-20 DIAGNOSIS — M54.50 CHRONIC RIGHT-SIDED LOW BACK PAIN, UNSPECIFIED WHETHER SCIATICA PRESENT: ICD-10-CM

## 2023-03-20 DIAGNOSIS — Z01.818 PREOPERATIVE CLEARANCE: Primary | ICD-10-CM

## 2023-03-20 PROCEDURE — 99214 OFFICE O/P EST MOD 30 MIN: CPT | Performed by: STUDENT IN AN ORGANIZED HEALTH CARE EDUCATION/TRAINING PROGRAM

## 2023-03-20 NOTE — ASSESSMENT & PLAN NOTE
- Patient will be getting a left ganglion wrist cyst removed by orthopedic surgery and needs a medical clearance  - Patient has not had any seizures recently, blood pressures have been within normal limits and he is compliant with his medicines  - This is a low risk procedure and patient has an RCRI score of 1 with a 6% 30-day risk of death, MI or cardiac arrest  - Based on today's exam, I do not see any reason why patient cannot get his surgery done, but patient will also be getting cardiac clearance from his cardiologist  - Did sign medical clearance form today

## 2023-03-20 NOTE — ASSESSMENT & PLAN NOTE
- Patient with low back pain for quite some time with pain originating in the right side of his low back and radiating right hip, intermittently  - X-ray showed degenerative changes  - Patient declines physical therapy or further intervention at this time  - Can continue meloxicam as needed

## 2023-03-20 NOTE — PROGRESS NOTES
Office Note     Name: Mayito Bassett    : 1970     MRN: [id number]     Chief Complaint  Back Pain (1m Follow up )    Subjective     History of Present Illness:  Mayito Bassett is a 52 y.o. male who presents today for follow-up on back pain as well as to discuss a few other issues.    Back pain: Patient has been using meloxicam as needed.  He has not seen physical therapy.  He reports that the back pain is really not bothersome to him right now because he is more focused on the pain in his wrist and the pain in his left knee.    Ganglion cyst on left wrist: Patient is following with [facility].  They think that he needs to have surgery for removal of that ganglion cyst.  He reports that that cyst is sometimes painful and he would like it removed.  He would like clearance for surgery.  He also will get clearance from his cardiologist.  He has a form for me to sign today.    Left knee pain: Patient recently fell at work and injured his left knee.  Reports that he has had chronic pain in that knee previously.  He has been following with his orthopedic surgeon who gave him a steroid shot in that knee and is feeling better overall but the medicine seems to be wearing off.  He will continue to discuss the knee pain with his surgeon.        Objective     Past Medical History:   Diagnosis Date   • Anxiety    • Arthritis    • Asthma    • Cancer (HCC)    • Chronic back pain    • Congestive heart failure (CHF) (HCC)    • Depression    • GERD (gastroesophageal reflux disease)    • Obesity (BMI 30-39.9)    • Seizures (HCC)      Past Surgical History:   Procedure Laterality Date   • CARDIAC CATHETERIZATION Left 2022    Procedure: Left Heart Cath;  Surgeon: Wil Bustos MD;  Location: [facility] CATH INVASIVE LOCATION;  Service: Cardiology;  Laterality: Left;   • CARDIAC CATHETERIZATION  2022     Family History   Problem Relation Age of Onset   • Heart disease Mother    •  "Kidney disease Mother    • Diabetes Mother    • No Known Problems Sister    • No Known Problems Brother    • No Known Problems Maternal Grandmother    • No Known Problems Maternal Grandfather    • No Known Problems Paternal Grandmother    • No Known Problems Paternal Grandfather    • No Known Problems Daughter    • No Known Problems Son    • No Known Problems Cousin    • Rheum arthritis Neg Hx    • Osteoarthritis Neg Hx    • Asthma Neg Hx    • Heart failure Neg Hx    • Hyperlipidemia Neg Hx    • Hypertension Neg Hx    • Migraines Neg Hx    • Rashes / Skin problems Neg Hx    • Seizures Neg Hx    • Stroke Neg Hx    • Thyroid disease Neg Hx        Vital Signs  /74   Pulse 75   Temp 98.9 °F (37.2 °C) (Infrared)   Resp 21   Ht 172.7 cm (68\")   Wt 110 kg (243 lb 6.4 oz)   SpO2 100%   BMI 37.01 kg/m²   Estimated body mass index is 37.01 kg/m² as calculated from the following:    Height as of this encounter: 172.7 cm (68\").    Weight as of this encounter: 110 kg (243 lb 6.4 oz).    Physical Exam  Vitals reviewed.   Constitutional:       Appearance: Normal appearance.   Cardiovascular:      Rate and Rhythm: Normal rate and regular rhythm.   Pulmonary:      Effort: Pulmonary effort is normal.   Abdominal:      General: Abdomen is flat.      Palpations: Abdomen is soft.   Musculoskeletal:      Comments: Moving all extremities spontaneously   Skin:     General: Skin is warm and dry.   Neurological:      Mental Status: He is alert.      Comments: Alert and oriented   Psychiatric:         Mood and Affect: Mood normal.         Behavior: Behavior normal.               Assessment and Plan     Diagnoses and all orders for this visit:    1. Preoperative clearance (Primary)  Assessment & Plan:  - Patient will be getting a left ganglion wrist cyst removed by orthopedic surgery and needs a medical clearance  - Patient has not had any seizures recently, blood pressures have been within normal limits and he is compliant with " his medicines  - This is a low risk procedure and patient has an RCRI score of 1 with a 6% 30-day risk of death, MI or cardiac arrest  - Based on today's exam, I do not see any reason why patient cannot get his surgery done, but patient will also be getting cardiac clearance from his cardiologist  - Did sign medical clearance form today      2. Chronic right-sided low back pain, unspecified whether sciatica present  Assessment & Plan:  - Patient with low back pain for quite some time with pain originating in the right side of his low back and radiating right hip, intermittently  - X-ray showed degenerative changes  - Patient declines physical therapy or further intervention at this time  - Can continue meloxicam as needed          Follow Up  Return in about 7 months (around 10/20/2023) for Annual.    Irene Chi MD

## 2023-04-10 ENCOUNTER — LAB (OUTPATIENT)
Dept: LAB | Facility: HOSPITAL | Age: 53
End: 2023-04-10
Payer: COMMERCIAL

## 2023-04-10 DIAGNOSIS — I50.22 CHRONIC SYSTOLIC (CONGESTIVE) HEART FAILURE: ICD-10-CM

## 2023-04-10 LAB
ANION GAP SERPL CALCULATED.3IONS-SCNC: 9 MMOL/L (ref 5–15)
BUN SERPL-MCNC: 16 MG/DL (ref 6–20)
BUN/CREAT SERPL: 16.5 (ref 7–25)
CALCIUM SPEC-SCNC: 9.2 MG/DL (ref 8.6–10.5)
CHLORIDE SERPL-SCNC: 105 MMOL/L (ref 98–107)
CO2 SERPL-SCNC: 26 MMOL/L (ref 22–29)
CREAT SERPL-MCNC: 0.97 MG/DL (ref 0.76–1.27)
EGFRCR SERPLBLD CKD-EPI 2021: 93.3 ML/MIN/1.73
GLUCOSE SERPL-MCNC: 120 MG/DL (ref 65–99)
POTASSIUM SERPL-SCNC: 3.6 MMOL/L (ref 3.5–5.2)
SODIUM SERPL-SCNC: 140 MMOL/L (ref 136–145)

## 2023-04-10 PROCEDURE — 36415 COLL VENOUS BLD VENIPUNCTURE: CPT

## 2023-04-10 PROCEDURE — 80048 BASIC METABOLIC PNL TOTAL CA: CPT

## 2023-04-18 PROCEDURE — 93010 ELECTROCARDIOGRAM REPORT: CPT | Performed by: INTERNAL MEDICINE

## 2023-04-19 ENCOUNTER — OUTSIDE FACILITY SERVICE (OUTPATIENT)
Dept: CARDIOLOGY | Facility: CLINIC | Age: 53
End: 2023-04-19
Payer: COMMERCIAL

## 2023-06-11 DIAGNOSIS — K21.9 GASTROESOPHAGEAL REFLUX DISEASE, UNSPECIFIED WHETHER ESOPHAGITIS PRESENT: ICD-10-CM

## 2023-06-12 RX ORDER — FAMOTIDINE 20 MG/1
TABLET, FILM COATED ORAL
Qty: 60 TABLET | Refills: 0 | Status: SHIPPED | OUTPATIENT
Start: 2023-06-12

## 2023-06-19 DIAGNOSIS — G25.5 MUSCLE, JERKY MOVEMENTS (UNCONTROLLED): ICD-10-CM

## 2023-06-19 DIAGNOSIS — K21.9 GASTROESOPHAGEAL REFLUX DISEASE, UNSPECIFIED WHETHER ESOPHAGITIS PRESENT: ICD-10-CM

## 2023-06-19 DIAGNOSIS — R53.83 FATIGUE, UNSPECIFIED TYPE: ICD-10-CM

## 2023-06-19 RX ORDER — CYCLOBENZAPRINE HCL 10 MG
10 TABLET ORAL NIGHTLY PRN
Qty: 30 TABLET | Refills: 5 | Status: SHIPPED | OUTPATIENT
Start: 2023-06-19

## 2023-06-19 RX ORDER — VALSARTAN 40 MG/1
40 TABLET ORAL DAILY
Qty: 90 TABLET | Refills: 3 | Status: SHIPPED | OUTPATIENT
Start: 2023-06-19

## 2023-06-19 RX ORDER — FAMOTIDINE 20 MG/1
TABLET, FILM COATED ORAL
Qty: 60 TABLET | Refills: 0 | OUTPATIENT
Start: 2023-06-19

## 2023-06-19 NOTE — TELEPHONE ENCOUNTER
Rx Refill Note  Requested Prescriptions     Pending Prescriptions Disp Refills   • famotidine (PEPCID) 20 MG tablet 60 tablet 0   • cyclobenzaprine (FLEXERIL) 10 MG tablet 30 tablet 5     Sig: Take 1 tablet by mouth At Night As Needed for Muscle Spasms.      Last office visit with prescribing clinician: 3/20/2023   Last telemedicine visit with prescribing clinician: Visit date not found   Next office visit with prescribing clinician: 10/20/2023                         Would you like a call back once the refill request has been completed: [] Yes [] No    If the office needs to give you a call back, can they leave a voicemail: [] Yes [] No    Dana Iglesias MA  06/19/23, 11:41 EDT

## 2023-06-19 NOTE — TELEPHONE ENCOUNTER
Caller: Mayito Bassett    Relationship: Self    Best call back number: [phone]     Requested Prescriptions:   Requested Prescriptions     Pending Prescriptions Disp Refills    famotidine (PEPCID) 20 MG tablet 60 tablet 0    cyclobenzaprine (FLEXERIL) 10 MG tablet 30 tablet 5     Sig: Take 1 tablet by mouth At Night As Needed for Muscle Spasms.        Pharmacy where request should be sent: [address] [phone] [facility] [phone] [facility]     Last office visit with prescribing clinician: 3/20/2023   Last telemedicine visit with prescribing clinician: Visit date not found   Next office visit with prescribing clinician: 10/20/2023     Additional details provided by patient:     Does the patient have less than a 3 day supply:  [] Yes  [x] No    Would you like a call back once the refill request has been completed: [] Yes [x] No    If the office needs to give you a call back, can they leave a voicemail: [] Yes [x] No    Hunter Gray   06/19/23 11:28 EDT

## 2023-08-05 DIAGNOSIS — G47.00 INSOMNIA, UNSPECIFIED TYPE: ICD-10-CM

## 2023-08-05 DIAGNOSIS — R53.83 FATIGUE, UNSPECIFIED TYPE: ICD-10-CM

## 2023-08-05 DIAGNOSIS — F32.1 MODERATE MAJOR DEPRESSION: ICD-10-CM

## 2023-08-05 RX ORDER — TRAZODONE HYDROCHLORIDE 100 MG/1
TABLET ORAL
Qty: 90 TABLET | Refills: 0 | Status: SHIPPED | OUTPATIENT
Start: 2023-08-05

## 2023-08-28 DIAGNOSIS — K21.9 GASTROESOPHAGEAL REFLUX DISEASE, UNSPECIFIED WHETHER ESOPHAGITIS PRESENT: ICD-10-CM

## 2023-08-28 RX ORDER — FAMOTIDINE 20 MG/1
TABLET, FILM COATED ORAL
Qty: 60 TABLET | Refills: 2 | Status: SHIPPED | OUTPATIENT
Start: 2023-08-28

## 2023-09-06 ENCOUNTER — OFFICE VISIT (OUTPATIENT)
Dept: FAMILY MEDICINE CLINIC | Facility: CLINIC | Age: 53
End: 2023-09-06
Payer: COMMERCIAL

## 2023-09-06 VITALS
BODY MASS INDEX: 35.58 KG/M2 | TEMPERATURE: 98.6 F | HEART RATE: 54 BPM | SYSTOLIC BLOOD PRESSURE: 122 MMHG | WEIGHT: 234.8 LBS | DIASTOLIC BLOOD PRESSURE: 74 MMHG | HEIGHT: 68 IN | OXYGEN SATURATION: 99 %

## 2023-09-06 DIAGNOSIS — Z23 IMMUNIZATION DUE: ICD-10-CM

## 2023-09-06 DIAGNOSIS — K59.00 CONSTIPATION, UNSPECIFIED CONSTIPATION TYPE: Primary | ICD-10-CM

## 2023-09-06 DIAGNOSIS — G47.00 INSOMNIA, UNSPECIFIED TYPE: ICD-10-CM

## 2023-09-06 DIAGNOSIS — K21.9 GASTROESOPHAGEAL REFLUX DISEASE, UNSPECIFIED WHETHER ESOPHAGITIS PRESENT: ICD-10-CM

## 2023-09-06 RX ORDER — POLYETHYLENE GLYCOL 3350 17 G/17G
17 POWDER, FOR SOLUTION ORAL DAILY
Qty: 510 G | Refills: 0 | Status: SHIPPED | OUTPATIENT
Start: 2023-09-06

## 2023-09-06 RX ORDER — AMOXICILLIN 250 MG
1 CAPSULE ORAL 2 TIMES DAILY PRN
Qty: 60 TABLET | Refills: 0 | Status: SHIPPED | OUTPATIENT
Start: 2023-09-06

## 2023-09-06 NOTE — PROGRESS NOTES
Office Note     Name: Mayito Bassett    : 1970     MRN: [id number]     Chief Complaint  Constipation (Pt has tried some over the counter stool softener but it isnt helping. He states that he only goes about once every 4 days. )    Subjective     History of Present Illness:  Mayito Bassett is a 52 y.o. male who presents today for chronic care management.    Constipation: Patient has tried some over-the-counter stool softeners but they are not helping with his symptoms.  He reports that he goes only once every 4 days. Not sure what he tried, but does not seem to help.  He does eat a lot of greasy foods.    Insomnia: Patient is currently on trazodone 100 mg nightly and is doing well.    GERD: Patient is currently on famotidine 20 mg daily.  He reports that his symptoms are well controlled.              Objective     Past Medical History:   Diagnosis Date    Anxiety     Arthritis     Asthma     Cancer     Chronic back pain     Congestive heart failure (CHF)     Depression     GERD (gastroesophageal reflux disease)     Obesity (BMI 30-39.9)     Seizures      Past Surgical History:   Procedure Laterality Date    CARDIAC CATHETERIZATION Left 2022    Procedure: Left Heart Cath;  Surgeon: Wil Bustos MD;  Location: [facility] CATH INVASIVE LOC[facility];  Service: Cardiology;  Laterality: Left;    CARDIAC CATHETERIZATION  2022     Family History   Problem Relation Age of Onset    Heart disease Mother     Kidney disease Mother     Diabetes Mother     No Known Problems Sister     No Known Problems Brother     No Known Problems Maternal Grandmother     No Known Problems Maternal Grandfather     No Known Problems Paternal Grandmother     No Known Problems Paternal Grandfather     No Known Problems Daughter     No Known Problems Son     No Known Problems Cousin     Rheum arthritis Neg Hx     Osteoarthritis Neg Hx     Asthma Neg Hx     Heart failure Neg Hx     Hyperlipidemia Neg Hx      "Hypertension Neg Hx     Migraines Neg Hx     Rashes / Skin problems Neg Hx     Seizures Neg Hx     Stroke Neg Hx     Thyroid disease Neg Hx        Vital Signs  /74   Pulse 54   Temp 98.6 °F (37 °C) (Infrared)   Ht 172.7 cm (68\")   Wt 107 kg (234 lb 12.8 oz)   SpO2 99%   BMI 35.70 kg/m²   Estimated body mass index is 35.7 kg/m² as calculated from the following:    Height as of this encounter: 172.7 cm (68\").    Weight as of this encounter: 107 kg (234 lb 12.8 oz).    Physical Exam  Vitals reviewed.   Constitutional:       Appearance: Normal appearance.   HENT:      Nose: Nose normal.   Cardiovascular:      Rate and Rhythm: Normal rate and regular rhythm.   Pulmonary:      Effort: Pulmonary effort is normal.   Abdominal:      General: Abdomen is flat.      Palpations: Abdomen is soft.   Musculoskeletal:      Comments: Moving all extremities spontaneously   Skin:     General: Skin is warm and dry.   Neurological:      Mental Status: He is alert.      Comments: Alert and oriented   Psychiatric:         Mood and Affect: Mood normal.         Behavior: Behavior normal.                 Assessment and Plan     Diagnoses and all orders for this visit:    1. Constipation, unspecified constipation type (Primary)  Assessment & Plan:  - Suspect secondary to poor diet, discussed dietary modification and ensuring that he is drinking approximately 2 L of water per day  - We will start Doc senna twice daily as needed  - We will start MiraLAX daily thereafter  - Advised patient to return to clinic if no improvement in symptoms before his current scheduled appointment    Orders:  -     sennosides-docusate (senna-docusate sodium) 8.6-50 MG per tablet; Take 1 tablet by mouth 2 (Two) Times a Day As Needed for Constipation.  Dispense: 60 tablet; Refill: 0  -     polyethylene glycol (MIRALAX) 17 GM/SCOOP powder; Take 17 g by mouth Daily.  Dispense: 510 g; Refill: 0    2. Insomnia, unspecified type  Assessment & Plan:  - " controlled  - continue trazodone 100mg daily      3. Gastroesophageal reflux disease, unspecified whether esophagitis present  Assessment & Plan:  - controlled  - continue pepcid daily      4. Immunization due  -     Pneumococcal Conjugate Vaccine 20-Valent (PCV20)  -     Shingrix Vaccine        Follow Up  Return in about 2 months (around 11/6/2023) for Annual physical - 30 minute appt.    Irene Chi MD

## 2023-09-06 NOTE — ASSESSMENT & PLAN NOTE
- Suspect secondary to poor diet, discussed dietary modification and ensuring that he is drinking approximately 2 L of water per day  - We will start Doc senna twice daily as needed  - We will start MiraLAX daily thereafter  - Advised patient to return to clinic if no improvement in symptoms before his current scheduled appointment

## 2023-10-02 NOTE — PROGRESS NOTES
Subjective:     Encounter Date:10/10/2023      Patient ID: Mayito Bassett is a 53 y.o. single white male from [place], [place], works as a .    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  Unlikely of clinical significance.   Residual class I symptoms  Bradycardia  Holter monitor July 2022: Benign study with short runs of PAT, 10 beats of accelerated junctional rhythm, average heart rate in the 50s, no AV block  Moderate obesity  Tobacco abuse; 0.5 packs/day x 37 years, now 1 pack/day October 2023  Epilepsy, no episodes in 2+ years  GERD  Right hand tendinitis    No Known Allergies    Current Outpatient Medications   Medication Instructions    aspirin 81 mg, Oral, Daily    cyclobenzaprine (FLEXERIL) 10 mg, Oral, Nightly PRN    famotidine (PEPCID) 20 MG tablet TAKE 1 TABLET BY MOUTH TWICE DAILY TO CONTROL ACID REFLUX AND HEARTBURN    levETIRAcetam (KEPPRA) 1,000 mg, Oral, 2 Times Daily    meloxicam (MOBIC) 7.5 mg, Oral, 2 Times Daily PRN    Multiple Vitamin (MULTI VITAMIN MENS PO) Oral    polyethylene glycol (MIRALAX) 17 g, Oral, Daily    sennosides-docusate (senna-docusate sodium) 8.6-50 MG per tablet 1 tablet, Oral, 2 Times Daily PRN    spironolactone (ALDACTONE) 25 mg, Oral, Daily    traZODone (DESYREL) 100 MG tablet TAKE 1 TABLET BY MOUTH AT NIGHT FOR  SLEEP  AND  MOOD    valsartan (DIOVAN) 40 mg, Oral, Daily    zonisamide (ZONEGRAN) 300 mg, Oral, Every Night at Bedtime, For prevention  "of seizures         HISTORY OF PRESENT ILLNESS:  The patient is here for a 7-month follow-up.  He was a no-show to his May 2023 appointment.  He has 5 people living within his home and only 2 cars so transportation is sometimes an issue.  He was recently diagnosed with right hand tendinitis.  He says his hand \"locks up.\"  Whenever he checks his blood pressure at home it is normally around 120 systolic.  He denies any chest pain, shortness of breath, palpitations, presyncope, or syncope.  Occasionally he will have some lightheadedness upon standing or if he has been laying in the bed for a long time and then stands up.  He is still smoking 1 pack/day.  He is not a candidate for Chantix due to seizures.  He has not had any seizures for over 2 years but says that they are \"walking\" seizures. He has tried nicotine patches unsuccessfully in the past.  He wants to try to wean off of cigarettes on his own.    ROS   All other systems reviewed and otherwise negative.    Procedures       Objective:       Vitals:    10/10/23 1005 10/10/23 1008   BP: 112/70 108/68   BP Location: Right arm Right arm   Patient Position: Sitting Standing   Pulse: 75    SpO2: 99%    Weight: 105 kg (230 lb 9.6 oz)    Height: 172.7 cm (68\")      Body mass index is 35.06 kg/mý.    Constitutional:       Appearance: Healthy appearance. Not in distress.   Neck:      Vascular: No JVR. JVD normal.   Pulmonary:      Effort: Pulmonary effort is normal.      Breath sounds: Normal breath sounds. No wheezing. No rhonchi. No rales.   Chest:      Chest wall: Not tender to palpatation.   Cardiovascular:      PMI at left midclavicular line. Normal rate. Regular rhythm. Normal S1. Normal S2.       Murmurs: There is no murmur.      No gallop.  No click. No rub.   Pulses:     Intact distal pulses.   Edema:     Peripheral edema absent.   Abdominal:      General: Bowel sounds are normal.      Palpations: Abdomen is soft.      Tenderness: There is no abdominal " tenderness.   Musculoskeletal: Normal range of motion.         General: No tenderness. Skin:     General: Skin is warm and dry.   Neurological:      General: No focal deficit present.      Mental Status: Alert and oriented to person, place and time.           Lab Review:   Lab Results   Component Value Date    GLUCOSE 120 (H) 04/10/2023    BUN 16 04/10/2023    CREATININE 0.97 04/10/2023    EGFRIFNONA >60 07/22/2021    EGFRIFAFRI >60 07/22/2021    BCR 16.5 04/10/2023    CO2 26.0 04/10/2023    CALCIUM 9.2 04/10/2023    ALBUMIN 4.4 01/25/2023    AST 17 01/25/2023    ALT 24 01/25/2023       Lab Results   Component Value Date    WBC 7.93 01/25/2023    HGB 15.3 01/25/2023    HCT 42.5 01/25/2023    MCV 94.4 01/25/2023     01/25/2023       Lab Results   Component Value Date    HGBA1C 5.20 07/27/2022       Lab Results   Component Value Date    TSH 2.400 07/27/2022       Lab Results   Component Value Date    CHOL 138 12/06/2022    CHOL 141 07/27/2022     Lab Results   Component Value Date    TRIG 82 12/06/2022    TRIG 119 07/27/2022     Lab Results   Component Value Date    HDL 39 (L) 12/06/2022    HDL 33 (L) 07/27/2022     Lab Results   Component Value Date    LDL 83 12/06/2022    LDL 86 07/27/2022     Advance Care Planning   ACP discussion was held with the patient during this visit. Patient does not have an advance directive, declines further assistance.          Assessment:     Overall continued acceptable course with no new interim cardiopulmonary complaints with acceptable functional status. We will defer additional diagnostic or therapeutic intervention from a cardiac perspective at this time. I advised the patient of the risks of continuing to use tobacco, and I provided this patient with smoking cessation educational materials and discussed how to quit smoking and patient has expressed the willingness to quit.  Counseled patient for 3-5 minutes.  Not a candidate for Chantix due to seizures.  Patient prefers to  wean off cigarettes on his own.  May consider repeat echocardiogram after his next appointment to reassess EF but currently the patient is asymptomatic.       Diagnosis Plan   1. Chronic systolic (congestive) heart failure  No recurrent angina pectoris or CHF on current activity schedule; continue current treatment       2. Obesity (BMI 30-39.9)  Physical activity as tolerated, heart healthy diet      3. Bradycardia, sinus  Resolved      4. Tobacco abuse  Encouraged tobacco cessation             Plan:         Patient to continue current medications and close follow up with the above providers.  Tentative cardiology follow up in April 2024 with Dr. Morin Or patient may return sooner PRN.  Encouraged tobacco cessation    Electronically signed by Janice Landry, NITHIN, 10/10/23, 10:28 AM EDT.

## 2023-10-10 ENCOUNTER — OFFICE VISIT (OUTPATIENT)
Dept: CARDIOLOGY | Facility: CLINIC | Age: 53
End: 2023-10-10
Payer: COMMERCIAL

## 2023-10-10 VITALS
SYSTOLIC BLOOD PRESSURE: 108 MMHG | HEART RATE: 75 BPM | DIASTOLIC BLOOD PRESSURE: 68 MMHG | OXYGEN SATURATION: 99 % | BODY MASS INDEX: 34.95 KG/M2 | HEIGHT: 68 IN | WEIGHT: 230.6 LBS

## 2023-10-10 DIAGNOSIS — I50.22 CHRONIC SYSTOLIC (CONGESTIVE) HEART FAILURE: Primary | ICD-10-CM

## 2023-10-10 DIAGNOSIS — R00.1 BRADYCARDIA, SINUS: ICD-10-CM

## 2023-10-10 DIAGNOSIS — Z72.0 TOBACCO ABUSE: ICD-10-CM

## 2023-10-10 DIAGNOSIS — E66.9 OBESITY (BMI 30-39.9): ICD-10-CM

## 2023-10-10 RX ORDER — MELOXICAM 7.5 MG/1
7.5 TABLET ORAL 2 TIMES DAILY PRN
COMMUNITY
Start: 2023-10-08 | End: 2023-10-13

## 2023-10-13 ENCOUNTER — TELEPHONE (OUTPATIENT)
Dept: FAMILY MEDICINE CLINIC | Facility: CLINIC | Age: 53
End: 2023-10-13

## 2023-10-13 ENCOUNTER — OFFICE VISIT (OUTPATIENT)
Dept: FAMILY MEDICINE CLINIC | Facility: CLINIC | Age: 53
End: 2023-10-13
Payer: COMMERCIAL

## 2023-10-13 VITALS
DIASTOLIC BLOOD PRESSURE: 84 MMHG | WEIGHT: 224 LBS | SYSTOLIC BLOOD PRESSURE: 128 MMHG | HEART RATE: 92 BPM | BODY MASS INDEX: 33.95 KG/M2 | HEIGHT: 68 IN | TEMPERATURE: 98.6 F | OXYGEN SATURATION: 99 %

## 2023-10-13 DIAGNOSIS — Z23 IMMUNIZATION DUE: ICD-10-CM

## 2023-10-13 DIAGNOSIS — G47.00 INSOMNIA, UNSPECIFIED TYPE: ICD-10-CM

## 2023-10-13 DIAGNOSIS — M25.531 RIGHT WRIST PAIN: ICD-10-CM

## 2023-10-13 DIAGNOSIS — M79.641 HAND PAIN, RIGHT: Primary | ICD-10-CM

## 2023-10-13 RX ORDER — DICLOFENAC SODIUM 75 MG/1
75 TABLET, DELAYED RELEASE ORAL 2 TIMES DAILY
Qty: 30 TABLET | Refills: 0 | Status: SHIPPED | OUTPATIENT
Start: 2023-10-13 | End: 2023-10-27

## 2023-10-13 NOTE — PROGRESS NOTES
Office Note     Name: Mayito Bassett    : 1970     MRN: [id number]     Chief Complaint  Hospital Follow Up Visit, Insomnia (A lot going on to keep him form sleeping so he isn't taking his medication regularly/He is having memory issues now and he thinks its bc he isn't sleeping), and Headache (Above the right eye/Since this morning)    Subjective     History of Present Illness:  Mayito Bassett is a 53 y.o. male who presents today for ER follow-up.  Patient was seen at [facility] on 10/8/2023.  He was seen for myositis and tendinitis of the right hand.  He was instructed to use a cock-up splint and start Mobic and was given a 5-day work excuse.  He did have an x-ray of his hand.  Patient reports that when the pain happens, he has a sharp and shooting pain that involves all of his fingers in his right hand.  Sometimes, the fingers lock up which can last a couple of minutes.  He does work at the end, and some detailing.  He is primarily left-handed but it is his right hand that is affected.  He has taken the meloxicam but is not sure that it helped.  No wrist pain today.    Insomnia: Patient has had a lot going on and he has not been taking his medication regularly.  He is having memory issues because he thinks he is not sleeping enough.  He has had a headache this morning but is improving.  He did recently have new pets and they are keeping him up at night.  Usually, when he took his trazodone it would help at that dose.  He did miss his trazodone dose yesterday.            Objective     Past Medical History:   Diagnosis Date    Anxiety     Arthritis     Asthma     Cancer     Chronic back pain     Congestive heart failure (CHF)     Depression     GERD (gastroesophageal reflux disease)     Obesity (BMI 30-39.9)     Seizures     Tendonitis     right wrist and hand, palms and fingers     Past Surgical History:   Procedure Laterality Date    CARDIAC CATHETERIZATION Left  "12/06/2022    Procedure: Left Heart Cath;  Surgeon: Wil Bustos MD;  Location:  [facility];  Service: Cardiology;  Laterality: Left;    CARDIAC CATHETERIZATION  12/6/2022     Family History   Problem Relation Age of Onset    Heart disease Mother     Kidney disease Mother     Diabetes Mother     No Known Problems Sister     No Known Problems Brother     No Known Problems Maternal Grandmother     No Known Problems Maternal Grandfather     No Known Problems Paternal Grandmother     No Known Problems Paternal Grandfather     No Known Problems Daughter     No Known Problems Son     No Known Problems Cousin     Rheum arthritis Neg Hx     Osteoarthritis Neg Hx     Asthma Neg Hx     Heart failure Neg Hx     Hyperlipidemia Neg Hx     Hypertension Neg Hx     Migraines Neg Hx     Rashes / Skin problems Neg Hx     Seizures Neg Hx     Stroke Neg Hx     Thyroid disease Neg Hx        Vital Signs  /84   Pulse 92   Temp 98.6 øF (37 øC) (Infrared)   Ht 172.7 cm (67.99\")   Wt 102 kg (224 lb)   SpO2 99%   BMI 34.07 kg/mý   Estimated body mass index is 34.07 kg/mý as calculated from the following:    Height as of this encounter: 172.7 cm (67.99\").    Weight as of this encounter: 102 kg (224 lb).    Physical Exam  Vitals reviewed.   Constitutional:       Appearance: Normal appearance.   HENT:      Head: Normocephalic and atraumatic.   Cardiovascular:      Rate and Rhythm: Normal rate.   Pulmonary:      Effort: Pulmonary effort is normal.   Musculoskeletal:      Comments: Normal range of motion of right wrist, no tenderness to palpation at any joint   Neurological:      Mental Status: He is alert.               Assessment and Plan     Diagnoses and all orders for this visit:    1. Hand pain, right (Primary)  Assessment & Plan:  - Patient reports trigger fingers of all of the fingers in his hand intermittently  - We will obtain x-rays for further work-up  - We will start diclofenac twice daily for 2 " weeks  - Consider Ortho referral after x-ray for discussion of possible injections    Orders:  -     diclofenac (VOLTAREN) 75 MG EC tablet; Take 1 tablet by mouth 2 (Two) Times a Day for 14 days.  Dispense: 30 tablet; Refill: 0  -     XR Wrist 3+ View Right; Future  -     XR Hand 3+ View Right; Future    2. Right wrist pain  Assessment & Plan:  - Pathology is likely at the wrist, possible nerve impingement  -We will obtain x-ray for further evaluation management  - We will consider Ortho referral    Orders:  -     diclofenac (VOLTAREN) 75 MG EC tablet; Take 1 tablet by mouth 2 (Two) Times a Day for 14 days.  Dispense: 30 tablet; Refill: 0  -     XR Wrist 3+ View Right; Future  -     XR Hand 3+ View Right; Future    3. Insomnia, unspecified type  Assessment & Plan:  - Uncontrolled but patient did not take his medication today and did recently get new dogs  - Previously, he reports that the trazodone controlled his sleep, so I think this is all situational and he needs to make a change with his current pets  - We will continue trazodone 100 mg daily for now, can reevaluate at follow-up      4. Immunization due  -     Fluzone (or Fluarix & Flulaval for VFC) >6 Mos ([id number])         Follow Up  No follow-ups on file.    Irene Chi MD

## 2023-10-13 NOTE — ASSESSMENT & PLAN NOTE
- Uncontrolled but patient did not take his medication today and did recently get new dogs  - Previously, he reports that the trazodone controlled his sleep, so I think this is all situational and he needs to make a change with his current pets  - We will continue trazodone 100 mg daily for now, can reevaluate at follow-up

## 2023-10-13 NOTE — ASSESSMENT & PLAN NOTE
- Patient reports trigger fingers of all of the fingers in his hand intermittently  - We will obtain x-rays for further work-up  - We will start diclofenac twice daily for 2 weeks  - Consider Ortho referral after x-ray for discussion of possible injections

## 2023-10-13 NOTE — TELEPHONE ENCOUNTER
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

## 2023-10-13 NOTE — ASSESSMENT & PLAN NOTE
- Pathology is likely at the wrist, possible nerve impingement  -We will obtain x-ray for further evaluation management  - We will consider Ortho referral

## 2023-10-16 DIAGNOSIS — M79.641 HAND PAIN, RIGHT: ICD-10-CM

## 2023-10-25 DIAGNOSIS — G47.00 INSOMNIA, UNSPECIFIED TYPE: ICD-10-CM

## 2023-10-25 DIAGNOSIS — F32.1 MODERATE MAJOR DEPRESSION: ICD-10-CM

## 2023-10-25 DIAGNOSIS — R53.83 FATIGUE, UNSPECIFIED TYPE: ICD-10-CM

## 2023-10-25 RX ORDER — TRAZODONE HYDROCHLORIDE 100 MG/1
TABLET ORAL
Qty: 90 TABLET | Refills: 0 | Status: SHIPPED | OUTPATIENT
Start: 2023-10-25

## 2023-11-03 ENCOUNTER — OFFICE VISIT (OUTPATIENT)
Dept: FAMILY MEDICINE CLINIC | Facility: CLINIC | Age: 53
End: 2023-11-03
Payer: COMMERCIAL

## 2023-11-03 ENCOUNTER — LAB (OUTPATIENT)
Dept: LAB | Facility: HOSPITAL | Age: 53
End: 2023-11-03
Payer: COMMERCIAL

## 2023-11-03 VITALS
BODY MASS INDEX: 35.13 KG/M2 | TEMPERATURE: 98.6 F | SYSTOLIC BLOOD PRESSURE: 128 MMHG | WEIGHT: 231.8 LBS | DIASTOLIC BLOOD PRESSURE: 86 MMHG | HEIGHT: 68 IN | HEART RATE: 70 BPM

## 2023-11-03 DIAGNOSIS — M62.838 MUSCLE SPASM: ICD-10-CM

## 2023-11-03 DIAGNOSIS — K21.9 GASTROESOPHAGEAL REFLUX DISEASE, UNSPECIFIED WHETHER ESOPHAGITIS PRESENT: ICD-10-CM

## 2023-11-03 DIAGNOSIS — Z00.00 ANNUAL PHYSICAL EXAM: Primary | ICD-10-CM

## 2023-11-03 DIAGNOSIS — Z72.0 TOBACCO ABUSE: ICD-10-CM

## 2023-11-03 DIAGNOSIS — I50.22 CHRONIC SYSTOLIC (CONGESTIVE) HEART FAILURE: ICD-10-CM

## 2023-11-03 DIAGNOSIS — K59.00 CONSTIPATION, UNSPECIFIED CONSTIPATION TYPE: ICD-10-CM

## 2023-11-03 DIAGNOSIS — G40.909 SEIZURE DISORDER: ICD-10-CM

## 2023-11-03 DIAGNOSIS — G47.00 INSOMNIA, UNSPECIFIED TYPE: ICD-10-CM

## 2023-11-03 DIAGNOSIS — Z00.00 ANNUAL PHYSICAL EXAM: ICD-10-CM

## 2023-11-03 LAB
ALBUMIN SERPL-MCNC: 4.6 G/DL (ref 3.5–5.2)
ALBUMIN/GLOB SERPL: 1.6 G/DL
ALP SERPL-CCNC: 89 U/L (ref 39–117)
ALT SERPL W P-5'-P-CCNC: 37 U/L (ref 1–41)
ANION GAP SERPL CALCULATED.3IONS-SCNC: 10 MMOL/L (ref 5–15)
AST SERPL-CCNC: 25 U/L (ref 1–40)
BILIRUB SERPL-MCNC: 0.3 MG/DL (ref 0–1.2)
BUN SERPL-MCNC: 12 MG/DL (ref 6–20)
BUN/CREAT SERPL: 12.9 (ref 7–25)
CALCIUM SPEC-SCNC: 9.5 MG/DL (ref 8.6–10.5)
CHLORIDE SERPL-SCNC: 106 MMOL/L (ref 98–107)
CHOLEST SERPL-MCNC: 171 MG/DL (ref 0–200)
CO2 SERPL-SCNC: 24 MMOL/L (ref 22–29)
CREAT SERPL-MCNC: 0.93 MG/DL (ref 0.76–1.27)
EGFRCR SERPLBLD CKD-EPI 2021: 97.6 ML/MIN/1.73
GLOBULIN UR ELPH-MCNC: 2.8 GM/DL
GLUCOSE SERPL-MCNC: 114 MG/DL (ref 65–99)
HBA1C MFR BLD: 5.3 % (ref 4.8–5.6)
HDLC SERPL-MCNC: 40 MG/DL (ref 40–60)
LDLC SERPL CALC-MCNC: 97 MG/DL (ref 0–100)
LDLC/HDLC SERPL: 2.28 {RATIO}
NT-PROBNP SERPL-MCNC: <36 PG/ML (ref 0–900)
POTASSIUM SERPL-SCNC: 4.4 MMOL/L (ref 3.5–5.2)
PROT SERPL-MCNC: 7.4 G/DL (ref 6–8.5)
SODIUM SERPL-SCNC: 140 MMOL/L (ref 136–145)
TRIGL SERPL-MCNC: 200 MG/DL (ref 0–150)
VLDLC SERPL-MCNC: 34 MG/DL (ref 5–40)

## 2023-11-03 PROCEDURE — 80061 LIPID PANEL: CPT

## 2023-11-03 PROCEDURE — 1160F RVW MEDS BY RX/DR IN RCRD: CPT | Performed by: STUDENT IN AN ORGANIZED HEALTH CARE EDUCATION/TRAINING PROGRAM

## 2023-11-03 PROCEDURE — 99396 PREV VISIT EST AGE 40-64: CPT | Performed by: STUDENT IN AN ORGANIZED HEALTH CARE EDUCATION/TRAINING PROGRAM

## 2023-11-03 PROCEDURE — 80053 COMPREHEN METABOLIC PANEL: CPT

## 2023-11-03 PROCEDURE — 83880 ASSAY OF NATRIURETIC PEPTIDE: CPT

## 2023-11-03 PROCEDURE — 1159F MED LIST DOCD IN RCRD: CPT | Performed by: STUDENT IN AN ORGANIZED HEALTH CARE EDUCATION/TRAINING PROGRAM

## 2023-11-03 PROCEDURE — 83036 HEMOGLOBIN GLYCOSYLATED A1C: CPT

## 2023-11-03 PROCEDURE — 36415 COLL VENOUS BLD VENIPUNCTURE: CPT

## 2023-11-03 RX ORDER — VARENICLINE TARTRATE 0.5 (11)-1
KIT ORAL
Qty: 1 EACH | Refills: 0 | Status: SHIPPED | OUTPATIENT
Start: 2023-11-03 | End: 2023-12-01

## 2023-11-03 RX ORDER — TRAZODONE HYDROCHLORIDE 100 MG/1
TABLET ORAL
Qty: 90 TABLET | Refills: 1 | Status: SHIPPED | OUTPATIENT
Start: 2023-11-03

## 2023-11-03 RX ORDER — FAMOTIDINE 20 MG/1
20 TABLET, FILM COATED ORAL 2 TIMES DAILY
Qty: 180 TABLET | Refills: 1 | Status: SHIPPED | OUTPATIENT
Start: 2023-11-03

## 2023-11-03 RX ORDER — VARENICLINE TARTRATE 1 MG/1
1 TABLET, FILM COATED ORAL 2 TIMES DAILY
Qty: 56 TABLET | Refills: 1 | Status: SHIPPED | OUTPATIENT
Start: 2023-12-01 | End: 2024-01-26

## 2023-11-03 NOTE — ASSESSMENT & PLAN NOTE
- Controlled, neurology follow-up  - Continue Keppra 1000mg twice daily and zonisamide 300mg daily per neurology

## 2023-11-03 NOTE — ASSESSMENT & PLAN NOTE
- Discussed diet and exercise: exercising at least 30 minutes/day, 5x/week and eating more fresh fruits and vegetables and limiting intake of fatty foods - particularly fast food  - Recommended annual eye and dental visits  - Had colonoscopy 2 years ago at , I did review the pathology report and it appears normal, so we will have repeat testing and 2031  - Up-to-date on all vaccines except for shingles #2, which patient can get at the pharmacy   - Obtaining basic screening labs today

## 2023-11-03 NOTE — PATIENT INSTRUCTIONS

## 2023-11-03 NOTE — ASSESSMENT & PLAN NOTE
- Patient with heavy smoking history, currently down to 1 pack/day  - We will start Chantix and follow-up in 1 month  -CT lung cancer screen ordered

## 2023-11-03 NOTE — PROGRESS NOTES
Office Note     Name: Mayito Bassett    : 1970     MRN: [id number]     Chief Complaint  Annual Exam    Subjective     History of Present Illness:  Mayito Bassett is a 53 y.o. male who presents today for annual physical.     Epilepsy:  Had first seizure in . He is currently on Keppra 1000mg twice daily and zonisamide 300mg daily.  Seeing neurology.  No recent seizure-like activity.     Heart failure: Following with cardiology.  Currently on valsartan 40 mg daily and spironolactone 25 mg daily.  Most recent echo with EF of 41-45%.      Muscle spasms: Takes flexeril as needed, gets muscle spasms in legs and back, not need refills today.     GERD: On pepcid 20mg twice daily, symptoms controlled. Taking daily.  If does not take it, feels like he has symptoms coming back.      Constipation: Takes docusate and MiraLAX as needed.    Insomnia: On trazodone 100mg daily. Sleeping well on this dose.      Tobacco dependence: Has been smoking since age 15, down to 1PPD. Has tried patches and was smoking even on patches.  Would like to try Chantix.  Would like lung cancer screen.     HM:  -Patient is due for shingles vaccine #2, advised to go to the pharmacy to get this  -Patient reports that he had a colonoscopy 2 years ago and he said there were no polyps -I to see the pathology report from  which shows no pathologic abnormalities or evidence of adenoma or hyperplastic          Objective     Past Medical History:   Diagnosis Date    Anxiety     Arthritis     Asthma     Cancer     Chronic back pain     Congestive heart failure (CHF)     Depression     GERD (gastroesophageal reflux disease)     Obesity (BMI 30-39.9)     Seizures     Tendonitis     right wrist and hand, palms and fingers     Past Surgical History:   Procedure Laterality Date    CARDIAC CATHETERIZATION Left 2022    Procedure: Left Heart Cath;  Surgeon: Wil Bustos MD;  Location: [facility] CATH INVASIVE LOCATION;  Service:  "Cardiology;  Laterality: Left;    CARDIAC CATHETERIZATION  12/6/2022     Family History   Problem Relation Age of Onset    Heart disease Mother     Kidney disease Mother     Diabetes Mother     No Known Problems Sister     No Known Problems Brother     No Known Problems Maternal Grandmother     No Known Problems Maternal Grandfather     No Known Problems Paternal Grandmother     No Known Problems Paternal Grandfather     No Known Problems Daughter     No Known Problems Son     No Known Problems Cousin     Rheum arthritis Neg Hx     Osteoarthritis Neg Hx     Asthma Neg Hx     Heart failure Neg Hx     Hyperlipidemia Neg Hx     Hypertension Neg Hx     Migraines Neg Hx     Rashes / Skin problems Neg Hx     Seizures Neg Hx     Stroke Neg Hx     Thyroid disease Neg Hx        Vital Signs  /86   Pulse 70   Temp 98.6 °F (37 °C) (Infrared)   Ht 172.7 cm (68\")   Wt 105 kg (231 lb 12.8 oz)   BMI 35.25 kg/m²   Estimated body mass index is 35.25 kg/m² as calculated from the following:    Height as of this encounter: 172.7 cm (68\").    Weight as of this encounter: 105 kg (231 lb 12.8 oz).    Physical Exam  Vitals reviewed.   Constitutional:       Appearance: Normal appearance.   HENT:      Head: Normocephalic.      Right Ear: External ear normal.      Left Ear: External ear normal.      Nose: Nose normal.      Mouth/Throat:      Mouth: Mucous membranes are moist.   Eyes:      Extraocular Movements: Extraocular movements intact.   Cardiovascular:      Rate and Rhythm: Normal rate and regular rhythm.      Heart sounds: Normal heart sounds.   Pulmonary:      Effort: Pulmonary effort is normal. No respiratory distress.      Breath sounds: Normal breath sounds.   Abdominal:      General: Abdomen is flat.      Palpations: Abdomen is soft.   Musculoskeletal:      Cervical back: No rigidity.      Comments: Moving all extremities spontaneously   Skin:     General: Skin is warm and dry.   Neurological:      General: No focal " deficit present.      Mental Status: He is alert and oriented to person, place, and time.   Psychiatric:         Mood and Affect: Mood normal.         Behavior: Behavior normal.               Assessment and Plan     Diagnoses and all orders for this visit:    1. Annual physical exam (Primary)  Assessment & Plan:  - Discussed diet and exercise: exercising at least 30 minutes/day, 5x/week and eating more fresh fruits and vegetables and limiting intake of fatty foods - particularly fast food  - Recommended annual eye and dental visits  - Had colonoscopy 2 years ago at , I did review the pathology report and it appears normal, so we will have repeat testing and 2031  - Up-to-date on all vaccines except for shingles #2, which patient can get at the pharmacy   - Obtaining basic screening labs today    Orders:  -     Comprehensive Metabolic Panel; Future  -     Lipid Panel; Future  -     Hemoglobin A1c; Future    2. Insomnia, unspecified type  Assessment & Plan:  - Controlled  - We will continue trazodone 100 mg daily    Orders:  -     traZODone (DESYREL) 100 MG tablet; Take one tablet at bedtime  Dispense: 90 tablet; Refill: 1    3. Constipation, unspecified constipation type  Assessment & Plan:  - Patient with intermittent constipation, takes docusate and MiraLAX as needed      4. Gastroesophageal reflux disease, unspecified whether esophagitis present  Assessment & Plan:  - controlled  - continue pepcid daily    Orders:  -     famotidine (PEPCID) 20 MG tablet; Take 1 tablet by mouth 2 (Two) Times a Day.  Dispense: 180 tablet; Refill: 1    5. Chronic systolic (congestive) heart failure  Assessment & Plan:  - Patient had ECHO done which showed an EF of 41 - 45% and moderately decreased left ventricular systolic function  - Patient follows with cardiology  - Continue valsartan 40 mg daily and spironolactone 25 mg daily      6. Muscle spasm  Assessment & Plan:  - Patient with intermittent spasms in his back and legs  -  Continue Flexeril as needed      7. Tobacco abuse  Assessment & Plan:  - Patient with heavy smoking history, currently down to 1 pack/day  - We will start Chantix and follow-up in 1 month  -CT lung cancer screen ordered    Orders:  -     Varenicline Tartrate, Starter, 0.5 MG X 11 & 1 MG X 42 tablet therapy pack; Take 0.5 mg by mouth Daily for 3 days, THEN 0.5 mg 2 (Two) Times a Day for 4 days, THEN 1 mg 2 (Two) Times a Day for 21 days. Take 0.5 mg po daily x 3 days, then 0.5 mg po bid x 4 days, then 1 mg po bid  Dispense: 1 each; Refill: 0  -     varenicline (Chantix Continuing Month Pak) 1 MG tablet; Take 1 tablet by mouth 2 (Two) Times a Day for 56 days.  Dispense: 56 tablet; Refill: 1  -      CT Chest Low Dose Cancer Screening WO; Future    8. Seizure disorder  Assessment & Plan:  - Controlled, neurology follow-up  - Continue Keppra 1000mg twice daily and zonisamide 300mg daily per neurology               Smoking Cessation:   less than 3 minutes spent counseling Agreeable to stop    Follow Up  Return in about 1 month (around 12/3/2023) for follow up  smoking.    Irene Chi MD

## 2023-11-03 NOTE — ASSESSMENT & PLAN NOTE
- Patient had ECHO done which showed an EF of 41 - 45% and moderately decreased left ventricular systolic function  - Patient follows with cardiology  - Continue valsartan 40 mg daily and spironolactone 25 mg daily

## 2023-11-13 ENCOUNTER — OFFICE VISIT (OUTPATIENT)
Age: 53
End: 2023-11-13
Payer: COMMERCIAL

## 2023-11-13 VITALS
WEIGHT: 235 LBS | HEIGHT: 67 IN | SYSTOLIC BLOOD PRESSURE: 110 MMHG | BODY MASS INDEX: 36.88 KG/M2 | DIASTOLIC BLOOD PRESSURE: 60 MMHG

## 2023-11-13 DIAGNOSIS — M79.641 PAIN OF RIGHT HAND: Primary | ICD-10-CM

## 2023-11-13 PROCEDURE — 1159F MED LIST DOCD IN RCRD: CPT | Performed by: PLASTIC SURGERY

## 2023-11-13 PROCEDURE — 99204 OFFICE O/P NEW MOD 45 MIN: CPT | Performed by: PLASTIC SURGERY

## 2023-11-13 PROCEDURE — 1160F RVW MEDS BY RX/DR IN RCRD: CPT | Performed by: PLASTIC SURGERY

## 2023-11-13 NOTE — PROGRESS NOTES
[facility] Orthopedic     Office Visit       Date: 11/13/2023   Patient Name: Mayito Bassett  MRN: [id number]  YOB: 1970    Referring Physician: Irene Chi MD     Chief Complaint:   Chief Complaint   Patient presents with    Right Hand - Pain       History of Present Illness:   Mayito Bassett is a 53 y.o. male left-hand-dominant presents with a 5+ year history of right hand pain and cramping.  Patient works detailing cars at a local [employer] reports that he often has right hand pain and cramping at work.  He reports approximately 3 episodes where his hand cramps up in the middle of working causes him significant pain.  He otherwise has no has been well.  He denies numbness or tingling.  Denies weakness of the affected hand.  Denies pain at rest.  Denies nocturnal symptoms.  Denies catching or locking of his digits in flexion. denies a history of rheumatoid arthritis.  Smokes 1 pack/day.  No other relevant medical history.      Subjective   Review of Systems:   Review of Systems   Constitutional:  Negative for chills, fever, unexpected weight gain and unexpected weight loss.   HENT:  Negative for congestion, postnasal drip and rhinorrhea.    Eyes:  Negative for blurred vision.   Respiratory:  Negative for shortness of breath.    Cardiovascular:  Negative for leg swelling.   Gastrointestinal:  Negative for abdominal pain, nausea and vomiting.   Genitourinary:  Negative for difficulty urinating.   Musculoskeletal:  Positive for arthralgias. Negative for gait problem, joint swelling and myalgias.   Skin:  Negative for skin lesions and wound.   Neurological:  Negative for dizziness, weakness, light-headedness and numbness.   Hematological:  Does not bruise/bleed easily.   Psychiatric/Behavioral:  Negative for depressed mood.    All other systems reviewed and are negative.       Pertinent review of systems per HPI.  "    I reviewed the patient's chief complaint, history of present illness, review of systems, past medical history, surgical history, family history, social history, medications and allergy list in the EMR on 11/13/2023 and agree with the findings above.    Objective    Vital Signs:   Vitals:    11/13/23 0809   BP: 110/60   Weight: 107 kg (235 lb)   Height: 170.2 cm (67\")     BMI: Class 2 Severe Obesity (BMI >=35 and <=39.9). O  General Appearance: No acute distress. Alert and oriented.     Chest:  Non-labored breathing on room air. Regular rate and rhythm.    Right upper Extremity Exam:    Completely nontender throughout the entirety of the hand.  Nontender the A1 pulleys of all 5 digits.  No catching or locking of the digit.    No evidence of intrinsic wasting.  Sensation intact to light touch median, radial and ulnar nerve distributions.  Negative Tinel at the carpal tunnel.  Negative Tinel at the elbow.  Negative carpal compression test.  Tunnel compression test    No palpable masses or visible lesions  Fingers are warm, well-perfused with appropriate capillary refill.  Palpable radial pulse.    Motor- Fires FPL, ulnar intrinsics, EPL/EDC w/ full active and passive range of motion. Strength intact.      Imaging/Studies:   Imaging Results (Last 24 Hours)       ** No results found for the last 24 hours. **            X-ray of the right hand and wrist from 10/13/2023 were independently reviewed by myself and demonstrated no evidence of bony abnormality    Procedures:  Procedures    Quality Measures:   ACP:   ACP discussion was declined by the patient. Patient does not have an advance directive, declines further assistance.    Tobacco:   Mayito Martin Serjio  reports that he has been smoking cigarettes. He has a 37.00 pack-year smoking history. He has never used smokeless tobacco.. I have educated him on the risk of diseases from using tobacco products such as cancer, COPD, and heart disease.     I advised him to " quit and he is not willing to quit.    I spent 3  minutes counseling the patient.              Assessment / Plan    Assessment/Plan:     There are no diagnoses linked to this encounter.     Mayito Coleman a 53 y.o. male who presents with:      ICD-10-CM ICD-9-CM   1. Pain of right hand  M79.641 729.5     Patient presents with several year history of right hand pain and cramping with work.  Patient is unable to localize the pain and is no tenderness or symptoms except during these intermittent episodes at work.  His x-rays are negative and there is no provocative signs on physical exam.  Hand pain is likely due to muscle cramping fatigue at work.  Recommend patient wear a brace to rest his hand when he is episodes occur.  Encourage anti-inflammatories.  No other intervention at this time.  Consider hand therapy continues to have persistent symptoms.  Recommend patient follow-up in 8 weeks.    Follow Up:   Return in about 8 weeks (around 1/8/2024).        Clarence Disla MD  [facility] Hand and Upper Extremity Surgeon

## 2023-12-03 ENCOUNTER — HOSPITAL ENCOUNTER (OUTPATIENT)
Dept: CT IMAGING | Facility: HOSPITAL | Age: 53
Discharge: HOME | End: 2023-12-03
Admitting: STUDENT IN AN ORGANIZED HEALTH CARE EDUCATION/TRAINING PROGRAM
Payer: COMMERCIAL

## 2023-12-03 DIAGNOSIS — Z72.0 TOBACCO ABUSE: ICD-10-CM

## 2023-12-03 PROCEDURE — 71271 CT THORAX LUNG CANCER SCR C-: CPT

## 2023-12-04 ENCOUNTER — TELEPHONE (OUTPATIENT)
Dept: CARDIOLOGY | Facility: CLINIC | Age: 53
End: 2023-12-04
Payer: COMMERCIAL

## 2023-12-04 ENCOUNTER — OFFICE VISIT (OUTPATIENT)
Dept: FAMILY MEDICINE CLINIC | Facility: CLINIC | Age: 53
End: 2023-12-04
Payer: COMMERCIAL

## 2023-12-04 VITALS
WEIGHT: 230.02 LBS | TEMPERATURE: 98.7 F | OXYGEN SATURATION: 98 % | HEIGHT: 67 IN | HEART RATE: 72 BPM | BODY MASS INDEX: 36.1 KG/M2 | SYSTOLIC BLOOD PRESSURE: 124 MMHG | DIASTOLIC BLOOD PRESSURE: 78 MMHG

## 2023-12-04 DIAGNOSIS — Z72.0 TOBACCO ABUSE: Primary | ICD-10-CM

## 2023-12-04 DIAGNOSIS — R07.9 CHEST PAIN, UNSPECIFIED TYPE: ICD-10-CM

## 2023-12-04 RX ORDER — POLYETHYLENE GLYCOL 3350 17 G
2 POWDER IN PACKET (EA) ORAL AS NEEDED
Qty: 168 EACH | Refills: 0 | Status: SHIPPED | OUTPATIENT
Start: 2023-12-04

## 2023-12-04 NOTE — ASSESSMENT & PLAN NOTE
- Patient with heavy smoking history, currently down to 1 pack/day  - Patient could not tolerate Chantix and is not a candidate for Wellbutrin  - We will start lozenges

## 2023-12-04 NOTE — ASSESSMENT & PLAN NOTE
- Patient without any chest pain at this time but reports that he did have an episode this morning  - I did discuss with him that if he were to have recurrent chest pain, he needs to go to the ER for evaluation

## 2023-12-04 NOTE — TELEPHONE ENCOUNTER
I agree with your recommendations.  He had a heart cath in December 2022 with mild nonobstructive CAD, so it could be Chantix related chest pain

## 2023-12-04 NOTE — PROGRESS NOTES
Office Note     Name: Mayito Bassett    : 1970     MRN: [id number]     Chief Complaint  Nicotine Dependence (Discuss chantix/)    Subjective     History of Present Illness:  Mayito Bassett is a 53 y.o. male who presents today for tobacco dependence.  Patient started on Chantix and stopped taking it 1 week ago because it would give him chest pain.  He reports that he has been off of the Chantix for 1 week and he woke up this morning with some chest pain that was dull and lasted for 30 to 45 minutes.  He is not a candidate for Wellbutrin due to seizure risk.  Patient is okay with trying some lozenges.  He is currently smoking 1 pack/day.  Denies any current chest pain.        Objective     Past Medical History:   Diagnosis Date    Anxiety     Arthritis     Asthma     Cancer     Chronic back pain     Congestive heart failure (CHF)     Depression     EKG abnormalities 2022    GERD (gastroesophageal reflux disease)     Obesity (BMI 30-39.9)     Seizures     Tendonitis     right wrist and hand, palms and fingers     Past Surgical History:   Procedure Laterality Date    CARDIAC CATHETERIZATION Left 2022    Procedure: Left Heart Cath;  Surgeon: Wil Bustos MD;  Location: [facility] CATH INVASIVE LOCATION;  Service: Cardiology;  Laterality: Left;    CARDIAC CATHETERIZATION  2022    WRIST GANGLION EXCISION Left      Family History   Problem Relation Age of Onset    Heart disease Mother     Kidney disease Mother     Diabetes Mother     No Known Problems Sister     No Known Problems Brother     No Known Problems Maternal Grandmother     No Known Problems Maternal Grandfather     No Known Problems Paternal Grandmother     No Known Problems Paternal Grandfather     No Known Problems Daughter     No Known Problems Son     No Known Problems Cousin     Rheum arthritis Neg Hx     Osteoarthritis Neg Hx     Asthma Neg Hx     Heart failure Neg Hx     Hyperlipidemia Neg Hx     Hypertension  "Neg Hx     Migraines Neg Hx     Rashes / Skin problems Neg Hx     Seizures Neg Hx     Stroke Neg Hx     Thyroid disease Neg Hx        Vital Signs  /78   Pulse 72   Temp 98.7 °F (37.1 °C) (Infrared)   Ht 170.2 cm (67.01\")   Wt 104 kg (230 lb 0.3 oz)   SpO2 98%   BMI 36.02 kg/m²   Estimated body mass index is 36.02 kg/m² as calculated from the following:    Height as of this encounter: 170.2 cm (67.01\").    Weight as of this encounter: 104 kg (230 lb 0.3 oz).    Physical Exam  Vitals reviewed.   Constitutional:       Appearance: Normal appearance.   HENT:      Head: Normocephalic and atraumatic.   Cardiovascular:      Rate and Rhythm: Normal rate and regular rhythm.   Pulmonary:      Effort: Pulmonary effort is normal.      Breath sounds: Normal breath sounds.   Abdominal:      General: Abdomen is flat.      Palpations: Abdomen is soft.   Neurological:      Mental Status: He is alert.               Assessment and Plan     Diagnoses and all orders for this visit:    1. Tobacco abuse (Primary)  Assessment & Plan:  - Patient with heavy smoking history, currently down to 1 pack/day  - Patient could not tolerate Chantix and is not a candidate for Wellbutrin  - We will start lozenges    Orders:  -     nicotine polacrilex (COMMIT) 2 MG lozenge; Dissolve 1 lozenge in the mouth As Needed for Smoking Cessation. Weeks 1 to 6: 1 lozenge every 1 to 2 hours (maximum: 5 lozenges every 6 hours; 20 lozenges/day), Weeks 7 to 9: 1 lozenge every 2 to 4 hours (maximum: 5 lozenges every 6 hours; 20 lozenges/day), Weeks 10 to 12: 1 lozenge every 4 to 8 hours (maximum: 5 lozenges every 6 hours; 20 lozenges/day).  Dispense: 168 each; Refill: 0    2. Chest pain, unspecified type  Assessment & Plan:  - Patient without any chest pain at this time but reports that he did have an episode this morning  - I did discuss with him that if he were to have recurrent chest pain, he needs to go to the ER for evaluation           *Patient " advised that he will be moving back to [place] at the start of next year.  He will let me know if anything changes.    Follow Up  No follow-ups on file.    Irene Chi MD

## 2023-12-04 NOTE — TELEPHONE ENCOUNTER
Patient called with concerns of chest pain since he started taking CHANTIX. Patient has not taken BP or HR.     Patient does NOT complain of pain radiating, no SOA, not diaphoretic.    Patient states the chest pain is subsiding.    I instructed patient to stop the chantix and if chest pain continues to go to emergency room.

## 2023-12-26 DIAGNOSIS — G40.909 NONINTRACTABLE EPILEPSY WITHOUT STATUS EPILEPTICUS, UNSPECIFIED EPILEPSY TYPE: ICD-10-CM

## 2023-12-26 RX ORDER — ZONISAMIDE 100 MG/1
CAPSULE ORAL
Qty: 90 CAPSULE | Refills: 0 | Status: SHIPPED | OUTPATIENT
Start: 2023-12-26

## 2023-12-26 NOTE — TELEPHONE ENCOUNTER
Rx Refill Note  Requested Prescriptions     Pending Prescriptions Disp Refills    zonisamide (ZONEGRAN) 100 MG capsule [Pharmacy Med Name: Zonisamide 100 MG Oral Capsule] 90 capsule 0     Sig: TAKE 3 CAPSULES BY MOUTH EVERY NIGHT AT BEDTIME FOR PREVENTIION OF SEIZURES      Last filled:  01/25/2023 w/11  Last office visit with prescribing clinician: 1/25/2023      Next office visit with prescribing clinician: 1/25/2024     Regina Darnell MA  12/26/23, 09:24 EST

## 2024-01-17 ENCOUNTER — TELEPHONE (OUTPATIENT)
Dept: NEUROLOGY | Facility: CLINIC | Age: 54
End: 2024-01-17
Payer: COMMERCIAL

## 2024-01-17 NOTE — TELEPHONE ENCOUNTER
Called patient and he needs labs sent to Dr. Ospina [address] [place] [address]  Doxy visit 1/18 at 10:00.

## 2024-01-17 NOTE — TELEPHONE ENCOUNTER
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    Who is requesting this form or medical record from you:   [facility] OF [place]    How would you like to receive the form or medical records (pick-up, mail, fax): MAILED  If fax, what is the fax number:   If mail, what is the address: PT'S HOME ADDRESS ON FILE ([place] ADDRESS)  If pick-up, provide patient with address and location details    Timeframe paperwork needed: IN THE NEXT WEEK    Additional notes:   PT SAID HE HAS BEEN ABOUT 2 TO 3 YEARS WITHOUT HAVING A SEIZURE.

## 2024-01-18 ENCOUNTER — TELEMEDICINE (OUTPATIENT)
Dept: NEUROLOGY | Facility: CLINIC | Age: 54
End: 2024-01-18
Payer: COMMERCIAL

## 2024-01-18 DIAGNOSIS — G40.909 NONINTRACTABLE EPILEPSY WITHOUT STATUS EPILEPTICUS, UNSPECIFIED EPILEPSY TYPE: Primary | ICD-10-CM

## 2024-01-18 PROCEDURE — 1159F MED LIST DOCD IN RCRD: CPT | Performed by: NURSE PRACTITIONER

## 2024-01-18 PROCEDURE — 1160F RVW MEDS BY RX/DR IN RCRD: CPT | Performed by: NURSE PRACTITIONER

## 2024-01-18 PROCEDURE — 99214 OFFICE O/P EST MOD 30 MIN: CPT | Performed by: NURSE PRACTITIONER

## 2024-01-18 RX ORDER — LEVETIRACETAM 1000 MG/1
1000 TABLET ORAL 2 TIMES DAILY
Qty: 60 TABLET | Refills: 5 | Status: SHIPPED | OUTPATIENT
Start: 2024-01-18

## 2024-01-18 RX ORDER — ZONISAMIDE 100 MG/1
300 CAPSULE ORAL NIGHTLY
Qty: 90 CAPSULE | Refills: 5 | Status: SHIPPED | OUTPATIENT
Start: 2024-01-18

## 2024-01-18 NOTE — PROGRESS NOTES
Neuro Office Visit      Encounter Date: 2024   Patient Name: aMyito Bassett  : 1970   MRN: [id number]   PCP: Dr Chi  Chief Complaint:    Chief Complaint   Patient presents with    Seizures       History of Present Illness: Mayito Bassett is a 53 y.o. male who is here today in Neurology for  seizures    You have chosen to receive care through a telehealth visit.  Do you consent to use a video/audio connection for your medical care today? yes     Last visit 2023 w me-cont Keppra 1000 bid w zonesamide 100mg 3 q hs.    Pt is moving to [place] and will transfer his care to his previous neurologist.  Requests letter to potential employer stating he has been seizure free.      Seizures  Doing well. NO seizures in the last year. Compliant with meds.  Medication:Keppra 1000 bid, zonisamine 100 3 q hs  Compliance:no issues  Side effects:none  Last seizure:> 1 year ago  Aura:none  Triggers:none  Description:GTC seizure. Blank stare. Can be violent. Had tongue biting. Has incontinence of bladder and bowels. Has moving of arms and legs. Confused upon awakening.     Previously on Carbamezapine 200 tid  See care everywhere. EEG with generalized epilepsy. Frontal sharps.     PH  Onset . Previous Neurologist admitted him for a week. Had a seizure. Meds changed and has been seizure free since.  No history of stroke or TBI.     PMH: epilepsy, bradycardia, muscle spasms, GERD, insmonia, vit D, tob use  FH:cousin w seizure.  SH: [employer] in [facility]. Lives w sister. Has a son.  Subjective      Past Medical History:   Past Medical History:   Diagnosis Date    Anxiety     Arthritis     Asthma     Cancer     Chronic back pain     Congestive heart failure (CHF)     Depression     EKG abnormalities 2022    GERD (gastroesophageal reflux disease)     Obesity (BMI 30-39.9)     Seizures     Tendonitis     right wrist and hand, palms and fingers       Past Surgical History:   Past  Surgical History:   Procedure Laterality Date    CARDIAC CATHETERIZATION Left 12/06/2022    Procedure: Left Heart Cath;  Surgeon: Wil Bustos MD;  Location: [facility] CATH INVASIVE LOCATION;  Service: Cardiology;  Laterality: Left;    CARDIAC CATHETERIZATION  12/6/2022    WRIST GANGLION EXCISION Left        Family History:   Family History   Problem Relation Age of Onset    Heart disease Mother     Kidney disease Mother     Diabetes Mother     No Known Problems Sister     No Known Problems Brother     No Known Problems Maternal Grandmother     No Known Problems Maternal Grandfather     No Known Problems Paternal Grandmother     No Known Problems Paternal Grandfather     No Known Problems Daughter     No Known Problems Son     No Known Problems Cousin     Rheum arthritis Neg Hx     Osteoarthritis Neg Hx     Asthma Neg Hx     Heart failure Neg Hx     Hyperlipidemia Neg Hx     Hypertension Neg Hx     Migraines Neg Hx     Rashes / Skin problems Neg Hx     Seizures Neg Hx     Stroke Neg Hx     Thyroid disease Neg Hx        Social History:   Social History     Socioeconomic History    Marital status: Single   Tobacco Use    Smoking status: Every Day     Packs/day: 1.00     Years: 37.00     Additional pack years: 0.00     Total pack years: 37.00     Types: Cigarettes    Smokeless tobacco: Never   Vaping Use    Vaping Use: Never used   Substance and Sexual Activity    Alcohol use: Yes     Comment: 1 A MONTH    Drug use: Never     Types: Marijuana     Comment: YEARS AGO    Sexual activity: Not Currently     Partners: Female     Birth control/protection: None       Medications:     Current Outpatient Medications:     levETIRAcetam (KEPPRA) 1000 MG tablet, Take 1 tablet by mouth 2 (Two) Times a Day., Disp: 60 tablet, Rfl: 5    zonisamide (ZONEGRAN) 100 MG capsule, Take 3 capsules by mouth Every Night., Disp: 90 capsule, Rfl: 5    aspirin 81 MG EC tablet, Take 1 tablet by mouth Daily., Disp: , Rfl:     cyclobenzaprine  (FLEXERIL) 10 MG tablet, Take 1 tablet by mouth At Night As Needed for Muscle Spasms., Disp: 30 tablet, Rfl: 5    famotidine (PEPCID) 20 MG tablet, Take 1 tablet by mouth 2 (Two) Times a Day., Disp: 180 tablet, Rfl: 1    Multiple Vitamin (MULTI VITAMIN MENS PO), Take  by mouth., Disp: , Rfl:     nicotine polacrilex (COMMIT) 2 MG lozenge, Dissolve 1 lozenge in the mouth As Needed for Smoking Cessation. Weeks 1 to 6: 1 lozenge every 1 to 2 hours (maximum: 5 lozenges every 6 hours; 20 lozenges/day), Weeks 7 to 9: 1 lozenge every 2 to 4 hours (maximum: 5 lozenges every 6 hours; 20 lozenges/day), Weeks 10 to 12: 1 lozenge every 4 to 8 hours (maximum: 5 lozenges every 6 hours; 20 lozenges/day)., Disp: 168 each, Rfl: 0    polyethylene glycol (MIRALAX) 17 GM/SCOOP powder, Take 17 g by mouth Daily., Disp: 510 g, Rfl: 0    sennosides-docusate (senna-docusate sodium) 8.6-50 MG per tablet, Take 1 tablet by mouth 2 (Two) Times a Day As Needed for Constipation., Disp: 60 tablet, Rfl: 0    spironolactone (ALDACTONE) 25 MG tablet, Take 1 tablet by mouth Daily., Disp: 30 tablet, Rfl: 11    traZODone (DESYREL) 100 MG tablet, Take one tablet at bedtime, Disp: 90 tablet, Rfl: 1    valsartan (DIOVAN) 40 MG tablet, Take 1 tablet by mouth Daily., Disp: 90 tablet, Rfl: 3    Allergies:   No Known Allergies    PHQ-9 Total Score:     STEADI Fall Risk Assessment has not been completed.    Objective     Physical Exam:   Physical Exam  Constitutional:       General: He is not in acute distress.     Appearance: Normal appearance. He is not ill-appearing or toxic-appearing.   HENT:      Head: Normocephalic and atraumatic.      Nose: Nose normal.   Eyes:      General:         Right eye: No discharge.         Left eye: No discharge.      Conjunctiva/sclera: Conjunctivae normal.   Pulmonary:      Effort: Pulmonary effort is normal. No respiratory distress.   Neurological:      General: No focal deficit present.      Mental Status: He is alert and  oriented to person, place, and time. Mental status is at baseline.   Psychiatric:         Mood and Affect: Mood normal.         Behavior: Behavior normal.         Thought Content: Thought content normal.         Judgment: Judgment normal.         Neurologic Exam     Mental Status   Oriented to person, place, and time.        Vital Signs: There were no vitals filed for this visit.  There is no height or weight on file to calculate BMI.          Assessment / Plan      Assessment/Plan:   Diagnoses and all orders for this visit:    1. Nonintractable epilepsy without status epilepticus, unspecified epilepsy type (Primary)  Comments:  Cont zonisamind 300 q hs and keppra 1000 bid. Check labs  Orders:  -     zonisamide (ZONEGRAN) 100 MG capsule; Take 3 capsules by mouth Every Night.  Dispense: 90 capsule; Refill: 5  -     levETIRAcetam (KEPPRA) 1000 MG tablet; Take 1 tablet by mouth 2 (Two) Times a Day.  Dispense: 60 tablet; Refill: 5  -     Levetiracetam Level (Keppra); Future  -     CBC Auto Differential; Future  -     Comprehensive Metabolic Panel; Future         Patient Education:     Reviewed medications, potential side effects and signs and symptoms to report. Discussed risk versus benefits of treatment plan with patient and/or family-including medications, labs and radiology that may be ordered. Addressed questions and concerns during visit. Patient and/or family verbalized understanding and agree with plan. Instructed to call the office with any questions and report to ER with any life-threatening symptoms.     Follow Up:   Return in about 6 months (around 7/18/2024).    During this visit the following were done:  Labs Reviewed []    Labs Ordered [x]    Radiology Reports Reviewed []    Radiology Ordered []    PCP Records Reviewed []    Referring Provider Records Reviewed []    ER Records Reviewed []    Hospital Records Reviewed []    History Obtained From Family []    Radiology Images Reviewed []    Other Reviewed  []    Records Requested []      Dimitri Rivera, DNP, APRN

## 2024-01-22 ENCOUNTER — PATIENT ROUNDING (BHMG ONLY) (OUTPATIENT)
Dept: FAMILY MEDICINE CLINIC | Facility: CLINIC | Age: 54
End: 2024-01-22
Payer: COMMERCIAL

## 2024-01-22 ENCOUNTER — OFFICE VISIT (OUTPATIENT)
Dept: FAMILY MEDICINE CLINIC | Facility: CLINIC | Age: 54
End: 2024-01-22
Payer: COMMERCIAL

## 2024-01-22 VITALS
DIASTOLIC BLOOD PRESSURE: 90 MMHG | TEMPERATURE: 97.7 F | HEIGHT: 67 IN | WEIGHT: 238 LBS | SYSTOLIC BLOOD PRESSURE: 130 MMHG | BODY MASS INDEX: 37.35 KG/M2 | RESPIRATION RATE: 18 BRPM | HEART RATE: 61 BPM | OXYGEN SATURATION: 98 %

## 2024-01-22 DIAGNOSIS — I50.22 CHRONIC SYSTOLIC (CONGESTIVE) HEART FAILURE: Primary | ICD-10-CM

## 2024-01-22 DIAGNOSIS — G47.00 INSOMNIA, UNSPECIFIED TYPE: ICD-10-CM

## 2024-01-22 DIAGNOSIS — K21.9 GASTROESOPHAGEAL REFLUX DISEASE, UNSPECIFIED WHETHER ESOPHAGITIS PRESENT: ICD-10-CM

## 2024-01-22 DIAGNOSIS — I25.10 CORONARY ARTERY DISEASE INVOLVING NATIVE HEART WITHOUT ANGINA PECTORIS, UNSPECIFIED VESSEL OR LESION TYPE: ICD-10-CM

## 2024-01-22 DIAGNOSIS — G40.909 SEIZURE DISORDER: ICD-10-CM

## 2024-01-22 PROCEDURE — 99214 OFFICE O/P EST MOD 30 MIN: CPT | Performed by: FAMILY MEDICINE

## 2024-01-22 PROCEDURE — 1160F RVW MEDS BY RX/DR IN RCRD: CPT | Performed by: FAMILY MEDICINE

## 2024-01-22 PROCEDURE — 1159F MED LIST DOCD IN RCRD: CPT | Performed by: FAMILY MEDICINE

## 2024-01-22 RX ORDER — ASPIRIN 81 MG/1
81 TABLET ORAL DAILY
Qty: 90 TABLET | Refills: 3 | Status: SHIPPED | OUTPATIENT
Start: 2024-01-22

## 2024-01-22 RX ORDER — SPIRONOLACTONE 25 MG/1
25 TABLET ORAL DAILY
Qty: 30 TABLET | Refills: 11 | Status: SHIPPED | OUTPATIENT
Start: 2024-01-22

## 2024-01-22 RX ORDER — TRAZODONE HYDROCHLORIDE 100 MG/1
TABLET ORAL
Qty: 90 TABLET | Refills: 1 | Status: SHIPPED | OUTPATIENT
Start: 2024-01-22

## 2024-01-22 RX ORDER — SACUBITRIL AND VALSARTAN 24; 26 MG/1; MG/1
1 TABLET, FILM COATED ORAL 2 TIMES DAILY
Qty: 60 TABLET | Refills: 11 | Status: SHIPPED | OUTPATIENT
Start: 2024-01-22

## 2024-01-22 RX ORDER — FAMOTIDINE 20 MG/1
20 TABLET, FILM COATED ORAL 2 TIMES DAILY
Qty: 180 TABLET | Refills: 1 | Status: SHIPPED | OUTPATIENT
Start: 2024-01-22

## 2024-01-22 NOTE — PATIENT INSTRUCTIONS
Stop valsartan  Start entresto one tablet twice daily  If blood presssure at or above 140/90, increase entresto to 2 tablets twice daily    Follow up in 4 weeks, will cosider farxiga at that time

## 2024-01-22 NOTE — PROGRESS NOTES
Chief Complaint  Establish Care (Pt states wanting to establish care here, moved back. Everything seems to be doing ok )    Subjective        Mayito Bassett presents to [facility]  History of Present Illness  Here to establish care needing referral to neurology for history of seizure without any seizure events on his current medication regimen of Keppra and zonisamide for the past 2 years.  He also needs a referral to cardiology with a history of chronic systolic heart failure and dilated cardiomyopathy, also had heart cath on 12/6/2022 with mild nonobstructive coronary artery disease.  He currently feels well today with no shortness of breath, chest pain.  He also needs refills of stable medications below, sleep and GERD are well-controlled    EF 41-45% on stress echo August 2020    Past Medical History:   Diagnosis Date    Anxiety     Arthritis     Asthma     Cancer     Chronic back pain     Congestive heart failure (CHF)     Depression     EKG abnormalities 07/27/2022    GERD (gastroesophageal reflux disease)     Obesity (BMI 30-39.9)     Seizures     Tendonitis 2023    right wrist and hand, palms and fingers     Past Surgical History:   Procedure Laterality Date    CARDIAC CATHETERIZATION Left 12/06/2022    Procedure: Left Heart Cath;  Surgeon: Wil Bustos MD;  Location: [facility] CATH INVASIVE LOCATION;  Service: Cardiology;  Laterality: Left;    CARDIAC CATHETERIZATION  12/6/2022    WRIST GANGLION EXCISION Left      Current Outpatient Medications   Medication Instructions    aspirin 81 mg, Oral, Daily    cyclobenzaprine (FLEXERIL) 10 mg, Oral, Nightly PRN    famotidine (PEPCID) 20 mg, Oral, 2 Times Daily    levETIRAcetam (KEPPRA) 1,000 mg, Oral, 2 Times Daily    Multiple Vitamin (MULTI VITAMIN MENS PO) Oral    polyethylene glycol (MIRALAX) 17 g, Oral, Daily    sacubitril-valsartan (Entresto) 24-26 MG tablet 1 tablet, Oral, 2 Times Daily    sennosides-docusate  "(senna-docusate sodium) 8.6-50 MG per tablet 1 tablet, Oral, 2 Times Daily PRN    spironolactone (ALDACTONE) 25 mg, Oral, Daily    traZODone (DESYREL) 100 MG tablet Take one tablet at bedtime    zonisamide (ZONEGRAN) 300 mg, Oral, Nightly       Objective   Vital Signs:  /90   Pulse 61   Temp 97.7 °F (36.5 °C)   Resp 18   Ht 170.2 cm (67.01\")   Wt 108 kg (238 lb)   SpO2 98%   BMI 37.26 kg/m²   Estimated body mass index is 37.26 kg/m² as calculated from the following:    Height as of this encounter: 170.2 cm (67.01\").    Weight as of this encounter: 108 kg (238 lb).         Physical Exam  Vitals and nursing note reviewed.   Constitutional:       General: He is not in acute distress.     Appearance: He is not diaphoretic.   HENT:      Head: Normocephalic and atraumatic.      Nose: Nose normal.   Eyes:      General: No scleral icterus.        Right eye: No discharge.         Left eye: No discharge.      Conjunctiva/sclera: Conjunctivae normal.   Neck:      Trachea: No tracheal deviation.   Pulmonary:      Effort: Pulmonary effort is normal.   Musculoskeletal:      Right lower leg: Right lower leg edema: trace bilateral.   Skin:     General: Skin is warm and dry.      Coloration: Skin is not pale.   Neurological:      Mental Status: He is alert and oriented to person, place, and time.   Psychiatric:         Behavior: Behavior normal.         Thought Content: Thought content normal.         Judgment: Judgment normal.        Result Review :                     Assessment and Plan     Diagnoses and all orders for this visit:    1. Chronic systolic (congestive) heart failure (Primary)  -     Ambulatory Referral to Cardiology  -     Adult Transthoracic Echo Complete W/ Cont if Necessary Per Protocol; Future  -     spironolactone (ALDACTONE) 25 MG tablet; Take 1 tablet by mouth Daily.  Dispense: 30 tablet; Refill: 11  -     sacubitril-valsartan (Entresto) 24-26 MG tablet; Take 1 tablet by mouth 2 (Two) Times a " Day.  Dispense: 60 tablet; Refill: 11  -     Basic metabolic panel; Future    2. Seizure disorder  -     Ambulatory Referral to Neurology    3. Coronary artery disease involving native heart without angina pectoris, unspecified vessel or lesion type  -     aspirin 81 MG EC tablet; Take 1 tablet by mouth Daily.  Dispense: 90 tablet; Refill: 3  -     Lipid panel; Future    4. Gastroesophageal reflux disease, unspecified whether esophagitis present  -     famotidine (PEPCID) 20 MG tablet; Take 1 tablet by mouth 2 (Two) Times a Day.  Dispense: 180 tablet; Refill: 1    5. Insomnia, unspecified type  -     traZODone (DESYREL) 100 MG tablet; Take one tablet at bedtime  Dispense: 90 tablet; Refill: 1    DC valsartan, add Entresto and ordered TTE for heart failure follow-up  Consider Farxiga at follow-up visit         Follow Up     Return in about 4 weeks (around 2/19/2024) for Recheck.  Patient was given instructions and counseling regarding his condition or for health maintenance advice. Please see specific information pulled into the AVS if appropriate.

## 2024-01-23 ENCOUNTER — TELEPHONE (OUTPATIENT)
Dept: FAMILY MEDICINE CLINIC | Facility: CLINIC | Age: 54
End: 2024-01-23

## 2024-01-23 NOTE — TELEPHONE ENCOUNTER
Pharmacy Name:  [facility]     Reference Number (if applicable DID NOT STATE     Pharmacy representative name: LUDY   Pharmacy representative phone number:[phone]     What medication are you calling in regards to: VALSARTAN AND ENTRESTO     What question does the pharmacy have: HE STATES PATIENT HAS valsartan AND ENTRESTO     WOULD LIKE TO BE ADVISED ABOUT THE ENTRESTO WANTS TO MAKE SURE THAT THE PATIENT WILL STOP TAKING THE VALSARTAN     Who is the provider that prescribed the medication: DR CASTILLO     Additional notes: NONE

## 2024-01-24 ENCOUNTER — OFFICE VISIT (OUTPATIENT)
Dept: CARDIOLOGY | Facility: CLINIC | Age: 54
End: 2024-01-24
Payer: COMMERCIAL

## 2024-01-24 VITALS
BODY MASS INDEX: 36.41 KG/M2 | WEIGHT: 232 LBS | HEART RATE: 55 BPM | DIASTOLIC BLOOD PRESSURE: 75 MMHG | OXYGEN SATURATION: 98 % | RESPIRATION RATE: 18 BRPM | HEIGHT: 67 IN | SYSTOLIC BLOOD PRESSURE: 122 MMHG

## 2024-01-24 DIAGNOSIS — R00.1 BRADYCARDIA, SINUS: ICD-10-CM

## 2024-01-24 DIAGNOSIS — I25.10 NONOBSTRUCTIVE ATHEROSCLEROSIS OF CORONARY ARTERY: ICD-10-CM

## 2024-01-24 DIAGNOSIS — I50.22 CHRONIC SYSTOLIC (CONGESTIVE) HEART FAILURE: Primary | ICD-10-CM

## 2024-01-24 DIAGNOSIS — Z72.0 TOBACCO ABUSE: ICD-10-CM

## 2024-01-24 DIAGNOSIS — E78.2 MIXED HYPERLIPIDEMIA: ICD-10-CM

## 2024-01-24 DIAGNOSIS — I10 PRIMARY HYPERTENSION: ICD-10-CM

## 2024-01-24 NOTE — PROGRESS NOTES
Subjective:     Encounter Date:01/24/2024      Patient ID: Mayito Bassett is a 53 y.o. male     Chief Complaint:  Congestive Heart Failure  Associated symptoms include shortness of breath. Pertinent negatives include no abdominal pain, chest pain, near-syncope or palpitations.     Patient presents today to establish care back with cardiology. Of note he was seen by myself and Dr. Allred in 2022. He came to see Dr. Alrled in July of 2022 for bradycardia and abnormal EKG. He had a holter monitor which showed average heart rate of 57 and brief runs of PAT. He had a stress echo which was low risk but LVEF was estimated to be 41-45%. At last OV here he had an echo ordered- however he did not have this completed. He then moved to [place] and established care with cardiology there. He had a heart cath in Decmber 2022 with [place] cardiology with showed mil nonobstructive disease. He denies having an echo at [place]. He saw Dr. Ospina 1/22/24 who started him on Entresto and Aldactone and stopped ARB. He ordered an echo and referred him to cardiology. He started Entresto yesterday after stopping Valsartan. Overall he is stable. Denies volume overload. He has chronic shortness of breath but he associates this with smoking. He has seizures and previously followed with neurology in [place]. He has GERD, Hyperlipidemia, Hypertension, Obesity and sinus bradycardia.     The following portions of the patient's history were reviewed and updated as appropriate: allergies, current medications, past medical history, past social history, past and problem list.    No Known Allergies    Current Outpatient Medications:     aspirin 81 MG EC tablet, Take 1 tablet by mouth Daily., Disp: 90 tablet, Rfl: 3    cyclobenzaprine (FLEXERIL) 10 MG tablet, Take 1 tablet by mouth At Night As Needed for Muscle Spasms., Disp: 30 tablet, Rfl: 5    famotidine (PEPCID) 20 MG tablet, Take 1 tablet by mouth 2 (Two) Times a Day., Disp: 180  tablet, Rfl: 1    levETIRAcetam (KEPPRA) 1000 MG tablet, Take 1 tablet by mouth 2 (Two) Times a Day., Disp: 60 tablet, Rfl: 5    Multiple Vitamin (MULTI VITAMIN MENS PO), Take  by mouth., Disp: , Rfl:     polyethylene glycol (MIRALAX) 17 GM/SCOOP powder, Take 17 g by mouth Daily., Disp: 510 g, Rfl: 0    sacubitril-valsartan (Entresto) 24-26 MG tablet, Take 1 tablet by mouth 2 (Two) Times a Day., Disp: 60 tablet, Rfl: 11    sennosides-docusate (senna-docusate sodium) 8.6-50 MG per tablet, Take 1 tablet by mouth 2 (Two) Times a Day As Needed for Constipation., Disp: 60 tablet, Rfl: 0    spironolactone (ALDACTONE) 25 MG tablet, Take 1 tablet by mouth Daily., Disp: 30 tablet, Rfl: 11    traZODone (DESYREL) 100 MG tablet, Take one tablet at bedtime, Disp: 90 tablet, Rfl: 1    zonisamide (ZONEGRAN) 100 MG capsule, Take 3 capsules by mouth Every Night., Disp: 90 capsule, Rfl: 5    Social History     Socioeconomic History    Marital status: Single   Tobacco Use    Smoking status: Every Day     Packs/day: 1.00     Years: 37.00     Additional pack years: 0.00     Total pack years: 37.00     Types: Cigarettes    Smokeless tobacco: Never   Vaping Use    Vaping Use: Never used   Substance and Sexual Activity    Alcohol use: Yes     Comment: 1 A MONTH    Drug use: Never     Types: Marijuana     Comment: YEARS AGO    Sexual activity: Not Currently     Partners: Female     Birth control/protection: None       Review of Systems   Constitutional: Positive for malaise/fatigue. Negative for chills, decreased appetite, fever, weight gain and weight loss.   HENT:  Negative for nosebleeds.    Eyes:  Negative for visual disturbance.   Cardiovascular:  Positive for dyspnea on exertion. Negative for chest pain, leg swelling, near-syncope, orthopnea, palpitations, paroxysmal nocturnal dyspnea and syncope.   Respiratory:  Positive for shortness of breath. Negative for cough, hemoptysis and snoring.    Endocrine: Negative for cold intolerance  and heat intolerance.   Hematologic/Lymphatic: Negative for bleeding problem. Does not bruise/bleed easily.   Skin:  Negative for rash.   Musculoskeletal:  Negative for back pain and falls.   Gastrointestinal:  Negative for abdominal pain, constipation, diarrhea, heartburn, melena, nausea and vomiting.   Genitourinary:  Negative for hematuria.   Neurological:  Negative for dizziness, headaches and light-headedness.   Psychiatric/Behavioral:  Negative for altered mental status.    Allergic/Immunologic: Negative for persistent infections.              Objective:     Constitutional:       Appearance: Healthy appearance. Well-developed and not in distress.   Eyes:      Pupils: Pupils are equal, round, and reactive to light.   HENT:      Head: Normocephalic and atraumatic.   Neck:      Vascular: No carotid bruit or JVD.   Pulmonary:      Effort: Pulmonary effort is normal.      Breath sounds: Normal breath sounds.   Cardiovascular:      Bradycardia present. Regular rhythm.   Pulses:     Intact distal pulses.   Edema:     Peripheral edema absent.   Abdominal:      General: Bowel sounds are normal.      Palpations: Abdomen is soft.   Musculoskeletal: Normal range of motion.      Cervical back: Normal range of motion and neck supple. Skin:     General: Skin is warm and dry.   Neurological:      Mental Status: Alert and oriented to person, place, and time.      Deep Tendon Reflexes: Reflexes are normal and symmetric.   Psychiatric:         Behavior: Behavior normal.         Thought Content: Thought content normal.         Judgment: Judgment normal.             ECG 12 Lead    Date/Time: 1/24/2024 10:45 AM  Performed by: Angel Norris APRN    Authorized by: Angel Norris APRN  Comparison: compared with previous ECG from 4/18/2023  Similar to previous ECG  Rhythm: sinus bradycardia  Other findings: left ventricular hypertrophy        /75 (BP Location: Right arm, Patient Position: Sitting, Cuff Size: Adult)   Pulse  "55   Resp 18   Ht 170.2 cm (67\")   Wt 105 kg (232 lb)   SpO2 98%   BMI 36.34 kg/m²   Lab Review:   I have reviewed       Lab Results   Component Value Date    CHOL 171 11/03/2023    TRIG 200 (H) 11/03/2023    HDL 40 11/03/2023    LDL 97 11/03/2023      Results for orders placed in visit on 07/27/22    Adult Stress Echo W/ Cont or Stress Agent if Necessary Per Protocol    Interpretation Summary  · Left ventricular ejection fraction appears to be 41 - 45%. Left ventricular systolic function is moderately decreased.     Assessment:          Diagnosis Plan   1. Chronic systolic (congestive) heart failure        2. Bradycardia, sinus        3. Mixed hyperlipidemia        4. Nonobstructive atherosclerosis of coronary artery        5. Primary hypertension        6. Tobacco abuse               Plan:       Chronic systolic congestive heart failure- LVEF on stress echo in July 2022 41-45%. Has never had an echo that I am aware of. One was ordered Monday by Dr. Ospina. Dr. Ospina started Entresto and aldactone Monday. Plan for BMP next week. Low Salt Diet. No overt heart failure. Chronic shortness of breath.   Sinus bradycardia- asymptomatic. Not on beta blocker.  Mixed Hyperlipidemia- on statin. LDL 97. Consider high intensity statin   Nonobstructive Coronary Artery Disease- on cath in December 2022. On Aspirin and statin. No agnina.   Hypertension - blood pressure stable.     Mayito Martin Serjio  reports that he has been smoking cigarettes. He has a 37.00 pack-year smoking history. He has never used smokeless tobacco.. I have educated him on the risk of diseases from using tobacco products such as cancer, COPD, and heart disease.     I advised him to quit and he is not willing to quit.    I spent 5 minutes counseling the patient.     Follow up in 2-3 weeks. BMP next week. Echo pending.     "

## 2024-01-30 DIAGNOSIS — K59.00 CONSTIPATION, UNSPECIFIED CONSTIPATION TYPE: ICD-10-CM

## 2024-01-30 DIAGNOSIS — G25.5 MUSCLE, JERKY MOVEMENTS (UNCONTROLLED): ICD-10-CM

## 2024-01-30 DIAGNOSIS — I50.22 CHRONIC SYSTOLIC (CONGESTIVE) HEART FAILURE: ICD-10-CM

## 2024-01-30 DIAGNOSIS — R53.83 FATIGUE, UNSPECIFIED TYPE: ICD-10-CM

## 2024-01-30 RX ORDER — SPIRONOLACTONE 25 MG/1
25 TABLET ORAL DAILY
Qty: 30 TABLET | Refills: 0 | OUTPATIENT
Start: 2024-01-30

## 2024-01-30 RX ORDER — CYCLOBENZAPRINE HCL 10 MG
10 TABLET ORAL NIGHTLY PRN
Qty: 30 TABLET | Refills: 5 | Status: SHIPPED | OUTPATIENT
Start: 2024-01-30

## 2024-02-01 DIAGNOSIS — K21.9 GASTROESOPHAGEAL REFLUX DISEASE, UNSPECIFIED WHETHER ESOPHAGITIS PRESENT: ICD-10-CM

## 2024-02-01 RX ORDER — FAMOTIDINE 20 MG/1
20 TABLET, FILM COATED ORAL 2 TIMES DAILY
Qty: 180 TABLET | Refills: 1 | Status: SHIPPED | OUTPATIENT
Start: 2024-02-01

## 2024-02-01 NOTE — TELEPHONE ENCOUNTER
Caller: ANAHY HUGHES    Relationship: PATIENT    Best call back number [phone]    Requested Prescriptions: [phone]     [facility]    Pharmacy where request should be sent:      [address] [phone] PH - [phone]     Last office visit with prescribing clinician: 1/22/2024   Last telemedicine visit with prescribing clinician: Visit date not found   Next office visit with prescribing clinician: 2/19/2024       Does the patient have less than a 3 day supply:  [x] Yes  [] No      Hunter Cooley Rep   02/01/24 12:39 CST

## 2024-02-07 ENCOUNTER — LAB (OUTPATIENT)
Dept: LAB | Facility: HOSPITAL | Age: 54
End: 2024-02-07
Payer: COMMERCIAL

## 2024-02-07 DIAGNOSIS — I50.22 CHRONIC SYSTOLIC (CONGESTIVE) HEART FAILURE: ICD-10-CM

## 2024-02-07 DIAGNOSIS — G40.909 NONINTRACTABLE EPILEPSY WITHOUT STATUS EPILEPTICUS, UNSPECIFIED EPILEPSY TYPE: ICD-10-CM

## 2024-02-07 DIAGNOSIS — I25.10 CORONARY ARTERY DISEASE INVOLVING NATIVE HEART WITHOUT ANGINA PECTORIS, UNSPECIFIED VESSEL OR LESION TYPE: ICD-10-CM

## 2024-02-07 LAB
ALBUMIN SERPL-MCNC: 4.3 G/DL (ref 3.5–5.2)
ALBUMIN/GLOB SERPL: 1.6 G/DL
ALP SERPL-CCNC: 103 U/L (ref 39–117)
ALT SERPL W P-5'-P-CCNC: 18 U/L (ref 1–41)
ANION GAP SERPL CALCULATED.3IONS-SCNC: 9 MMOL/L (ref 5–15)
AST SERPL-CCNC: 14 U/L (ref 1–40)
BASOPHILS # BLD AUTO: 0.03 10*3/MM3 (ref 0–0.2)
BASOPHILS NFR BLD AUTO: 0.4 % (ref 0–1.5)
BILIRUB SERPL-MCNC: 0.2 MG/DL (ref 0–1.2)
BUN SERPL-MCNC: 16 MG/DL (ref 6–20)
BUN/CREAT SERPL: 16.8 (ref 7–25)
CALCIUM SPEC-SCNC: 9.5 MG/DL (ref 8.6–10.5)
CHLORIDE SERPL-SCNC: 105 MMOL/L (ref 98–107)
CHOLEST SERPL-MCNC: 134 MG/DL (ref 0–200)
CO2 SERPL-SCNC: 25 MMOL/L (ref 22–29)
CREAT SERPL-MCNC: 0.95 MG/DL (ref 0.76–1.27)
DEPRECATED RDW RBC AUTO: 43.7 FL (ref 37–54)
EGFRCR SERPLBLD CKD-EPI 2021: 95.1 ML/MIN/1.73
EOSINOPHIL # BLD AUTO: 0.09 10*3/MM3 (ref 0–0.4)
EOSINOPHIL NFR BLD AUTO: 1.2 % (ref 0.3–6.2)
ERYTHROCYTE [DISTWIDTH] IN BLOOD BY AUTOMATED COUNT: 12.5 % (ref 12.3–15.4)
GLOBULIN UR ELPH-MCNC: 2.7 GM/DL
GLUCOSE SERPL-MCNC: 133 MG/DL (ref 65–99)
HCT VFR BLD AUTO: 46 % (ref 37.5–51)
HDLC SERPL-MCNC: 40 MG/DL (ref 40–60)
HGB BLD-MCNC: 16.1 G/DL (ref 13–17.7)
IMM GRANULOCYTES # BLD AUTO: 0.05 10*3/MM3 (ref 0–0.05)
IMM GRANULOCYTES NFR BLD AUTO: 0.7 % (ref 0–0.5)
LDLC SERPL CALC-MCNC: 70 MG/DL (ref 0–100)
LDLC/HDLC SERPL: 1.67 {RATIO}
LYMPHOCYTES # BLD AUTO: 2.3 10*3/MM3 (ref 0.7–3.1)
LYMPHOCYTES NFR BLD AUTO: 31.2 % (ref 19.6–45.3)
MCH RBC QN AUTO: 33.1 PG (ref 26.6–33)
MCHC RBC AUTO-ENTMCNC: 35 G/DL (ref 31.5–35.7)
MCV RBC AUTO: 94.5 FL (ref 79–97)
MONOCYTES # BLD AUTO: 0.39 10*3/MM3 (ref 0.1–0.9)
MONOCYTES NFR BLD AUTO: 5.3 % (ref 5–12)
NEUTROPHILS NFR BLD AUTO: 4.52 10*3/MM3 (ref 1.7–7)
NEUTROPHILS NFR BLD AUTO: 61.2 % (ref 42.7–76)
NRBC BLD AUTO-RTO: 0 /100 WBC (ref 0–0.2)
PLATELET # BLD AUTO: 144 10*3/MM3 (ref 140–450)
PMV BLD AUTO: 10.7 FL (ref 6–12)
POTASSIUM SERPL-SCNC: 3.7 MMOL/L (ref 3.5–5.2)
PROT SERPL-MCNC: 7 G/DL (ref 6–8.5)
RBC # BLD AUTO: 4.87 10*6/MM3 (ref 4.14–5.8)
SODIUM SERPL-SCNC: 139 MMOL/L (ref 136–145)
TRIGL SERPL-MCNC: 136 MG/DL (ref 0–150)
VLDLC SERPL-MCNC: 24 MG/DL (ref 5–40)
WBC NRBC COR # BLD AUTO: 7.38 10*3/MM3 (ref 3.4–10.8)

## 2024-02-07 PROCEDURE — 85025 COMPLETE CBC W/AUTO DIFF WBC: CPT

## 2024-02-07 PROCEDURE — 36415 COLL VENOUS BLD VENIPUNCTURE: CPT

## 2024-02-07 PROCEDURE — 80053 COMPREHEN METABOLIC PANEL: CPT

## 2024-02-07 PROCEDURE — 80177 DRUG SCRN QUAN LEVETIRACETAM: CPT

## 2024-02-07 PROCEDURE — 80061 LIPID PANEL: CPT

## 2024-02-09 ENCOUNTER — TELEPHONE (OUTPATIENT)
Dept: NEUROLOGY | Facility: CLINIC | Age: 54
End: 2024-02-09
Payer: COMMERCIAL

## 2024-02-09 LAB — LEVETIRACETAM SERPL-MCNC: 32.6 UG/ML (ref 10–40)

## 2024-02-09 NOTE — TELEPHONE ENCOUNTER
Called patient and left  with results.      ----- Message from Dimitri Rivera DNP, NITHIN sent at 2/9/2024  9:27 AM EST -----  Please notify pt keppra level, blood counts, kidney and liver function are normal. Glucose is slightly elevated at 133.

## 2024-02-16 DIAGNOSIS — G40.909 NONINTRACTABLE EPILEPSY WITHOUT STATUS EPILEPTICUS, UNSPECIFIED EPILEPSY TYPE: ICD-10-CM

## 2024-02-19 ENCOUNTER — OFFICE VISIT (OUTPATIENT)
Dept: FAMILY MEDICINE CLINIC | Facility: CLINIC | Age: 54
End: 2024-02-19
Payer: COMMERCIAL

## 2024-02-19 VITALS
HEIGHT: 67 IN | BODY MASS INDEX: 37.2 KG/M2 | TEMPERATURE: 97 F | HEART RATE: 55 BPM | RESPIRATION RATE: 18 BRPM | DIASTOLIC BLOOD PRESSURE: 80 MMHG | SYSTOLIC BLOOD PRESSURE: 134 MMHG | WEIGHT: 237 LBS | OXYGEN SATURATION: 99 %

## 2024-02-19 DIAGNOSIS — I50.22 CHRONIC SYSTOLIC (CONGESTIVE) HEART FAILURE: Primary | ICD-10-CM

## 2024-02-19 DIAGNOSIS — Z72.0 TOBACCO ABUSE: ICD-10-CM

## 2024-02-19 PROCEDURE — 1160F RVW MEDS BY RX/DR IN RCRD: CPT | Performed by: FAMILY MEDICINE

## 2024-02-19 PROCEDURE — 1159F MED LIST DOCD IN RCRD: CPT | Performed by: FAMILY MEDICINE

## 2024-02-19 PROCEDURE — 99214 OFFICE O/P EST MOD 30 MIN: CPT | Performed by: FAMILY MEDICINE

## 2024-02-19 RX ORDER — ZONISAMIDE 100 MG/1
CAPSULE ORAL
Qty: 90 CAPSULE | Refills: 0 | OUTPATIENT
Start: 2024-02-19

## 2024-02-19 NOTE — PROGRESS NOTES
"Chief Complaint  Follow-up (4 week follow up - pt stated doing ok with no complaints )    Subjective        Mayito Bassett presents to [facility]  History of Present Illness  Blood pressure is stable and patient currently feels well on current regimen  Reports increased urination at times with no history of diabetes, CMP 12 days ago had normal electrolytes but glucose of 133  No shortness of breath or chest pain  Working on cutting back to 1 pack/day    Objective   Vital Signs:  /80   Pulse 55   Temp 97 °F (36.1 °C)   Resp 18   Ht 170.2 cm (67\")   Wt 108 kg (237 lb)   SpO2 99%   BMI 37.12 kg/m²   Estimated body mass index is 37.12 kg/m² as calculated from the following:    Height as of this encounter: 170.2 cm (67\").    Weight as of this encounter: 108 kg (237 lb).               Physical Exam  Vitals and nursing note reviewed.   Constitutional:       General: He is not in acute distress.     Appearance: He is not diaphoretic.   HENT:      Head: Normocephalic and atraumatic.      Nose: Nose normal.   Eyes:      General: No scleral icterus.        Right eye: No discharge.         Left eye: No discharge.      Conjunctiva/sclera: Conjunctivae normal.   Neck:      Trachea: No tracheal deviation.   Pulmonary:      Effort: Pulmonary effort is normal.   Skin:     General: Skin is warm and dry.      Coloration: Skin is not pale.   Neurological:      Mental Status: He is alert and oriented to person, place, and time.   Psychiatric:         Behavior: Behavior normal.         Thought Content: Thought content normal.         Judgment: Judgment normal.        Result Review :                     Assessment and Plan     Diagnoses and all orders for this visit:    1. Chronic systolic (congestive) heart failure (Primary)  -     dapagliflozin Propanediol 10 MG tablet; Take 10 mg by mouth Daily.  Dispense: 90 tablet; Refill: 3    2. Tobacco abuse    Add Farxiga to spironolactone and " Entresto  Encouraged tobacco cessation  Follow-up 3 months

## 2024-02-19 NOTE — TELEPHONE ENCOUNTER
Rx Refill Note  Requested Prescriptions     Pending Prescriptions Disp Refills    zonisamide (ZONEGRAN) 100 MG capsule [Pharmacy Med Name: Zonisamide 100 MG Oral Capsule] 90 capsule 0     Sig: TAKE 3 CAPSULES BY MOUTH ONCE DAILY AT BEDTIME FOR  PREVENTION  OF  SEIZURES      Last filled: 1/18/24 with 5 RF  Last office visit with prescribing clinician: 1/25/2023      Next office visit with prescribing clinician: Visit date not found     Shena Chang MA  02/19/24, 09:19 EST

## 2024-02-27 ENCOUNTER — HOSPITAL ENCOUNTER (OUTPATIENT)
Dept: CARDIOLOGY | Facility: HOSPITAL | Age: 54
Discharge: HOME | End: 2024-02-27
Admitting: FAMILY MEDICINE
Payer: COMMERCIAL

## 2024-02-27 DIAGNOSIS — I50.22 CHRONIC SYSTOLIC (CONGESTIVE) HEART FAILURE: ICD-10-CM

## 2024-02-27 PROCEDURE — 93306 TTE W/DOPPLER COMPLETE: CPT

## 2024-02-27 PROCEDURE — 25510000001 PERFLUTREN 6.52 MG/ML SUSPENSION: Performed by: FAMILY MEDICINE

## 2024-02-27 RX ADMIN — PERFLUTREN 1.17 MG: 6.52 INJECTION, SUSPENSION INTRAVENOUS at 13:27

## 2024-02-29 ENCOUNTER — OFFICE VISIT (OUTPATIENT)
Dept: CARDIOLOGY | Facility: CLINIC | Age: 54
End: 2024-02-29
Payer: COMMERCIAL

## 2024-02-29 VITALS
SYSTOLIC BLOOD PRESSURE: 125 MMHG | OXYGEN SATURATION: 99 % | BODY MASS INDEX: 36.88 KG/M2 | HEART RATE: 68 BPM | WEIGHT: 235 LBS | DIASTOLIC BLOOD PRESSURE: 75 MMHG | HEIGHT: 67 IN | RESPIRATION RATE: 18 BRPM

## 2024-02-29 DIAGNOSIS — I25.10 NONOBSTRUCTIVE ATHEROSCLEROSIS OF CORONARY ARTERY: ICD-10-CM

## 2024-02-29 DIAGNOSIS — I10 ESSENTIAL HYPERTENSION: ICD-10-CM

## 2024-02-29 DIAGNOSIS — E78.2 MIXED HYPERLIPIDEMIA: ICD-10-CM

## 2024-02-29 DIAGNOSIS — R00.1 BRADYCARDIA, SINUS: ICD-10-CM

## 2024-02-29 DIAGNOSIS — Z72.0 TOBACCO ABUSE: ICD-10-CM

## 2024-02-29 DIAGNOSIS — I50.22 CHRONIC SYSTOLIC (CONGESTIVE) HEART FAILURE: Primary | ICD-10-CM

## 2024-02-29 LAB
BH CV ECHO MEAS - AO MAX PG: 10.8 MMHG
BH CV ECHO MEAS - AO MEAN PG: 5.5 MMHG
BH CV ECHO MEAS - AO ROOT DIAM: 2.8 CM
BH CV ECHO MEAS - AO V2 MAX: 164 CM/SEC
BH CV ECHO MEAS - AO V2 VTI: 32.4 CM
BH CV ECHO MEAS - AVA(I,D): 2.7 CM2
BH CV ECHO MEAS - EDV(CUBED): 132.7 ML
BH CV ECHO MEAS - EDV(MOD-SP2): 163 ML
BH CV ECHO MEAS - EDV(MOD-SP4): 208 ML
BH CV ECHO MEAS - EF(MOD-BP): 45.2 %
BH CV ECHO MEAS - EF(MOD-SP2): 40.2 %
BH CV ECHO MEAS - EF(MOD-SP4): 49.5 %
BH CV ECHO MEAS - ESV(CUBED): 64 ML
BH CV ECHO MEAS - ESV(MOD-SP2): 97.5 ML
BH CV ECHO MEAS - ESV(MOD-SP4): 105 ML
BH CV ECHO MEAS - FS: 21.6 %
BH CV ECHO MEAS - IVS/LVPW: 1.1 CM
BH CV ECHO MEAS - IVSD: 1.1 CM
BH CV ECHO MEAS - LA DIMENSION: 4.3 CM
BH CV ECHO MEAS - LAT PEAK E' VEL: 12.5 CM/SEC
BH CV ECHO MEAS - LV DIASTOLIC VOL/BSA (35-75): 95.7 CM2
BH CV ECHO MEAS - LV MASS(C)D: 200.8 GRAMS
BH CV ECHO MEAS - LV MAX PG: 3.9 MMHG
BH CV ECHO MEAS - LV MEAN PG: 2 MMHG
BH CV ECHO MEAS - LV SYSTOLIC VOL/BSA (12-30): 48.3 CM2
BH CV ECHO MEAS - LV V1 MAX: 98.4 CM/SEC
BH CV ECHO MEAS - LV V1 VTI: 22.7 CM
BH CV ECHO MEAS - LVIDD: 5.1 CM
BH CV ECHO MEAS - LVIDS: 4 CM
BH CV ECHO MEAS - LVOT AREA: 3.8 CM2
BH CV ECHO MEAS - LVOT DIAM: 2.2 CM
BH CV ECHO MEAS - LVPWD: 1 CM
BH CV ECHO MEAS - MED PEAK E' VEL: 5.7 CM/SEC
BH CV ECHO MEAS - MV A MAX VEL: 64 CM/SEC
BH CV ECHO MEAS - MV DEC TIME: 0.32 SEC
BH CV ECHO MEAS - MV E MAX VEL: 66.9 CM/SEC
BH CV ECHO MEAS - MV E/A: 1.05
BH CV ECHO MEAS - SI(MOD-SP2): 30.1 ML/M2
BH CV ECHO MEAS - SI(MOD-SP4): 47.4 ML/M2
BH CV ECHO MEAS - SV(LVOT): 86.3 ML
BH CV ECHO MEAS - SV(MOD-SP2): 65.5 ML
BH CV ECHO MEAS - SV(MOD-SP4): 103 ML
BH CV ECHO MEAS - TR MAX PG: 18.3 MMHG
BH CV ECHO MEAS - TR MAX VEL: 214 CM/SEC
BH CV ECHO MEASUREMENTS AVERAGE E/E' RATIO: 7.35
LEFT ATRIUM VOLUME INDEX: 35.4 ML/M2
LEFT ATRIUM VOLUME: 76.8 ML

## 2024-02-29 NOTE — PROGRESS NOTES
Subjective:     Encounter Date:02/29/2024      Patient ID: Mayito Bassett is a 53 y.o. male     Chief Complaint:  Congestive Heart Failure  Presents for follow-up visit. Associated symptoms include shortness of breath. Pertinent negatives include no abdominal pain, chest pain, near-syncope or palpitations.     Patient presents today for follow up for congestive heart failure, sinus bradycardia, nonobstructive coronary artery disease. Patient was seen last month in order to reestablish care with [facility]. He was following with [facility] team. He was previously seen by Dr. Allred in 2022.  Of note he came to see Dr. Allred in July of 2022 for bradycardia and abnormal EKG. He had a holter monitor which showed average heart rate of 57 and brief runs of PAT. He had a stress echo which was low risk but LVEF was estimated to be 41-45%. He had a heart cath in Decmber 2022 with [place] [facility] with showed mild nonobstructive disease. He saw Dr. Ospina, his local PCP, 1/22/24 who started him on Entresto and Aldactone and stopped ARB. Patient has seizures and previously followed with neurology in [place]. He has GERD, hyperlipidemia, hypertension, obesity and sinus bradycardia. Patient had an echo this week which showed LVEF 41-45%, RV moderately dilated, LA mildly dilated, trace MR. Overall patient is stable. He has tolerated this medicines well. Chronic shortness of breath. He continues to smoke roughly 1 pack per day but this is significantly reduced from 2-2.5 packs per day.     The following portions of the patient's history were reviewed and updated as appropriate: allergies, current medications, past medical history, past social history, past and problem list.    No Known Allergies    Current Outpatient Medications:     aspirin 81 MG EC tablet, Take 1 tablet by mouth Daily., Disp: 90 tablet, Rfl: 3    cyclobenzaprine (FLEXERIL) 10 MG tablet, Take 1 tablet by  mouth At Night As Needed for Muscle Spasms., Disp: 30 tablet, Rfl: 5    dapagliflozin Propanediol 10 MG tablet, Take 10 mg by mouth Daily., Disp: 90 tablet, Rfl: 3    famotidine (PEPCID) 20 MG tablet, Take 1 tablet by mouth 2 (Two) Times a Day., Disp: 180 tablet, Rfl: 1    levETIRAcetam (KEPPRA) 1000 MG tablet, Take 1 tablet by mouth 2 (Two) Times a Day., Disp: 60 tablet, Rfl: 5    Multiple Vitamin (MULTI VITAMIN MENS PO), Take  by mouth., Disp: , Rfl:     polyethylene glycol (MIRALAX) 17 GM/SCOOP powder, Take 17 g by mouth Daily., Disp: 510 g, Rfl: 0    sennosides-docusate (senna-docusate sodium) 8.6-50 MG per tablet, Take 1 tablet by mouth 2 (Two) Times a Day As Needed for Constipation., Disp: 60 tablet, Rfl: 0    spironolactone (ALDACTONE) 25 MG tablet, Take 1 tablet by mouth Daily., Disp: 30 tablet, Rfl: 11    traZODone (DESYREL) 100 MG tablet, Take one tablet at bedtime, Disp: 90 tablet, Rfl: 1    zonisamide (ZONEGRAN) 100 MG capsule, Take 3 capsules by mouth Every Night., Disp: 90 capsule, Rfl: 5    sacubitril-valsartan (ENTRESTO) 49-51 MG tablet, Take 1 tablet by mouth 2 (Two) Times a Day., Disp: 60 tablet, Rfl: 11    Social History     Socioeconomic History    Marital status: Single   Tobacco Use    Smoking status: Every Day     Packs/day: 1.00     Years: 37.00     Additional pack years: 0.00     Total pack years: 37.00     Types: Cigarettes    Smokeless tobacco: Never   Vaping Use    Vaping Use: Never used   Substance and Sexual Activity    Alcohol use: Yes     Comment: 1 A MONTH    Drug use: Never     Types: Marijuana     Comment: YEARS AGO    Sexual activity: Not Currently     Partners: Female     Birth control/protection: None       Review of Systems   Constitutional: Positive for malaise/fatigue. Negative for chills, decreased appetite, fever, weight gain and weight loss.   HENT:  Negative for nosebleeds.    Eyes:  Negative for visual disturbance.   Cardiovascular:  Positive for dyspnea on exertion.  Negative for chest pain, leg swelling, near-syncope, orthopnea, palpitations, paroxysmal nocturnal dyspnea and syncope.   Respiratory:  Positive for shortness of breath. Negative for cough, hemoptysis and snoring.    Endocrine: Negative for cold intolerance and heat intolerance.   Hematologic/Lymphatic: Negative for bleeding problem. Does not bruise/bleed easily.   Skin:  Negative for rash.   Musculoskeletal:  Negative for back pain and falls.   Gastrointestinal:  Negative for abdominal pain, constipation, diarrhea, heartburn, melena, nausea and vomiting.   Genitourinary:  Negative for hematuria.   Neurological:  Negative for dizziness, headaches and light-headedness.   Psychiatric/Behavioral:  Negative for altered mental status.    Allergic/Immunologic: Negative for persistent infections.              Objective:     Constitutional:       Appearance: Healthy appearance. Well-developed and not in distress.   Eyes:      Pupils: Pupils are equal, round, and reactive to light.   HENT:      Head: Normocephalic and atraumatic.   Neck:      Vascular: No carotid bruit or JVD.   Pulmonary:      Effort: Pulmonary effort is normal.      Breath sounds: Normal breath sounds.   Cardiovascular:      Bradycardia present. Regular rhythm.      Murmurs: There is no murmur.   Pulses:     Intact distal pulses.   Edema:     Peripheral edema absent.   Abdominal:      General: Bowel sounds are normal.      Palpations: Abdomen is soft.   Musculoskeletal: Normal range of motion.      Cervical back: Normal range of motion and neck supple. Skin:     General: Skin is warm and dry.   Neurological:      Mental Status: Alert and oriented to person, place, and time.      Deep Tendon Reflexes: Reflexes are normal and symmetric.   Psychiatric:         Behavior: Behavior normal.         Thought Content: Thought content normal.         Judgment: Judgment normal.           Procedures  /75 (BP Location: Right arm, Patient Position: Sitting, Cuff  "Size: Adult)   Pulse 68   Resp 18   Ht 170.2 cm (67\")   Wt 107 kg (235 lb)   SpO2 99%   BMI 36.81 kg/m²   Lab Review:   I have reviewed   Lab Results   Component Value Date    GLUCOSE 133 (H) 02/07/2024    CALCIUM 9.5 02/07/2024     02/07/2024    K 3.7 02/07/2024    CO2 25.0 02/07/2024     02/07/2024    BUN 16 02/07/2024    CREATININE 0.95 02/07/2024    EGFR 95.1 02/07/2024    BCR 16.8 02/07/2024    ANIONGAP 9.0 02/07/2024          Lab Results   Component Value Date    CHOL 134 02/07/2024    TRIG 136 02/07/2024    HDL 40 02/07/2024    LDL 70 02/07/2024      Results for orders placed during the hospital encounter of 02/27/24    Adult Transthoracic Echo Complete W/ Cont if Necessary Per Protocol    Interpretation Summary    Left ventricular systolic function is mildly decreased. Left ventricular ejection fraction appears to be 41 - 45%.    The right ventricular cavity is moderately dilated. Normal right ventricular systolic function noted.    The left atrial cavity is mildly dilated.    Trace mitral valve regurgitation is present.    The following segments are hypokinetic: apical anterior, apical septal, apical inferior, apical lateral and apex.     Assessment:          Diagnosis Plan   1. Chronic systolic (congestive) heart failure  Basic Metabolic Panel      2. Bradycardia, sinus        3. Mixed hyperlipidemia        4. Nonobstructive atherosclerosis of coronary artery        5. Essential hypertension        6. Tobacco abuse                 Plan:       Chronic Systolic Congestive Heart Failure - He has been started on Farxiga, Aldactone, Entresto by his PCP. LVEF remains 41-45%. Will increase Entresto. Low Salt diet.   Sinus Bradycardia - stable. Will avoid beta blocker for now  Mixed Hyperlipidemia - managed by PCP. On Statin.   Nonobstructive Coronary Artery Disease - cath in December 2022 at [place] for drop in LVEF and abnormal stress. Showed mild coronary artery disease.   Hypertension - " blood pressure controlled    Mayito Bassett  reports that he has been smoking cigarettes. He has a 37.00 pack-year smoking history. He has never used smokeless tobacco.. I have educated him on the risk of diseases from using tobacco products such as cancer, COPD, and heart disease.     I advised him to quit and he is willing to quit. We have discussed the following method/s for tobacco cessation:  Education Material Counseling.  Together we have set a quit date for 1 month from today.  He will follow up with me in  1  month or sooner to check on his progress.    I spent 5 minutes counseling the patient.    Will follow with Dr. Luong with Dr. Allred         Increase Entresto. BMP in 2 weeks.

## 2024-03-19 ENCOUNTER — OFFICE VISIT (OUTPATIENT)
Dept: NEUROLOGY | Facility: CLINIC | Age: 54
End: 2024-03-19
Payer: COMMERCIAL

## 2024-03-19 VITALS
RESPIRATION RATE: 18 BRPM | HEART RATE: 72 BPM | BODY MASS INDEX: 36.88 KG/M2 | DIASTOLIC BLOOD PRESSURE: 68 MMHG | WEIGHT: 235 LBS | SYSTOLIC BLOOD PRESSURE: 122 MMHG | HEIGHT: 67 IN

## 2024-03-19 DIAGNOSIS — G40.909 NONINTRACTABLE EPILEPSY WITHOUT STATUS EPILEPTICUS, UNSPECIFIED EPILEPSY TYPE: Primary | ICD-10-CM

## 2024-03-19 NOTE — PROGRESS NOTES
Neurology Consult Note    [facility]  [address], [address]  [place] [address]  Phone: [phone]  Fax: [phone]    Referring Provider:   Donald Ospina DO  Primary Care Provider:  Donald Ospina DO    Reason for Consult:  Seizure disorder  Subjective        Mayito Bassett presents to [facility]    History of Present Illness  53-year-old male referred to our clinic by Donald Ospina DO for the evaluation of seizure disorder.  Review of his record shows patient saw Dr. Ospina on 1/22/2024 for establishment of care.  Patient requested referral to neurology for history of seizure.  Patient currently is maintained on Keppra 1000 mg twice daily as well as Zonegran 300 mg nightly.  He had been this regimen for approximately 2 years.    Patient saw [facility] neurology on 1/18/2024.  Patient was seen via telemedicine.  His seizure descriptions include a generalized tonic-clonic seizure.  Patient had a blank stare.  He can become violent.  He has experienced tongue biting, urinary and bowel incontinence.  Confused when he awakens.  Last EEG did show frontal sharp waves.    Today patient presents by himself.  He reports to me approximately January 2015 or 2016 he developed seizures.  He reports he was working as a  in the [employer] when he was talking to his son on the phone and the next thing he remembered he was at the [employer] table.  He was then being taken to the hospital per EMS.  It is reported in 2021 he was admitted at the [facility] due to seizures.  He was taken off his medicines and observed.  While admitted he did have a seizure.  At that time he was started on Keppra 1000 mg twice daily as well as Zonegran 300 mg nightly.  Since then he has been seizure-free.  He has noticed episodes when he can zoning off however once my calls his name he immediately reorients.  It does not happen very often.   Currently he is driving without issues.  He does work at [employer] as a cook.  He denies any concerns or needs at this time.  Patient Active Problem List   Diagnosis    Bradycardia, sinus    EKG abnormalities    Tobacco abuse    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    Low HDL (under 40)    Obesity (BMI 30-39.9)    Dorsalgia    Seizure disorder    Chronic systolic (congestive) heart failure    Annual physical exam    Vitamin D deficiency    Onychomycosis    Gastroesophageal reflux disease    Muscle spasms of lower extremity    Insomnia    Encounter for screening for lung cancer    Nonintractable epilepsy without status epilepticus    Chronic bilateral low back pain without sciatica    Cardiomyopathy, dilated    Morbid obesity with body mass index (BMI) of 40.0 or higher    Upper back pain    Healthcare maintenance    Chronic right-sided low back pain    Preoperative clearance    Immunization due    Constipation    Hand pain, right    Right wrist pain    Muscle spasm    Chest pain        Past Medical History:   Diagnosis Date    Anxiety     Arthritis     Asthma     Cancer     Chronic back pain     Congestive heart failure (CHF)     Depression     EKG abnormalities 07/27/2022    GERD (gastroesophageal reflux disease)     Obesity (BMI 30-39.9)     Seizures     Tendonitis 2023    right wrist and hand, palms and fingers        Social History     Socioeconomic History    Marital status: Single   Tobacco Use    Smoking status: Every Day     Current packs/day: 1.00     Average packs/day: 1 pack/day for 37.0 years (37.0 ttl pk-yrs)     Types: Cigarettes    Smokeless tobacco: Never   Vaping Use    Vaping status: Never Used   Substance and Sexual Activity    Alcohol use: Yes     Comment: 1 A MONTH    Drug use: Never     Types: Marijuana     Comment: YEARS AGO    Sexual activity: Not Currently     Partners: Female     Birth control/protection: None        No Known  "Allergies       Current Outpatient Medications:     aspirin 81 MG EC tablet, Take 1 tablet by mouth Daily., Disp: 90 tablet, Rfl: 3    cyclobenzaprine (FLEXERIL) 10 MG tablet, Take 1 tablet by mouth At Night As Needed for Muscle Spasms., Disp: 30 tablet, Rfl: 5    dapagliflozin Propanediol 10 MG tablet, Take 10 mg by mouth Daily., Disp: 90 tablet, Rfl: 3    famotidine (PEPCID) 20 MG tablet, Take 1 tablet by mouth 2 (Two) Times a Day., Disp: 180 tablet, Rfl: 1    levETIRAcetam (KEPPRA) 1000 MG tablet, Take 1 tablet by mouth 2 (Two) Times a Day., Disp: 60 tablet, Rfl: 5    Multiple Vitamin (MULTI VITAMIN MENS PO), Take  by mouth., Disp: , Rfl:     polyethylene glycol (MIRALAX) 17 GM/SCOOP powder, Take 17 g by mouth Daily., Disp: 510 g, Rfl: 0    sacubitril-valsartan (ENTRESTO) 49-51 MG tablet, Take 1 tablet by mouth 2 (Two) Times a Day., Disp: 60 tablet, Rfl: 11    sennosides-docusate (senna-docusate sodium) 8.6-50 MG per tablet, Take 1 tablet by mouth 2 (Two) Times a Day As Needed for Constipation., Disp: 60 tablet, Rfl: 0    spironolactone (ALDACTONE) 25 MG tablet, Take 1 tablet by mouth Daily., Disp: 30 tablet, Rfl: 11    traZODone (DESYREL) 100 MG tablet, Take one tablet at bedtime, Disp: 90 tablet, Rfl: 1    zonisamide (ZONEGRAN) 100 MG capsule, Take 3 capsules by mouth Every Night., Disp: 90 capsule, Rfl: 5       Objective   Vital Signs:   /68 (BP Location: Left arm, Patient Position: Sitting)   Pulse 72   Resp 18   Ht 170.2 cm (67\")   Wt 107 kg (235 lb)   BMI 36.81 kg/m²       Physical Exam  Vitals and nursing note reviewed.   Constitutional:       Appearance: Normal appearance.   HENT:      Head: Normocephalic.   Eyes:      General: Lids are normal.      Extraocular Movements: Extraocular movements intact.      Pupils: Pupils are equal, round, and reactive to light.   Pulmonary:      Effort: Pulmonary effort is normal. No respiratory distress.   Skin:     General: Skin is warm and dry. "   Neurological:      Mental Status: He is alert.      Motor: Motor strength is normal.     Deep Tendon Reflexes: Reflexes are normal and symmetric.   Psychiatric:         Speech: Speech normal.        Neurological Exam  Mental Status  Alert. Oriented to person, place, time and situation. Speech is normal. Language is fluent with no aphasia.    Cranial Nerves  CN II: Visual fields full to confrontation.  CN III, IV, VI: Extraocular movements intact bilaterally. Normal lids and orbits bilaterally. Pupils equal round and reactive to light bilaterally.  CN V: Facial sensation is normal.  CN VII: Full and symmetric facial movement.  CN IX, X: Palate elevates symmetrically. Normal gag reflex.  CN XI: Shoulder shrug strength is normal.  CN XII: Tongue midline without atrophy or fasciculations.    Motor  Normal muscle bulk throughout. No fasciculations present. Normal muscle tone. No abnormal involuntary movements. Strength is 5/5 throughout all four extremities.    Sensory  Light touch is normal in upper and lower extremities.     Reflexes  Deep tendon reflexes are 2+ and symmetric in all four extremities.    Coordination  Right: Finger-to-nose normal.Left: Finger-to-nose normal.    Gait  Casual gait is normal including stance, stride, and arm swing.      Result Review :   The following data was reviewed by: NITHIN Krueger on 03/19/2024:  CMP          4/10/2023    10:04 11/3/2023    08:30 2/7/2024    10:05   CMP   Glucose 120  114  133    BUN 16  12  16    Creatinine 0.97  0.93  0.95    EGFR 93.3  97.6  95.1    Sodium 140  140  139    Potassium 3.6  4.4  3.7    Chloride 105  106  105    Calcium 9.2  9.5  9.5    Total Protein  7.4  7.0    Albumin  4.6  4.3    Globulin  2.8  2.7    Total Bilirubin  0.3  0.2    Alkaline Phosphatase  89  103    AST (SGOT)  25  14    ALT (SGPT)  37  18    Albumin/Globulin Ratio  1.6  1.6    BUN/Creatinine Ratio 16.5  12.9  16.8    Anion Gap 9.0  10.0  9.0      CBC w/diff           2/7/2024    10:05   CBC w/Diff   WBC 7.38    RBC 4.87    Hemoglobin 16.1    Hematocrit 46.0    MCV 94.5    MCH 33.1    MCHC 35.0    RDW 12.5    Platelets 144    Neutrophil Rel % 61.2    Immature Granulocyte Rel % 0.7    Lymphocyte Rel % 31.2    Monocyte Rel % 5.3    Eosinophil Rel % 1.2    Basophil Rel % 0.4      Lipid Panel          11/3/2023    08:30 2/7/2024    10:05   Lipid Panel   Total Cholesterol 171  134    Triglycerides 200  136    HDL Cholesterol 40  40    VLDL Cholesterol 34  24    LDL Cholesterol  97  70    LDL/HDL Ratio 2.28  1.67        Most Recent A1C          11/3/2023    08:30   HGBA1C Most Recent   Hemoglobin A1C 5.30           EEG Continuous Monitoring (07/27/2021 11:45 EDT)     Progress Notes by Donald Ospina, DO (01/22/2024 14:15)     Progress Notes by Dimitri Rivera, DNP, APRN (01/18/2024 09:00)     I have also reviewed records from neurology care everywhere from [facility].               Impression:  Mayito Bassett is a 53 y.o. male who presents for establishment of care and follow-up of seizure disorder.  Patient has been managed by neurology in the past.  He has been seizure-free for approximately 2 years while taking Keppra and Zonegran.  We will continue current regimen.  Additionally since patient is been seizure-free for 2 years we will recommend a year follow-up.    Diagnoses and all orders for this visit:    1. Nonintractable epilepsy without status epilepticus, unspecified epilepsy type (Primary)        Plan:  Continue Keppra 1000 mg twice daily  Continue Zonegran 100 mg capsules, take 3 capsules nightly  Recommend seizure precautions  Patient to contact her office in the event occurs.  Recommend evaluation emergency room for any seizure lasting longer than 5 to 7 minutes or prolonged postictal time greater than 30 minutes or if new seizure features develop.  Follow-up with PCP as scheduled  Follow-up in our clinic in 1 year or sooner if  needed.    The patient and I have discussed the plan of care and he is in full agreement at this time.     Follow Up   Return in about 1 year (around 3/19/2025) for Seizure.            Kaycee Bruce, NITHIN  03/19/24  14:53 CDT

## 2024-04-11 ENCOUNTER — LAB (OUTPATIENT)
Dept: LAB | Facility: HOSPITAL | Age: 54
End: 2024-04-11
Payer: COMMERCIAL

## 2024-04-11 ENCOUNTER — OFFICE VISIT (OUTPATIENT)
Dept: CARDIOLOGY | Facility: CLINIC | Age: 54
End: 2024-04-11
Payer: COMMERCIAL

## 2024-04-11 VITALS
BODY MASS INDEX: 35.79 KG/M2 | SYSTOLIC BLOOD PRESSURE: 118 MMHG | OXYGEN SATURATION: 97 % | HEIGHT: 67 IN | RESPIRATION RATE: 18 BRPM | WEIGHT: 228 LBS | DIASTOLIC BLOOD PRESSURE: 70 MMHG

## 2024-04-11 DIAGNOSIS — I10 PRIMARY HYPERTENSION: ICD-10-CM

## 2024-04-11 DIAGNOSIS — E78.2 MIXED HYPERLIPIDEMIA: ICD-10-CM

## 2024-04-11 DIAGNOSIS — R00.1 BRADYCARDIA, SINUS: ICD-10-CM

## 2024-04-11 DIAGNOSIS — Z72.0 TOBACCO ABUSE: ICD-10-CM

## 2024-04-11 DIAGNOSIS — I50.22 CHRONIC SYSTOLIC (CONGESTIVE) HEART FAILURE: Primary | ICD-10-CM

## 2024-04-11 DIAGNOSIS — I50.22 CHRONIC SYSTOLIC (CONGESTIVE) HEART FAILURE: ICD-10-CM

## 2024-04-11 PROBLEM — R94.31 EKG ABNORMALITIES: Status: RESOLVED | Noted: 2022-07-27 | Resolved: 2024-04-11

## 2024-04-11 PROBLEM — R07.9 CHEST PAIN: Status: RESOLVED | Noted: 2023-12-04 | Resolved: 2024-04-11

## 2024-04-11 LAB
ANION GAP SERPL CALCULATED.3IONS-SCNC: 9 MMOL/L (ref 5–15)
BUN SERPL-MCNC: 19 MG/DL (ref 6–20)
BUN/CREAT SERPL: 21.6 (ref 7–25)
CALCIUM SPEC-SCNC: 9.4 MG/DL (ref 8.6–10.5)
CHLORIDE SERPL-SCNC: 106 MMOL/L (ref 98–107)
CO2 SERPL-SCNC: 26 MMOL/L (ref 22–29)
CREAT SERPL-MCNC: 0.88 MG/DL (ref 0.76–1.27)
EGFRCR SERPLBLD CKD-EPI 2021: 102.2 ML/MIN/1.73
GLUCOSE SERPL-MCNC: 154 MG/DL (ref 65–99)
POTASSIUM SERPL-SCNC: 3.7 MMOL/L (ref 3.5–5.2)
SODIUM SERPL-SCNC: 141 MMOL/L (ref 136–145)

## 2024-04-11 PROCEDURE — 36415 COLL VENOUS BLD VENIPUNCTURE: CPT

## 2024-04-11 PROCEDURE — 80048 BASIC METABOLIC PNL TOTAL CA: CPT

## 2024-04-11 NOTE — PROGRESS NOTES
Subjective:     Encounter Date:04/11/2024      Patient ID: Mayito Bassett is a 53 y.o. male     Chief Complaint:  Congestive Heart Failure  Presents for follow-up visit. Associated symptoms include fatigue and shortness of breath. Pertinent negatives include no abdominal pain, chest pain, near-syncope or palpitations. Compliance with diet is %. Compliance with exercise is %. Compliance with medications is %.     Patient presents today for routine cardiology follow up. At last OV he had his Entresto increased. He notes he has tolerated this well. He had labs this morning which were stable. Blood Glucose eleavted but he was not fasting. Patient is followed for congestive heart failure, sinus bradycardia, nonobstructive coronary artery disease. He previously followed with [facility] team. Of note he came to see Dr. Allred in July of 2022 for bradycardia and abnormal EKG. He had a holter monitor which showed average heart rate of 57 and brief runs of PAT. He had a stress echo which was low risk but LVEF was estimated to be 41-45%. He had a heart cath in Decmber 2022 with [facility] with showed mild nonobstructive disease. He follows with Dr. Ospina as his PCP. Patient has seizures and previously followed with neurology in [place]. He has GERD, hyperlipidemia, hypertension, obesity and sinus bradycardia. Patient's last echo was in February which showed LVEF 41-45%, trace MR. This is stable with no acute changes. He attempts a low salt diet. He reports some chronic shortness of breath and fatigue. Denies volume overload. He has significantly reduced the amount he smokes.     The following portions of the patient's history were reviewed and updated as appropriate: allergies, current medications, past medical history, past social history, past and problem list.    No Known Allergies    Current Outpatient Medications:     aspirin 81 MG EC tablet, Take 1 tablet by  mouth Daily., Disp: 90 tablet, Rfl: 3    cyclobenzaprine (FLEXERIL) 10 MG tablet, Take 1 tablet by mouth At Night As Needed for Muscle Spasms., Disp: 30 tablet, Rfl: 5    dapagliflozin Propanediol 10 MG tablet, Take 10 mg by mouth Daily., Disp: 90 tablet, Rfl: 3    famotidine (PEPCID) 20 MG tablet, Take 1 tablet by mouth 2 (Two) Times a Day., Disp: 180 tablet, Rfl: 1    levETIRAcetam (KEPPRA) 1000 MG tablet, Take 1 tablet by mouth 2 (Two) Times a Day., Disp: 60 tablet, Rfl: 5    Multiple Vitamin (MULTI VITAMIN MENS PO), Take  by mouth., Disp: , Rfl:     polyethylene glycol (MIRALAX) 17 GM/SCOOP powder, Take 17 g by mouth Daily., Disp: 510 g, Rfl: 0    sacubitril-valsartan (ENTRESTO) 49-51 MG tablet, Take 1 tablet by mouth 2 (Two) Times a Day., Disp: 60 tablet, Rfl: 11    sennosides-docusate (senna-docusate sodium) 8.6-50 MG per tablet, Take 1 tablet by mouth 2 (Two) Times a Day As Needed for Constipation., Disp: 60 tablet, Rfl: 0    spironolactone (ALDACTONE) 25 MG tablet, Take 1 tablet by mouth Daily., Disp: 30 tablet, Rfl: 11    traZODone (DESYREL) 100 MG tablet, Take one tablet at bedtime, Disp: 90 tablet, Rfl: 1    zonisamide (ZONEGRAN) 100 MG capsule, Take 3 capsules by mouth Every Night., Disp: 90 capsule, Rfl: 5    Social History     Socioeconomic History    Marital status: Single   Tobacco Use    Smoking status: Every Day     Current packs/day: 1.00     Average packs/day: 1 pack/day for 37.0 years (37.0 ttl pk-yrs)     Types: Cigarettes    Smokeless tobacco: Never   Vaping Use    Vaping status: Never Used   Substance and Sexual Activity    Alcohol use: Yes     Comment: 1 A MONTH    Drug use: Never     Types: Marijuana     Comment: YEARS AGO    Sexual activity: Not Currently     Partners: Female     Birth control/protection: None       Review of Systems   Constitutional: Positive for fatigue and malaise/fatigue. Negative for chills, decreased appetite, fever, weight gain and weight loss.   HENT:  Negative for  nosebleeds.    Eyes:  Negative for visual disturbance.   Cardiovascular:  Positive for dyspnea on exertion. Negative for chest pain, leg swelling, near-syncope, orthopnea, palpitations, paroxysmal nocturnal dyspnea and syncope.   Respiratory:  Positive for shortness of breath. Negative for cough, hemoptysis and snoring.    Endocrine: Negative for cold intolerance and heat intolerance.   Hematologic/Lymphatic: Negative for bleeding problem. Does not bruise/bleed easily.   Skin:  Negative for rash.   Musculoskeletal:  Negative for back pain and falls.   Gastrointestinal:  Negative for abdominal pain, constipation, diarrhea, heartburn, melena, nausea and vomiting.   Genitourinary:  Negative for hematuria.   Neurological:  Negative for dizziness, headaches and light-headedness.   Psychiatric/Behavioral:  Negative for altered mental status.    Allergic/Immunologic: Negative for persistent infections.              Objective:     Constitutional:       Appearance: Healthy appearance. Well-developed and not in distress.   Eyes:      Pupils: Pupils are equal, round, and reactive to light.   HENT:      Head: Normocephalic and atraumatic.   Neck:      Vascular: No carotid bruit or JVD.   Pulmonary:      Effort: Pulmonary effort is normal.      Breath sounds: Normal breath sounds.   Cardiovascular:      Bradycardia present. Regular rhythm.      Murmurs: There is no murmur.   Pulses:     Intact distal pulses.   Edema:     Peripheral edema absent.   Abdominal:      General: Bowel sounds are normal.      Palpations: Abdomen is soft.   Musculoskeletal: Normal range of motion.      Cervical back: Normal range of motion and neck supple. Skin:     General: Skin is warm and dry.   Neurological:      Mental Status: Alert and oriented to person, place, and time.      Deep Tendon Reflexes: Reflexes are normal and symmetric.   Psychiatric:         Behavior: Behavior normal.         Thought Content: Thought content normal.         Judgment:  "Judgment normal.           Procedures  /70 (BP Location: Right arm, Patient Position: Sitting, Cuff Size: Adult)   Resp 18   Ht 170.2 cm (67\")   Wt 103 kg (228 lb)   SpO2 97%   BMI 35.71 kg/m²   Lab Review:   I have reviewed   Lab Results   Component Value Date    GLUCOSE 154 (H) 04/11/2024    CALCIUM 9.4 04/11/2024     04/11/2024    K 3.7 04/11/2024    CO2 26.0 04/11/2024     04/11/2024    BUN 19 04/11/2024    CREATININE 0.88 04/11/2024    EGFR 102.2 04/11/2024    BCR 21.6 04/11/2024    ANIONGAP 9.0 04/11/2024          Lab Results   Component Value Date    CHOL 134 02/07/2024    TRIG 136 02/07/2024    HDL 40 02/07/2024    LDL 70 02/07/2024      Results for orders placed during the hospital encounter of 02/27/24    Adult Transthoracic Echo Complete W/ Cont if Necessary Per Protocol    Interpretation Summary    Left ventricular systolic function is mildly decreased. Left ventricular ejection fraction appears to be 41 - 45%.    The right ventricular cavity is moderately dilated. Normal right ventricular systolic function noted.    The left atrial cavity is mildly dilated.    Trace mitral valve regurgitation is present.    The following segments are hypokinetic: apical anterior, apical septal, apical inferior, apical lateral and apex.     Assessment:          Diagnosis Plan   1. Chronic systolic (congestive) heart failure        2. Bradycardia, sinus        3. Mixed hyperlipidemia        4. Primary hypertension        5. Tobacco abuse                   Plan:       Chronic Systolic congestive Heart Failure - On Farxiga, Aldactone, Entresto. Labs stable. Tolerated increase in Entresto. BP soft. Not on beta blocker due to bradycardia. Low Salt diet. Denies volume overload.   Sinus Bradycardia- asymptomatic. Stable.   Hyperlipidemia- on statin. Managed by PCP. Good control on recent labs.   Hypertension- blood pressure stable on current regimen    Mayito Bassett  reports that he has been " smoking cigarettes. He has a 37 pack-year smoking history. He has never used smokeless tobacco. I have educated him on the risk of diseases from using tobacco products such as cancer, COPD, and heart disease.     I advised him to quit and he is willing to quit. We have discussed the following method/s for tobacco cessation:  Education Material.  Together we have set a quit date for 1 month from today.  He will follow up with me in 3 months or sooner to check on his progress.    I spent 3  minutes counseling the patient.          Will follow with Dr. Luong and myself. Follow up in 3-4 months.

## 2024-05-17 ENCOUNTER — OFFICE VISIT (OUTPATIENT)
Dept: FAMILY MEDICINE CLINIC | Facility: CLINIC | Age: 54
End: 2024-05-17
Payer: COMMERCIAL

## 2024-05-17 VITALS
WEIGHT: 219 LBS | BODY MASS INDEX: 34.37 KG/M2 | SYSTOLIC BLOOD PRESSURE: 100 MMHG | TEMPERATURE: 97.9 F | HEART RATE: 65 BPM | OXYGEN SATURATION: 99 % | DIASTOLIC BLOOD PRESSURE: 70 MMHG | RESPIRATION RATE: 19 BRPM | HEIGHT: 67 IN

## 2024-05-17 DIAGNOSIS — M25.561 PAIN IN BOTH KNEES, UNSPECIFIED CHRONICITY: Primary | ICD-10-CM

## 2024-05-17 DIAGNOSIS — M25.562 PAIN IN BOTH KNEES, UNSPECIFIED CHRONICITY: Primary | ICD-10-CM

## 2024-05-17 RX ORDER — METHYLPREDNISOLONE 4 MG/1
TABLET ORAL
Qty: 21 TABLET | Refills: 0 | Status: SHIPPED | OUTPATIENT
Start: 2024-05-17

## 2024-05-19 NOTE — PROGRESS NOTES
"Chief Complaint  Knee Pain (Left Knee)    Subjective        Mayito Bassett presents to [facility]  Knee Pain       L > R moderate aching knee pain worse over the past 2 days with weather changes without injury.     Objective   Vital Signs:  /70 (BP Location: Left arm, Patient Position: Sitting, Cuff Size: Adult)   Pulse 65   Temp 97.9 °F (36.6 °C) (Infrared)   Resp 19   Ht 170.2 cm (67.01\")   Wt 99.3 kg (219 lb)   SpO2 99%   BMI 34.29 kg/m²   Estimated body mass index is 34.29 kg/m² as calculated from the following:    Height as of this encounter: 170.2 cm (67.01\").    Weight as of this encounter: 99.3 kg (219 lb).             Physical Exam  Vitals and nursing note reviewed.   Constitutional:       General: He is not in acute distress.     Appearance: He is not diaphoretic.   HENT:      Head: Normocephalic and atraumatic.      Nose: Nose normal.   Eyes:      General: No scleral icterus.        Right eye: No discharge.         Left eye: No discharge.      Conjunctiva/sclera: Conjunctivae normal.   Neck:      Trachea: No tracheal deviation.   Pulmonary:      Effort: Pulmonary effort is normal.   Musculoskeletal:      Comments: Crepitus b/l   Skin:     General: Skin is warm and dry.      Coloration: Skin is not pale.   Neurological:      Mental Status: He is alert and oriented to person, place, and time.   Psychiatric:         Behavior: Behavior normal.         Thought Content: Thought content normal.         Judgment: Judgment normal.        Result Review :                     Assessment and Plan     Diagnoses and all orders for this visit:    1. Pain in both knees, unspecified chronicity (Primary)  -     methylPREDNISolone (MEDROL) 4 MG dose pack; Take as directed on package instructions.  Dispense: 21 tablet; Refill: 0             Follow Up     No follow-ups on file.  Patient was given instructions and counseling regarding his condition or for health maintenance " advice. Please see specific information pulled into the AVS if appropriate.

## 2024-06-18 ENCOUNTER — APPOINTMENT (OUTPATIENT)
Dept: CT IMAGING | Facility: HOSPITAL | Age: 54
End: 2024-06-18
Payer: COMMERCIAL

## 2024-06-18 ENCOUNTER — HOSPITAL ENCOUNTER (EMERGENCY)
Facility: HOSPITAL | Age: 54
Discharge: HOME | End: 2024-06-19
Payer: COMMERCIAL

## 2024-06-18 DIAGNOSIS — M25.561 PAIN IN BOTH KNEES, UNSPECIFIED CHRONICITY: ICD-10-CM

## 2024-06-18 DIAGNOSIS — M48.061 LUMBAR FORAMINAL STENOSIS: ICD-10-CM

## 2024-06-18 DIAGNOSIS — M25.562 PAIN IN BOTH KNEES, UNSPECIFIED CHRONICITY: ICD-10-CM

## 2024-06-18 DIAGNOSIS — K59.00 CONSTIPATION, UNSPECIFIED CONSTIPATION TYPE: ICD-10-CM

## 2024-06-18 DIAGNOSIS — M54.41 ACUTE RIGHT-SIDED LOW BACK PAIN WITH RIGHT-SIDED SCIATICA: Primary | ICD-10-CM

## 2024-06-18 PROCEDURE — 72131 CT LUMBAR SPINE W/O DYE: CPT

## 2024-06-18 PROCEDURE — 25010000002 ORPHENADRINE CITRATE PER 60 MG: Performed by: PHYSICIAN ASSISTANT

## 2024-06-18 PROCEDURE — 25010000002 DEXAMETHASONE PER 1 MG: Performed by: EMERGENCY MEDICINE

## 2024-06-18 PROCEDURE — 96372 THER/PROPH/DIAG INJ SC/IM: CPT

## 2024-06-18 PROCEDURE — 99284 EMERGENCY DEPT VISIT MOD MDM: CPT

## 2024-06-18 PROCEDURE — 72128 CT CHEST SPINE W/O DYE: CPT

## 2024-06-18 RX ORDER — LIDOCAINE 4 G/G
1 PATCH TOPICAL ONCE
Status: DISCONTINUED | OUTPATIENT
Start: 2024-06-18 | End: 2024-06-19 | Stop reason: HOSPADM

## 2024-06-18 RX ORDER — HYDROCODONE BITARTRATE AND ACETAMINOPHEN 10; 325 MG/1; MG/1
1 TABLET ORAL ONCE
Status: COMPLETED | OUTPATIENT
Start: 2024-06-18 | End: 2024-06-18

## 2024-06-18 RX ORDER — ORPHENADRINE CITRATE 30 MG/ML
60 INJECTION INTRAMUSCULAR; INTRAVENOUS ONCE
Status: COMPLETED | OUTPATIENT
Start: 2024-06-18 | End: 2024-06-18

## 2024-06-18 RX ORDER — DEXAMETHASONE SODIUM PHOSPHATE 10 MG/ML
10 INJECTION INTRAMUSCULAR; INTRAVENOUS ONCE
Status: COMPLETED | OUTPATIENT
Start: 2024-06-18 | End: 2024-06-18

## 2024-06-18 RX ADMIN — LIDOCAINE 1 PATCH: 4 PATCH TOPICAL at 22:31

## 2024-06-18 RX ADMIN — ORPHENADRINE CITRATE 60 MG: 60 INJECTION INTRAMUSCULAR; INTRAVENOUS at 22:32

## 2024-06-18 RX ADMIN — HYDROCODONE BITARTRATE AND ACETAMINOPHEN 1 TABLET: 10; 325 TABLET ORAL at 22:32

## 2024-06-18 RX ADMIN — DEXAMETHASONE SODIUM PHOSPHATE 10 MG: 10 INJECTION INTRAMUSCULAR; INTRAVENOUS at 22:32

## 2024-06-18 NOTE — Clinical Note
[facility] EMERGENCY DEPARTMENT  [address]  [facility] [address]  Phone: [phone]    Mayito Bassett was seen and treated in our emergency department on 6/18/2024.  He may return to work on 06/21/2024.         Thank you for choosing [facility].    Aden Ashton PA-C

## 2024-06-19 VITALS
BODY MASS INDEX: 34.69 KG/M2 | TEMPERATURE: 98.2 F | SYSTOLIC BLOOD PRESSURE: 121 MMHG | RESPIRATION RATE: 18 BRPM | HEART RATE: 59 BPM | WEIGHT: 221 LBS | HEIGHT: 67 IN | OXYGEN SATURATION: 100 % | DIASTOLIC BLOOD PRESSURE: 76 MMHG

## 2024-06-19 RX ORDER — LIDOCAINE 50 MG/G
1 PATCH TOPICAL EVERY 24 HOURS
Qty: 30 EACH | Refills: 0 | Status: SHIPPED | OUTPATIENT
Start: 2024-06-19

## 2024-06-19 RX ORDER — METHYLPREDNISOLONE 4 MG/1
TABLET ORAL
Qty: 21 TABLET | Refills: 0 | Status: SHIPPED | OUTPATIENT
Start: 2024-06-19 | End: 2024-06-19

## 2024-06-19 RX ORDER — TIZANIDINE 4 MG/1
4 TABLET ORAL EVERY 8 HOURS PRN
Qty: 12 TABLET | Refills: 0 | Status: SHIPPED | OUTPATIENT
Start: 2024-06-19

## 2024-06-19 RX ORDER — POLYETHYLENE GLYCOL 3350 17 G/17G
17 POWDER, FOR SOLUTION ORAL DAILY
Qty: 510 G | Refills: 0 | Status: SHIPPED | OUTPATIENT
Start: 2024-06-19 | End: 2024-06-19

## 2024-06-19 RX ORDER — AMOXICILLIN 250 MG
1 CAPSULE ORAL 2 TIMES DAILY PRN
Qty: 60 TABLET | Refills: 0 | Status: SHIPPED | OUTPATIENT
Start: 2024-06-19 | End: 2024-06-19

## 2024-06-19 RX ORDER — TIZANIDINE 4 MG/1
4 TABLET ORAL EVERY 8 HOURS PRN
Qty: 12 TABLET | Refills: 0 | Status: SHIPPED | OUTPATIENT
Start: 2024-06-19 | End: 2024-06-19

## 2024-06-19 RX ORDER — LIDOCAINE 50 MG/G
1 PATCH TOPICAL EVERY 24 HOURS
Qty: 30 EACH | Refills: 0 | Status: SHIPPED | OUTPATIENT
Start: 2024-06-19 | End: 2024-06-19

## 2024-06-19 NOTE — ED PROVIDER NOTES
Subjective   History of Present Illness    Patient is a 53-year-old male presenting to ED with back pain.  PMH significant for chronic back pain, CHF, depression, GERD, obesity, seizure disorder, asthma, arthritis, anxiety.  Patient states he has a longstanding history of intermittent pulled muscles and chronic back pain.  Patient states that he recently moved for which they have had to unpack for a prolonged period of time and he also recently started a new job at [employer].  Patient states 2 to 3 days ago he developed a worsening of his chronic pain in his right lower lateral back which is worse with any movement including twisting.  Patient states that there is no radiation down his legs including no numbness or weakness.  Patient denies saddle anesthesia, incontinence of bowel or bladder, or fevers.  Patient states that he has tried taking over-the-counter medications such as ibuprofen but has not had any today with no relief of his symptoms.  Patient did state that he takes a muscle relaxer Flexeril at nighttime for which the last 2 nights he has had no relief and has not yet had this evening.  Patient denies any other known falls, blunt trauma.  Patient states that he has never received any intraspinal injections, has no previous surgical spinal history, and presents at this time for further evaluation.    Records reviewed show patient was last in the outpatient send at the PCP office on 5/17/2024 for pain in both knees chronically.    Patient last seen in the ED on 5/7/2018 for lumbar strain.    Patient's last spinal imaging was a lumbar spine x-ray on 12/5/2022 which showed: No acute osseous abnormalities, mild degenerative changes, slightly progressed from previous imaging.    Review of Systems   Constitutional: Negative.  Negative for fever.   HENT: Negative.     Eyes: Negative.    Respiratory: Negative.     Cardiovascular: Negative.    Gastrointestinal: Negative.  Negative for abdominal pain,  nausea and vomiting.   Genitourinary:  Negative for difficulty urinating and flank pain.   Musculoskeletal:  Positive for back pain (right lumbar). Negative for arthralgias, gait problem (increased pain with ambulation but no diff with gait), joint swelling and neck pain.   Skin: Negative.  Negative for rash and wound.   Neurological: Negative.  Negative for weakness and numbness.        Denies saddle anesthesia  Denies incontinence of bowel or bladder   Psychiatric/Behavioral: Negative.     All other systems reviewed and are negative.      Past Medical History:   Diagnosis Date    Anxiety     Arthritis     Asthma     Cancer     Chronic back pain     Congestive heart failure (CHF)     Depression     EKG abnormalities 07/27/2022    GERD (gastroesophageal reflux disease)     Obesity (BMI 30-39.9)     Seizures     Tendonitis 2023    right wrist and hand, palms and fingers       No Known Allergies    Past Surgical History:   Procedure Laterality Date    CARDIAC CATHETERIZATION Left 12/06/2022    Procedure: Left Heart Cath;  Surgeon: Wil Bustos MD;  Location: [facility] CATH INVASIVE LOCATION;  Service: Cardiology;  Laterality: Left;    CARDIAC CATHETERIZATION  12/6/2022    WRIST GANGLION EXCISION Left        Family History   Problem Relation Age of Onset    Heart disease Mother     Kidney disease Mother     Diabetes Mother     No Known Problems Sister     No Known Problems Brother     No Known Problems Maternal Grandmother     No Known Problems Maternal Grandfather     No Known Problems Paternal Grandmother     No Known Problems Paternal Grandfather     No Known Problems Daughter     No Known Problems Son     No Known Problems Cousin     Rheum arthritis Neg Hx     Osteoarthritis Neg Hx     Asthma Neg Hx     Heart failure Neg Hx     Hyperlipidemia Neg Hx     Hypertension Neg Hx     Migraines Neg Hx     Rashes / Skin problems Neg Hx     Seizures Neg Hx     Stroke Neg Hx     Thyroid disease Neg Hx        Social History      Socioeconomic History    Marital status: Single   Tobacco Use    Smoking status: Every Day     Current packs/day: 1.00     Average packs/day: 1 pack/day for 37.0 years (37.0 ttl pk-yrs)     Types: Cigarettes    Smokeless tobacco: Never   Vaping Use    Vaping status: Never Used   Substance and Sexual Activity    Alcohol use: Yes     Comment: 1 A MONTH    Drug use: Never     Types: Marijuana     Comment: YEARS AGO    Sexual activity: Not Currently     Partners: Female     Birth control/protection: None           Objective   Physical Exam  Vitals and nursing note reviewed.   Constitutional:       General: He is not in acute distress.     Appearance: Normal appearance. He is well-developed and well-groomed. He is obese. He is not ill-appearing, toxic-appearing or diaphoretic.   HENT:      Head: Normocephalic.      Mouth/Throat:      Mouth: Mucous membranes are moist.      Pharynx: Oropharynx is clear.   Eyes:      Pupils: Pupils are equal, round, and reactive to light.   Cardiovascular:      Rate and Rhythm: Normal rate.      Pulses: Normal pulses.   Pulmonary:      Effort: Pulmonary effort is normal.      Breath sounds: Normal breath sounds.   Abdominal:      Palpations: Abdomen is soft.      Tenderness: There is no abdominal tenderness. There is no right CVA tenderness or left CVA tenderness.   Musculoskeletal:         General: Tenderness present. No swelling or signs of injury. Normal range of motion.      Right lower leg: No edema.      Left lower leg: No edema.      Comments: Right-sided lumbar paraspinal muscular spasms with no left-sided paraspinal muscular abnormalities and no midline tenderness.  Normal inspection of thoracic and cervical region.  All 4 extremities are neurovascular intact distally with no bony tenderness, no joint swelling.  Right leg positive Lasegue's sign.  Left leg negative Lasegue's sign.   Skin:     General: Skin is warm.      Findings: No signs of injury or rash.   Neurological:       Mental Status: He is alert and oriented to person, place, and time.      Sensory: No sensory deficit.      Motor: No weakness (5/5 symmetric strength of bilateral lower extremities).      Gait: Gait normal.   Psychiatric:         Mood and Affect: Mood normal.         Behavior: Behavior normal. Behavior is cooperative.         Procedures           ED Course                                             Medical Decision Making  Amount and/or Complexity of Data Reviewed  External Data Reviewed: labs, radiology and notes.  Labs:  Decision-making details documented in ED Course.  Radiology: ordered. Decision-making details documented in ED Course.  ECG/medicine tests: ordered. Decision-making details documented in ED Course.    Risk  OTC drugs.  Prescription drug management.      Patient is a 53-year-old male presenting to ED with back pain.  PMH significant for chronic back pain, CHF, depression, GERD, obesity, seizure disorder, asthma, arthritis, anxiety.  Upon initial evaluation patient is resting comfortably in bed in no acute distress, nontoxic-appearing, not ill-appearing, nondiaphoretic.  Examination finds right-sided lumbar paraspinal muscular spasms with no left-sided paraspinal muscular abnormalities and no midline tenderness.  Normal inspection of thoracic and cervical region.  All 4 extremities are neurovascular intact distally with no bony tenderness, no joint swelling.  Right leg positive Lasegue's sign.  Left leg negative Lasegue's sign.  No reproducible anterior abdominal tenderness.  Abdomen is otherwise soft.  Examination is otherwise unremarkable including no dermatological abnormalities such as trauma, rashes, or injuries.  Discussed with patient need for workup to include CT imaging and ability to control his pain for which he is amenable with no further questions, concerns, or needs at this time.    Lumbar strain, herniated/bulging disc, lumbar radiculopathy, compression fracture, cauda equina,  referred pain, other    Thoracic spine CT showed: No acute fracture or subluxation, no hematoma.  Lumbar spine CT showed: No acute findings, no significant central canal stenosis, mild to moderate bilateral foraminal stenosis L3-4, L4-5, moderate to severe left and mild right L5-S1 foraminal stenosis. Patient was given a lidocaine patch, Norflex, Norco, and a dose of Decadron for treatment of sciatica.  Patient had improvement in his pain and discomfort and throughout evaluation had no focal neurological deficits including able to ambulate without difficulty.  Discussed with patient need for continued symptomatic treatment of low back strain and sciatica with heat followed by gentle range of motion stretching, anti-inflammatories, topical lidocaine or Biofreeze, appropriate use of his home muscle relaxers, and need for PCP follow-up within the next 48 hours for close reevaluation.  Discussed need to speak with primary care provider to initiate physical therapy treatment.  Advised strict return precautions and need for immediate return to ED should he develop any new or worsening symptoms.  Patient continues to have no focal neurological deficits, ambulates without difficulty, and is able to urinate without difficulty.  Patient is appreciative with no further questions, concerns, or needs at this time and is stable for discharge.    Final diagnoses:   Acute right-sided low back pain with right-sided sciatica   Lumbar foraminal stenosis       ED Disposition  ED Disposition       ED Disposition   Discharge    Condition   Stable    Comment   --               Donald Ospina,   [address] [facility] [facility] [address]  [address]  formerly [facility] [address]  [phone]    Schedule an appointment as soon as possible for a visit in 2 days      [facility] EMERGENCY DEPARTMENT  [address] [place] [id number] [phone]    As needed         Medication List        New Prescriptions      diclofenac 50 MG EC  tablet  Commonly known as: VOLTAREN  Take 1 tablet by mouth 2 (Two) Times a Day As Needed (pain).     lidocaine 5 %  Commonly known as: LIDODERM  Place 1 patch on the skin as directed by provider Daily. Remove & Discard patch within 12 hours or as directed by MD     tiZANidine 4 MG tablet  Commonly known as: Zanaflex  Take 1 tablet by mouth Every 8 (Eight) Hours As Needed for Muscle Spasms.            Stop      cyclobenzaprine 10 MG tablet  Commonly known as: FLEXERIL               Where to Get Your Medications        These medications were sent to [facility] [address] [place] [address] [address] [facility] [phone] [facility] [phone]   [address] [place] [address]      Phone: [phone]   diclofenac 50 MG EC tablet  lidocaine 5 %  tiZANidine 4 MG tablet            Aden Ashton PA-C  06/19/24 0103

## 2024-06-19 NOTE — DISCHARGE INSTRUCTIONS
As we discussed you likely injured your back with the new heavy lifting and movements at your job.  Please make sure that you are using proper body mechanics, applying heat to your back followed by gentle range of motion stretching.  Topically you may use lidocaine patches or Biofreeze.  Please begin using the Zanaflex muscle relaxer as needed at nighttime, continue using the oral arthritis medicine diclofenac, and follow-up with your primary care provider within the next 48 hours for close reevaluation, referral to physical therapy.  Should you develop any new or worsening symptoms please return to the ER for further evaluation

## 2024-07-01 ENCOUNTER — OFFICE VISIT (OUTPATIENT)
Dept: FAMILY MEDICINE CLINIC | Facility: CLINIC | Age: 54
End: 2024-07-01
Payer: COMMERCIAL

## 2024-07-01 VITALS
HEART RATE: 65 BPM | DIASTOLIC BLOOD PRESSURE: 78 MMHG | TEMPERATURE: 97.5 F | RESPIRATION RATE: 18 BRPM | SYSTOLIC BLOOD PRESSURE: 112 MMHG | OXYGEN SATURATION: 99 % | HEIGHT: 67 IN | BODY MASS INDEX: 35.28 KG/M2 | WEIGHT: 224.8 LBS

## 2024-07-01 DIAGNOSIS — M99.08 SEGMENTAL AND SOMATIC DYSFUNCTION OF RIB CAGE: ICD-10-CM

## 2024-07-01 DIAGNOSIS — M99.02 SOMATIC DYSFUNCTION OF SPINE, THORACIC: ICD-10-CM

## 2024-07-01 DIAGNOSIS — M54.50 ACUTE RIGHT-SIDED LOW BACK PAIN WITHOUT SCIATICA: Primary | ICD-10-CM

## 2024-07-01 DIAGNOSIS — M99.03 SOMATIC DYSFUNCTION OF SPINE, LUMBAR: ICD-10-CM

## 2024-07-01 PROCEDURE — 1160F RVW MEDS BY RX/DR IN RCRD: CPT | Performed by: FAMILY MEDICINE

## 2024-07-01 PROCEDURE — 1159F MED LIST DOCD IN RCRD: CPT | Performed by: FAMILY MEDICINE

## 2024-07-01 PROCEDURE — 1126F AMNT PAIN NOTED NONE PRSNT: CPT | Performed by: FAMILY MEDICINE

## 2024-07-01 PROCEDURE — 98926 OSTEOPATH MANJ 3-4 REGIONS: CPT | Performed by: FAMILY MEDICINE

## 2024-07-01 PROCEDURE — 99213 OFFICE O/P EST LOW 20 MIN: CPT | Performed by: FAMILY MEDICINE

## 2024-07-01 RX ORDER — CYCLOBENZAPRINE HCL 10 MG
10 TABLET ORAL NIGHTLY PRN
COMMUNITY
Start: 2024-06-22

## 2024-07-01 NOTE — PATIENT INSTRUCTIONS
What is Osteopathic Medicine  Osteopathic medicine provides all of the benefits of modern medicine including prescription drugs, surgery, and the use of technology to diagnose disease and evaluate injury. It also offers the added benefit of hands-on diagnosis and treatment through a system of therapy known as osteopathic manipulative medicine. Osteopathic medicine emphasizes helping each person achieve a high level of wellness by focusing on health promotion and disease prevention. ([email or web address])     What is a DO  Doctor's of Osteopathy (DO) are fully trained physicians, capable of practicing the entire scope of medicine. In addition to conventional medical practice, DO’s are trained to use their hands to palpate (feel) tissue function and dysfunction. Gentle manipulative techniques are applied, restoring optimal function (motion).     4 Principles define Osteopathic Medicine  The body is a fully integrated being of body, mind and spirit  The body is capable of self-regulation, self-healing, and health maintenance  Structure and function are interrelated  Rational treatment is based upon an understanding of the basic principles of body unity, self-regulation, and the relationship of structure and function     Osteopathic Manipulative Medicine (OMM)   OMM encompasses a wide range of techniques addressing problems in joints, ligaments, muscles, tendons, and fascia (tissue surrounding muscles and other organs) that may cause pain or interfere with the body’s function. When there are restrictions within the structure of the body it does not function properly, often times causing pain. Using different techniques (listed below) the restrictions in the body are relieved so the body can function at optimal health. Patients often experience a sense of deep relaxation, tingling, fluid flows and relief of pain. These changes may be experienced immediately as they occur or later after the treatment. OMM may be referred to as  Osteopathic Manipulative Treatment (OMT).     Common Treatment Modalities  Osteopathy in the Cranial Field- a system of treatment that utilizes the intrinsic motion of the cranial and neurological system to treat the whole body     Myofascial Release - used to treat restrictions of muscle and fascia     Counterstrain - focused on specific tender points on the body that are held in a position of comfort for 90 seconds, after which the tenderness is relieved     Muscle Energy - uses the relaxation after a muscle is contracted to stretch muscles and increase range of motion     Balanced Ligamentous Tension - ligaments or joints are placed into a state of balanced until the tension is relieved     Facilitated Positional Release - patient’s spine is placed at neutral position while the isolated segment for treatment is placed at ease. Compression or traction is then added to release the tension in the muscle, fascia, and/or joints     High Velocity Low Amplitude (HVLA)- use of fast, short thrusts through restricted joints; a technique with which most people are familiar (also known as the “cracking” or “popping” technique)     Who would benefit  Osteopathy treats the patient, not the disease. Our intention is to find and restore health as well as structural integrity and fluid continuity.      Some of the problems that typically respond to osteopathic treatment:  SOMATIC PAIN  Back, neck, and joint pain  Sciatica  Headaches/Migraines  Temporal Mandibular Joint Dysfunction (TMJ)     TRAUMATIC INJURIES  Head trauma  Post Concussion Syndrome  Overuse Syndromes  Whiplash Syndromes     CHRONIC CONDITIONS UNRESPONSIVE TO CONVENTIAL TREATMENT  Neurologic disorders  Gastrointestinal disorders  Genitourinary disorders  Respiratory Disorders     WOMEN DURING PREGNANCY can be made more comfortable, their labor and delivery eased considerably by providing freedom to the ligamentous support of the uterus and pelvis.     ADDITIONAL  RESOURCES  www.osteopathic.org  www.osteodoc.com  http://www.do-sf.com/aboutosteopathy/research/ (Research articles)  Book: Dr. Mendoza’s Touch of Life by Wil Mendoza DO     Information gathered from osteodoc.com,  aacom.org, A Brief Guide to Osteopathic Medicine.

## 2024-07-01 NOTE — PROGRESS NOTES
"Chief Complaint  Hospital Follow Up Visit (06/18/2024 R sided low back pain)    Subjective        Mayito Bassett presents to [facility]  History of Present Illness  Seen in the hospital on 6/18/2024 for acute right-sided low back pain with right-sided sciatica at that time, spondylosis was noted along with degenerative change on CT imaging, sciatica has since resolved but he still has cramping pain along the right flank worse with moving and lifting, symptoms have been getting progressively worse, keeping him unable to work.    Objective   Vital Signs:  /78   Pulse 65   Temp 97.5 °F (36.4 °C)   Resp 18   Ht 170.2 cm (67.01\")   Wt 102 kg (224 lb 12.8 oz)   SpO2 99%   BMI 35.20 kg/m²   Estimated body mass index is 35.2 kg/m² as calculated from the following:    Height as of this encounter: 170.2 cm (67.01\").    Weight as of this encounter: 102 kg (224 lb 12.8 oz).               Physical Exam  Vitals and nursing note reviewed.   Constitutional:       General: He is not in acute distress.     Appearance: He is not diaphoretic.   HENT:      Head: Normocephalic and atraumatic.      Nose: Nose normal.   Eyes:      General: No scleral icterus.        Right eye: No discharge.         Left eye: No discharge.      Conjunctiva/sclera: Conjunctivae normal.   Neck:      Trachea: No tracheal deviation.   Pulmonary:      Effort: Pulmonary effort is normal.   Musculoskeletal:        Arms:    Skin:     General: Skin is warm and dry.      Coloration: Skin is not pale.   Neurological:      Mental Status: He is alert and oriented to person, place, and time.   Psychiatric:         Behavior: Behavior normal.         Thought Content: Thought content normal.         Judgment: Judgment normal.     Osteopathic Structural Exam  Procedure Note for Osteopathic Manipulative Treatment    Pre-procedure diagnoses: Somatic dysfunctions as listed below.  Consent: Oral consent given for Osteopathic " Treatment  Post-procedure diagnoses: same  Complications of procedure: none, patient tolerated procedure well    The evaluation including the history, physical exam and the management decisions, indicate than an appropriate intervention on this date of service is osteopathic manipulative treatment. Oral permission for the osteopathic procedure was obtained. The following treatment methods and the responses for each body region are listed below.        Region Somatic Dysfunction Severity OMT technique Response      Thoracic  T9 FRSL Moderate  Balanced ligamentous tension   Reji percussion   Improved       Lumbar R QL spasm Moderate Balanced ligamentous tension  Vermilion percussion Improved      Rib Cage  R rib 10 exhalation somatic dysfunction  Moderate  Balanced ligamentous tension   Vermilion percussion Improved        Result Review :                     Assessment and Plan     Diagnoses and all orders for this visit:    1. Acute right-sided low back pain without sciatica (Primary)  -     Ambulatory Referral to Physical Therapy    2. Somatic dysfunction of spine, thoracic    3. Segmental and somatic dysfunction of rib cage    4. Somatic dysfunction of spine, lumbar    OMT to balance autonomic tone, improve fascial symmetry and respiratory/circulatory/lymphatic compliance  Continue PRN diclofenac         Follow Up     Return if symptoms worsen or fail to improve.  Patient was given instructions and counseling regarding his condition or for health maintenance advice. Please see specific information pulled into the AVS if appropriate.

## 2024-07-16 DIAGNOSIS — G40.909 NONINTRACTABLE EPILEPSY WITHOUT STATUS EPILEPTICUS, UNSPECIFIED EPILEPSY TYPE: ICD-10-CM

## 2024-07-16 RX ORDER — ZONISAMIDE 100 MG/1
CAPSULE ORAL
Qty: 90 CAPSULE | Refills: 0 | Status: SHIPPED | OUTPATIENT
Start: 2024-07-16

## 2024-07-16 NOTE — TELEPHONE ENCOUNTER
Rx Refill Note  Requested Prescriptions     Pending Prescriptions Disp Refills    zonisamide (ZONEGRAN) 100 MG capsule [Pharmacy Med Name: Zonisamide 100 MG Oral Capsule] 270 capsule 0     Sig: TAKE 3 CAPSULES BY MOUTH ONCE DAILY AT NIGHT      Last filled: 1/18/24 90 with 5 refills.  Last office visit with prescribing clinician: 1/25/2023      Next office visit with prescribing clinician: Visit date not found     Sent in 90 with 0 refills.  Patient needs appointment for further refills.    Payal Bonner MA  07/16/24, 09:50 EDT

## 2024-07-24 DIAGNOSIS — G40.909 NONINTRACTABLE EPILEPSY WITHOUT STATUS EPILEPTICUS, UNSPECIFIED EPILEPSY TYPE: ICD-10-CM

## 2024-07-24 RX ORDER — ZONISAMIDE 100 MG/1
100 CAPSULE ORAL 3 TIMES DAILY
Qty: 90 CAPSULE | Refills: 3 | Status: SHIPPED | OUTPATIENT
Start: 2024-07-24

## 2024-07-24 RX ORDER — LEVETIRACETAM 1000 MG/1
1000 TABLET ORAL 2 TIMES DAILY
Qty: 180 TABLET | Refills: 0 | OUTPATIENT
Start: 2024-07-24

## 2024-07-24 NOTE — TELEPHONE ENCOUNTER
SEIZURE MEDS    Caller: ANAHY García call back number: [phone]    PT NEEDS THE   ZONISAMIDE 100 MG   AND    LEVETRIACETAM 1000 MG     What was the call regarding: PT NEEDS REFILL . IT IS ON CHART BUT THROUGH OLD PROVIDER YOLANDA SWEENEY?       DO YOU NEED TO SEND IN A NEW SCRIPT? AND P.A ?     Is it okay if the provider responds through MyChart: CALL PT TO INFORM WHAT NEEDS TO BE DONE AND IF YOU HAVE CALLED  RX IN         [id number] - [place], [place] - [address] - [phone]  - [phone]  [phone]

## 2024-07-24 NOTE — TELEPHONE ENCOUNTER
Rx Refill Note  Requested Prescriptions     Pending Prescriptions Disp Refills    levETIRAcetam (KEPPRA) 1000 MG tablet [Pharmacy Med Name: levETIRAcetam 1000 MG Oral Tablet] 180 tablet 0     Sig: Take 1 tablet by mouth twice daily      Last filled: 1/18/24 60 with 5 refills.  Last office visit with prescribing clinician: 1/25/2023      Next office visit with prescribing clinician: Visit date not found     Payal Bonner MA  07/24/24, 13:07 EDT

## 2024-07-26 DIAGNOSIS — K21.9 GASTROESOPHAGEAL REFLUX DISEASE, UNSPECIFIED WHETHER ESOPHAGITIS PRESENT: ICD-10-CM

## 2024-07-26 RX ORDER — FAMOTIDINE 20 MG/1
20 TABLET, FILM COATED ORAL 2 TIMES DAILY
Qty: 180 TABLET | Refills: 0 | Status: SHIPPED | OUTPATIENT
Start: 2024-07-26

## 2024-07-31 ENCOUNTER — TREATMENT (OUTPATIENT)
Dept: PHYSICAL THERAPY | Facility: CLINIC | Age: 54
End: 2024-07-31
Payer: COMMERCIAL

## 2024-07-31 DIAGNOSIS — M25.69 DECREASED RANGE OF MOTION OF TRUNK AND BACK: ICD-10-CM

## 2024-07-31 DIAGNOSIS — G89.29 CHRONIC BILATERAL LOW BACK PAIN WITHOUT SCIATICA: Primary | ICD-10-CM

## 2024-07-31 DIAGNOSIS — M25.652 DECREASED RANGE OF LEFT HIP MOVEMENT: ICD-10-CM

## 2024-07-31 DIAGNOSIS — M54.50 CHRONIC BILATERAL LOW BACK PAIN WITHOUT SCIATICA: Primary | ICD-10-CM

## 2024-07-31 NOTE — PROGRESS NOTES
Physical Therapy Initial Evaluation and Plan of Care  [address] [place] [address]    Patient: Mayito Bassett               : 1970  Visit Date: 2024  Referring practitioner: Donald Ospina DO  Date of Initial Visit: 2024  Patient seen for 1 sessions    Visit Diagnoses:    ICD-10-CM ICD-9-CM   1. Chronic bilateral low back pain without sciatica  M54.50 724.2    G89.29 338.29   2. Decreased range of left hip movement  M25.652 719.55   3. Decreased range of motion of trunk and back  M25.69 719.59     Past Medical History:   Diagnosis Date    Anxiety     Arthritis     Asthma     Cancer     Chronic back pain     Congestive heart failure (CHF)     Depression     EKG abnormalities 2022    GERD (gastroesophageal reflux disease)     Obesity (BMI 30-39.9)     Seizures     Tendonitis     right wrist and hand, palms and fingers     Past Surgical History:   Procedure Laterality Date    CARDIAC CATHETERIZATION Left 2022    Procedure: Left Heart Cath;  Surgeon: Wil Bustos MD;  Location: [facility] CATH INVASIVE LOC[facility];  Service: Cardiology;  Laterality: Left;    CARDIAC CATHETERIZATION  2022    WRIST GANGLION EXCISION Left          SUBJECTIVE     Subjective Evaluation    History of Present Illness  Mechanism of injury: Pt reports having arthritis of the spine; however pt has also been told his back pain is just from continuous muscle strain. Pt has suffered from chronic back pain for a couple of years. Pt  suggested PT and pt said he would try anything. He does report occasional numbness and tingling that goes into the R leg. This occurs no more than once every 3 months or so. Pt has reported issues with L knee. Pt c/o of increasing pain with increased activity. 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 This makes it ifficult for him to get  into and out of car after work, and he has difficulty falling asleep d/t increased in pain. About 1 month ago, pt did go to the hospital for an increase in his back pain. He was prescribed pain patches and muscle relaxers. Pt doesn't notice a difference with muscle relaxers. Pain patches helped for short period of time.       Patient Occupation: Cook at [employer] and Work Restrictions: NoneQuality of life: good    Pain  Current pain ratin  At best pain rating: 3  At worst pain rating: 10  Location: Fluctuates between R, L, and central. Consistently in low back  Quality: pressure, dull ache and discomfort  Relieving factors: relaxation, rest, support and change in position (Most comfortable sidelying)  Aggravating factors: lifting, prolonged positioning and ambulation  Progression: no change    Social Support  Lives in: one-story house (4 steps)  Lives with: sister in poor health.    Diagnostic Tests  X-ray: abnormal (.)    Treatments  Previous treatment: medication  Patient Goals  Patient goals for therapy: decreased pain     XRAY findings:  thoracic  Impression   1. No acute fracture or malalignment.  2. Moderate chronic degenerative changes.   Lumbar spine  Impression   1. No acute fracture or malalignment.  2. Lumbar spondylosis. Loss of lumbar lordosis. A mild levoscoliosis.  3. Multilevel prominent disc osteophyte complexes, facet arthropathy and  resultant neural foraminal and spinal canal stenosis predominantly  involving the lower lumbar spine and lumbosacral junction.         Outcome Measure:   MOLBPQ: 18/50       OBJECTIVE     Objective          Static Posture     Head  Forward.    Shoulders  Rounded.    Lumbar Spine   Decreased lordosis.     Postural Observations  Seated posture: poor  Standing posture: poor      Palpation   Left   No palpable tenderness to the quadratus lumborum.   Hypertonic in the erector spinae.   Tenderness of the erector spinae.     Right   No palpable  tenderness to the erector spinae and quadratus lumborum.   Hypertonic in the erector spinae.     Tenderness     Lumbar Spine  Tenderness in the spinous process.     Neurological Testing     Sensation     Lumbar   Left   Intact: light touch    Right   Intact: light touch    Reflexes   Left   Patellar (L4): trace (1+)    Right   Patellar (L4): absent (0)    Additional Neurological Details  S!: unable to relax to test      Spine ROM     ROM  Pain / laterality    LUMBAR     Flexion  50% pain    Extension  5-10% pain    L lateral flexion  75%    R lateral flexion  75%    L Rotation  50%    R Rotation  50% In central R    *pain      Lower Extremity MMT and ROM    LEFT RIGHT   HIP Strength ROM Strength R ROM   flexion 5/5* with lifting leg  5/5    extension 4/5  4/5    ABD 4/5* in LB/hip area;  Started to compensate with hip flexion  5/5    ADD 5/5  5/5     IR  45  40   ER  38* in LB  45          KNEE       flexion 5/5  5/5    extension 5/5  5/5            ANKLE       dorsiflexion       plantarflexion       eversion       inversion       *pain      Therapy Education/Self Care 15481   Education offered today POC, nature of condition, anatomy related to issue   Medbride Code    Ongoing HEP   Will provide at upcoming session   Timed Minutes        Total Timed Treatment:     0   mins  Total Time of Visit:            60   mins    ASSESSMENT/PLAN     GOALS:  Goals                                          Progress Note due by 8/30                                                      Recert due by 10/29   LTG by: 6 weeks Comments Date Status   Pt will improve L Hip ROM to match R hip ROM      Modified oswestry to 5 /50 or less      Able to work a shift with pain no greater than a 3      Ind with HEP                      Assessment & Plan       Assessment  Impairments: abnormal gait, abnormal or restricted ROM, impaired balance, lacks appropriate home exercise program and pain with function   Functional limitations: carrying  objects, lifting, walking, uncomfortable because of pain, moving in bed and sitting   Assessment details: Mayito is a 53 year old male who suffers from chronic back pain. 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 Imaging shows lumbar spondylosis and loss of lordotic curvature which is attributing to his ongoing back pain. Today, pain was located more to the L of midline; however, pt reports it varies and smedays it is more R or more centrally located. Pt is hypomobile in is lumbar vertebrae and hypertonic in his erector spinae.   Prognosis: good    Plan  Therapy options: will be seen for skilled therapy services  Planned modality interventions: low level laser therapy, dry needling, TENS and high voltage pulsed current (pain management)  Planned therapy interventions: abdominal trunk stabilization, manual therapy, ADL retraining, neuromuscular re-education, postural training, soft tissue mobilization, strengthening, stretching, gait training, functional ROM exercises, home exercise program, therapeutic activities, joint mobilization, spinal/joint mobilization, flexibility and body mechanics training  Frequency: 2x week  Duration in weeks: 8  Treatment plan discussed with: patient  Plan details: Pt will be seen 2 x / week for 8 weeks for therapuetic exercise, therapeutic activities, neuromuscular reeducation, joint mobilization, manual therapy and modalities to address deficits above and decrease pain.       SIGNATURE: Meghan Eugene PT, [place] License #: [id number]  Electronically Signed on 7/31/2024      Initial Certification  Certification Period: 7/31/2024 through 10/28/2024  I certify that the therapy services are furnished while this patient is under my care.  The services outlined above are required by this patient, and will be reviewed every 90 days.     PHYSICIAN: Donald Ospina DO (NPI:  [id number])    Signature____________________________________________DATE: _________     Please sign and return via fax to [phone].   [email or web address]          [facility] [place]. [id number]  [phone]

## 2024-08-02 ENCOUNTER — TREATMENT (OUTPATIENT)
Dept: PHYSICAL THERAPY | Facility: CLINIC | Age: 54
End: 2024-08-02
Payer: COMMERCIAL

## 2024-08-02 DIAGNOSIS — M25.69 DECREASED RANGE OF MOTION OF TRUNK AND BACK: ICD-10-CM

## 2024-08-02 DIAGNOSIS — G89.29 CHRONIC BILATERAL LOW BACK PAIN WITHOUT SCIATICA: Primary | ICD-10-CM

## 2024-08-02 DIAGNOSIS — M54.50 CHRONIC BILATERAL LOW BACK PAIN WITHOUT SCIATICA: Primary | ICD-10-CM

## 2024-08-02 DIAGNOSIS — M25.652 DECREASED RANGE OF LEFT HIP MOVEMENT: ICD-10-CM

## 2024-08-02 NOTE — PROGRESS NOTES
The clinical instructor and/or supervising staff, Denis Bridges PT, was present in clinic guiding the visit by approving, concurring, and confirming the skilled judgement for all services rendered.    Signature:  Denis Bridges PT, [place] License #: [id number]  Electronically signed on 8/1/2024

## 2024-08-02 NOTE — PROGRESS NOTES
Physical Therapy Treatment Note  [address] [place] [address]    Patient: Mayito Bassett                                                 Visit Date: 2024  :     1970    Referring practitioner:    Donald Ospina DO  Date of Initial Visit:          Type: THERAPY  Noted: 2024    Patient seen for 2 sessions    Visit Diagnoses:    ICD-10-CM ICD-9-CM   1. Chronic bilateral low back pain without sciatica  M54.50 724.2    G89.29 338.29   2. Decreased range of left hip movement  M25.652 719.55   3. Decreased range of motion of trunk and back  M25.69 719.59     SUBJECTIVE     Subjective: Pt arrives and states he's not feeling too bad. Pt was able to push mow this morning without pain. Pain comes and goes. He also states he did not work yesterday, which could have contributed to decreased back pain.       PAIN: 2/10         OBJECTIVE     Objective     Therapeutic Exercises    16796 Units Comments   DKTC with orange theraball 2 min    LTR from hooklying 2 min    SKTC   Feels better on back v with theraball   Hooklying hip ER     PPT  Attempted supine and seated; difficult comprehension and increased pain. D/C   Seated theraball roll out forwards, backwards, side 1 x 10 all directions              Timed Minutes 23      Manual Therapy     32341  Comments   STM R>L to thoracolumbar junction                    Timed Minutes 10            Therapy Education/Self Care [id number]   Education offered today Educated pt on importance of posture and demonstrated effects of poor posture with spine model    MedLean Traine Code  Access Code: [email or web address]   Ongoing HEP   Access Code: [email or web address]  URL: [email or web address]  Date: 2024  Prepared by: Meghan Eugene    Exercises  - Supine Lower Trunk Rotation  - 1 x daily - 7 x weekly - 3 sets - 10 reps  - Supine Piriformis Stretch with Foot on Ground  - 1 x daily - 7 x weekly - 3 sets - 10 reps  - Supine  Single Knee to Chest Stretch  - 1 x daily - 7 x weekly - 3 sets - 10 reps   Timed Minutes  8         Total Timed Treatment:     41   mins  Total Time of Visit:            41   mins     ASSESSMENT/PLAN      GOALS:        Goals                                          Progress Note due by 8/30                                                      Recert due by 10/29   LTG by: 6 weeks Comments Date Status   Pt will improve L Hip ROM to match R hip ROM  L ER <R ER  8/2     Modified oswestry to 5 /50 or less         Able to work a shift with pain no greater than a 3         Ind with HEP                                  Assessment/Plan     ASSESSMENT:   Today is pt's first session since IE. He is feeling alright in his back today. Started with gentle ROM and educated pt about postural training. Provided pt with HEP.     PLAN:   Address postural issues and provide postural exercises at next session. Provide manual therapy/modalities as needed for pain. Increase trunk ROM    SIGNATURE: Meghan Eugene, PT, [place] License #: [id number]  Electronically Signed on 8/2/2024        June Gilbert  [place] [address]  [phone]

## 2024-08-14 DIAGNOSIS — G40.909 NONINTRACTABLE EPILEPSY WITHOUT STATUS EPILEPTICUS, UNSPECIFIED EPILEPSY TYPE: ICD-10-CM

## 2024-08-14 RX ORDER — LEVETIRACETAM 1000 MG/1
1000 TABLET ORAL 2 TIMES DAILY
Qty: 60 TABLET | Refills: 5 | Status: SHIPPED | OUTPATIENT
Start: 2024-08-14

## 2024-08-14 NOTE — TELEPHONE ENCOUNTER
MEDICATION REFILL REQUEST    Caller: Serjio Mayito Mario    Relationship: Self    Best call back number: [phone]    Requested Prescriptions:   Requested Prescriptions     Pending Prescriptions Disp Refills    levETIRAcetam (KEPPRA) 1000 MG tablet 60 tablet 5     Sig: Take 1 tablet by mouth 2 (Two) Times a Day.      Pharmacy where request should be sent: [id number] - [place], [place] - [address] [facility] [phone] [facility] [phone] FX     Last office visit with prescribing clinician: 3/19/2024   Last telemedicine visit with prescribing clinician: Visit date not found   Next office visit with prescribing clinician: 3/21/2025     Additional details provided by patient: RX WAS PREVIOUSLY BEING FILLED BY PT'S PRIOR NEUROLOGIST IN [place], NITHIN ISAAC OF [facility] NEURO [place]. PT STATES HE HAS A COUPLE OF WEEKS OF THE MEDICATION LEFT AND ASKS IF NITHIN ANGLIN WILL TAKE OVER RX. PT ASKS FOR 1 YEARS SUPPLY OF THE MEDICATION/OR AT LEAST TO LAST UNTIL PT'S F/U APPT ON 3/21/2025.    Does the patient have less than a 3 day supply?:  [] Yes  [x] No    Would you like a call back once the refill request has been completed?: [] Yes  [x] No    If the office needs to give you a call back, can they leave a voicemail?: [] Yes  [x] No    PLEASE REVIEW AND ADVISE.    Hunter Suarez Rep   08/14/24 09:10 CDT

## 2024-08-15 ENCOUNTER — TREATMENT (OUTPATIENT)
Dept: PHYSICAL THERAPY | Facility: CLINIC | Age: 54
End: 2024-08-15
Payer: COMMERCIAL

## 2024-08-15 DIAGNOSIS — M25.652 DECREASED RANGE OF LEFT HIP MOVEMENT: ICD-10-CM

## 2024-08-15 DIAGNOSIS — M54.50 CHRONIC BILATERAL LOW BACK PAIN WITHOUT SCIATICA: Primary | ICD-10-CM

## 2024-08-15 DIAGNOSIS — G89.29 CHRONIC BILATERAL LOW BACK PAIN WITHOUT SCIATICA: Primary | ICD-10-CM

## 2024-08-15 DIAGNOSIS — M25.69 DECREASED RANGE OF MOTION OF TRUNK AND BACK: ICD-10-CM
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
## 2024-09-12 ENCOUNTER — OFFICE VISIT (OUTPATIENT)
Dept: CARDIOLOGY | Facility: CLINIC | Age: 54
End: 2024-09-12
Payer: COMMERCIAL

## 2024-09-12 VITALS
HEIGHT: 67 IN | RESPIRATION RATE: 18 BRPM | DIASTOLIC BLOOD PRESSURE: 75 MMHG | HEART RATE: 67 BPM | BODY MASS INDEX: 34.84 KG/M2 | WEIGHT: 222 LBS | SYSTOLIC BLOOD PRESSURE: 120 MMHG | OXYGEN SATURATION: 99 %

## 2024-09-12 DIAGNOSIS — I10 PRIMARY HYPERTENSION: ICD-10-CM

## 2024-09-12 DIAGNOSIS — Z72.0 TOBACCO ABUSE: ICD-10-CM

## 2024-09-12 DIAGNOSIS — I50.22 CHRONIC SYSTOLIC (CONGESTIVE) HEART FAILURE: Primary | ICD-10-CM

## 2024-09-12 DIAGNOSIS — R00.1 BRADYCARDIA, SINUS: ICD-10-CM

## 2024-09-12 NOTE — PROGRESS NOTES
Subjective:     Encounter Date:09/12/2024      Patient ID: Mayito Bassett is a 53 y.o. male     Chief Complaint:  Congestive Heart Failure  Presents for follow-up visit. Associated symptoms include fatigue and shortness of breath. Pertinent negatives include no abdominal pain, chest pain, near-syncope or palpitations. Compliance with diet is %. Compliance with exercise is %. Compliance with medications is %.     Patient presents today for routine cardiology follow up. Patient is followed for congestive heart failure, sinus bradycardia, nonobstructive coronary artery disease. He previously followed with [facility] team. Of note he came to see Dr. Allred in July of 2022 for bradycardia and abnormal EKG. He had a holter monitor which showed average heart rate of 57 and brief runs of PAT. He had a stress echo which was low risk but LVEF was estimated to be 41-45%. He had a heart cath in Decmber 2022 with [place] cardiology with showed mild nonobstructive disease. Patient has seizures and previously followed with neurology in [place]. He has GERD, hyperlipidemia, hypertension, obesity and sinus bradycardia. Patient's last echo was in February 2024 which showed LVEF 41-45%, trace MR. He has bradycardia and therefore is not on a beta blocker. Overall patient is stable. Chronic shortness of breath but stable. Denies volume overload. Occasional dizziness. He does follow up with Dr. Ospina as his PCP.      The following portions of the patient's history were reviewed and updated as appropriate: allergies, current medications, past medical history, past social history, past and problem list.    No Known Allergies    Current Outpatient Medications:     aspirin 81 MG EC tablet, Take 1 tablet by mouth Daily., Disp: 90 tablet, Rfl: 3    cyclobenzaprine (FLEXERIL) 10 MG tablet, Take 1 tablet by mouth At Night As Needed., Disp: , Rfl:     dapagliflozin Propanediol 10 MG tablet,  Take 10 mg by mouth Daily., Disp: 90 tablet, Rfl: 3    diclofenac (VOLTAREN) 50 MG EC tablet, Take 1 tablet by mouth 2 (Two) Times a Day As Needed (pain)., Disp: 12 tablet, Rfl: 0    famotidine (PEPCID) 20 MG tablet, Take 1 tablet by mouth twice daily, Disp: 180 tablet, Rfl: 0    levETIRAcetam (KEPPRA) 1000 MG tablet, Take 1 tablet by mouth 2 (Two) Times a Day., Disp: 60 tablet, Rfl: 5    lidocaine (LIDODERM) 5 %, Place 1 patch on the skin as directed by provider Daily. Remove & Discard patch within 12 hours or as directed by MD, Disp: 30 each, Rfl: 0    Multiple Vitamin (MULTI VITAMIN MENS PO), Take  by mouth., Disp: , Rfl:     sacubitril-valsartan (ENTRESTO) 49-51 MG tablet, Take 1 tablet by mouth 2 (Two) Times a Day., Disp: 60 tablet, Rfl: 11    spironolactone (ALDACTONE) 25 MG tablet, Take 1 tablet by mouth Daily., Disp: 30 tablet, Rfl: 11    tiZANidine (Zanaflex) 4 MG tablet, Take 1 tablet by mouth Every 8 (Eight) Hours As Needed for Muscle Spasms., Disp: 12 tablet, Rfl: 0    traZODone (DESYREL) 100 MG tablet, Take one tablet at bedtime, Disp: 90 tablet, Rfl: 1    zonisamide (ZONEGRAN) 100 MG capsule, Take 1 capsule by mouth 3 times a day., Disp: 90 capsule, Rfl: 3    Social History     Socioeconomic History    Marital status: Single   Tobacco Use    Smoking status: Every Day     Current packs/day: 1.00     Average packs/day: 1 pack/day for 37.0 years (37.0 ttl pk-yrs)     Types: Cigarettes    Smokeless tobacco: Never   Vaping Use    Vaping status: Never Used   Substance and Sexual Activity    Alcohol use: Yes     Comment: 1 A MONTH    Drug use: Never     Types: Marijuana     Comment: YEARS AGO    Sexual activity: Not Currently     Partners: Female     Birth control/protection: None       Review of Systems   Constitutional: Positive for fatigue and malaise/fatigue. Negative for chills, decreased appetite, fever, weight gain and weight loss.   HENT:  Negative for nosebleeds.    Eyes:  Negative for visual  disturbance.   Cardiovascular:  Positive for dyspnea on exertion. Negative for chest pain, leg swelling, near-syncope, orthopnea, palpitations, paroxysmal nocturnal dyspnea and syncope.   Respiratory:  Positive for shortness of breath. Negative for cough, hemoptysis and snoring.    Endocrine: Negative for cold intolerance and heat intolerance.   Hematologic/Lymphatic: Negative for bleeding problem. Does not bruise/bleed easily.   Skin:  Negative for rash.   Musculoskeletal:  Negative for back pain and falls.   Gastrointestinal:  Negative for abdominal pain, constipation, diarrhea, heartburn, melena, nausea and vomiting.   Genitourinary:  Negative for hematuria.   Neurological:  Negative for dizziness, headaches and light-headedness.   Psychiatric/Behavioral:  Negative for altered mental status.    Allergic/Immunologic: Negative for persistent infections.              Objective:     Constitutional:       Appearance: Healthy appearance. Well-developed and not in distress.   Eyes:      Pupils: Pupils are equal, round, and reactive to light.   HENT:      Head: Normocephalic and atraumatic.   Neck:      Vascular: No carotid bruit or JVD.   Pulmonary:      Effort: Pulmonary effort is normal.      Breath sounds: Normal breath sounds.   Cardiovascular:      Bradycardia present. Regular rhythm.      Murmurs: There is no murmur.   Pulses:     Intact distal pulses.   Edema:     Peripheral edema absent.   Abdominal:      General: Bowel sounds are normal.      Palpations: Abdomen is soft.   Musculoskeletal: Normal range of motion.      Cervical back: Normal range of motion and neck supple. Skin:     General: Skin is warm and dry.   Neurological:      Mental Status: Alert and oriented to person, place, and time.      Deep Tendon Reflexes: Reflexes are normal and symmetric.   Psychiatric:         Behavior: Behavior normal.         Thought Content: Thought content normal.         Judgment: Judgment normal.           Procedures  BP  "120/75 (BP Location: Right arm, Patient Position: Sitting, Cuff Size: Adult)   Pulse 67   Resp 18   Ht 170.2 cm (67\")   Wt 101 kg (222 lb)   SpO2 99%   BMI 34.77 kg/m²   Lab Review:   I have reviewed   Lab Results   Component Value Date    GLUCOSE 154 (H) 04/11/2024    CALCIUM 9.4 04/11/2024     04/11/2024    K 3.7 04/11/2024    CO2 26.0 04/11/2024     04/11/2024    BUN 19 04/11/2024    CREATININE 0.88 04/11/2024    EGFR 102.2 04/11/2024    BCR 21.6 04/11/2024    ANIONGAP 9.0 04/11/2024          Lab Results   Component Value Date    CHOL 134 02/07/2024    TRIG 136 02/07/2024    HDL 40 02/07/2024    LDL 70 02/07/2024      Results for orders placed during the hospital encounter of 02/27/24    Adult Transthoracic Echo Complete W/ Cont if Necessary Per Protocol    Interpretation Summary    Left ventricular systolic function is mildly decreased. Left ventricular ejection fraction appears to be 41 - 45%.    The right ventricular cavity is moderately dilated. Normal right ventricular systolic function noted.    The left atrial cavity is mildly dilated.    Trace mitral valve regurgitation is present.    The following segments are hypokinetic: apical anterior, apical septal, apical inferior, apical lateral and apex.     Assessment:          Diagnosis Plan   1. Chronic systolic (congestive) heart failure        2. Bradycardia, sinus        3. Primary hypertension        4. Tobacco abuse                     Plan:       Chronic Systolic Congestive Heart Failure - continue Entresto 49-51 BID, Farxiga 10 mg daily and Aldactone 25 mg daily. Not on beta blocker due to bradycardia. Low Salt Diet. Stable.   Sinus Bradycardia- asymptomatic and stable.   Hypertension - Continue Entresto 49-51 mg BID. Continue Aldactone 25 mg daily. Blood Pressure stable.    Mayitomindi Bassett  reports that he has been smoking cigarettes. He has a 37 pack-year smoking history. He has never used smokeless tobacco. I have educated him " on the risk of diseases from using tobacco products such as cancer, COPD, and heart disease.     I advised him to quit and he is not willing to quit.    I spent 4 minutes counseling the patient.    Follow up in 6 months.

## 2024-09-24 DIAGNOSIS — K21.9 GASTROESOPHAGEAL REFLUX DISEASE, UNSPECIFIED WHETHER ESOPHAGITIS PRESENT: ICD-10-CM

## 2024-09-24 RX ORDER — FAMOTIDINE 20 MG/1
20 TABLET, FILM COATED ORAL 2 TIMES DAILY
Qty: 180 TABLET | Refills: 0 | Status: SHIPPED | OUTPATIENT
Start: 2024-09-24

## 2024-09-30 ENCOUNTER — OFFICE VISIT (OUTPATIENT)
Dept: FAMILY MEDICINE CLINIC | Facility: CLINIC | Age: 54
End: 2024-09-30
Payer: COMMERCIAL

## 2024-09-30 ENCOUNTER — TELEPHONE (OUTPATIENT)
Dept: FAMILY MEDICINE CLINIC | Facility: CLINIC | Age: 54
End: 2024-09-30

## 2024-09-30 VITALS
BODY MASS INDEX: 36.26 KG/M2 | OXYGEN SATURATION: 98 % | HEIGHT: 67 IN | RESPIRATION RATE: 18 BRPM | DIASTOLIC BLOOD PRESSURE: 84 MMHG | HEART RATE: 85 BPM | WEIGHT: 231 LBS | TEMPERATURE: 96.9 F | SYSTOLIC BLOOD PRESSURE: 130 MMHG

## 2024-09-30 DIAGNOSIS — Z23 FLU VACCINE NEED: ICD-10-CM

## 2024-09-30 DIAGNOSIS — M54.42 CHRONIC LEFT-SIDED LOW BACK PAIN WITH LEFT-SIDED SCIATICA: Primary | ICD-10-CM

## 2024-09-30 DIAGNOSIS — G89.29 CHRONIC LEFT-SIDED LOW BACK PAIN WITH LEFT-SIDED SCIATICA: Primary | ICD-10-CM

## 2024-09-30 PROCEDURE — 99214 OFFICE O/P EST MOD 30 MIN: CPT | Performed by: FAMILY MEDICINE

## 2024-09-30 PROCEDURE — 90656 IIV3 VACC NO PRSV 0.5 ML IM: CPT | Performed by: FAMILY MEDICINE

## 2024-09-30 PROCEDURE — 1159F MED LIST DOCD IN RCRD: CPT | Performed by: FAMILY MEDICINE

## 2024-09-30 PROCEDURE — 90471 IMMUNIZATION ADMIN: CPT | Performed by: FAMILY MEDICINE

## 2024-09-30 PROCEDURE — 1160F RVW MEDS BY RX/DR IN RCRD: CPT | Performed by: FAMILY MEDICINE

## 2024-09-30 PROCEDURE — 1126F AMNT PAIN NOTED NONE PRSNT: CPT | Performed by: FAMILY MEDICINE

## 2024-09-30 RX ORDER — GABAPENTIN 300 MG/1
300 CAPSULE ORAL 3 TIMES DAILY
Qty: 90 CAPSULE | Refills: 2 | Status: SHIPPED | OUTPATIENT
Start: 2024-09-30

## 2024-09-30 NOTE — TELEPHONE ENCOUNTER
PT ASKED IF HE COULD GET A NOTE FROM DR CASTILLO STATING HE NEEDS TWO DAYS OFF TO SEE HOW GABAPENTIN WORKS FOR HIM SINCE IT'S A NARCOTIC AND HIS WORKPLACE IS REQUIRING IT. PLEASE ADVISE AND CALL PATIENT BACK.

## 2024-09-30 NOTE — PROGRESS NOTES
"Chief Complaint  Follow-up (3 month follow up )    Subjective        Mayito Bassett presents to [facility]  History of Present Illness  Having more low back pain  Was getting worse from PT  No having radicular pain down L lateral thigh not to knee  Pain not controlled with NSAIDs    Objective   Vital Signs:  /84   Pulse 85   Temp 96.9 °F (36.1 °C)   Resp 18   Ht 170.2 cm (67\")   Wt 105 kg (231 lb)   SpO2 98%   BMI 36.18 kg/m²   Estimated body mass index is 36.18 kg/m² as calculated from the following:    Height as of this encounter: 170.2 cm (67\").    Weight as of this encounter: 105 kg (231 lb).        Physical Exam  Vitals and nursing note reviewed.   Constitutional:       General: He is not in acute distress.     Appearance: He is not diaphoretic.   HENT:      Head: Normocephalic and atraumatic.      Nose: Nose normal.   Eyes:      General: No scleral icterus.        Right eye: No discharge.         Left eye: No discharge.      Conjunctiva/sclera: Conjunctivae normal.   Neck:      Trachea: No tracheal deviation.   Pulmonary:      Effort: Pulmonary effort is normal.   Skin:     General: Skin is warm and dry.      Coloration: Skin is not pale.   Neurological:      Mental Status: He is alert and oriented to person, place, and time.      Sensory: No sensory deficit.      Motor: No weakness.      Coordination: Coordination normal.   Psychiatric:         Behavior: Behavior normal.         Thought Content: Thought content normal.         Judgment: Judgment normal.        Result Review :                Assessment and Plan   Diagnoses and all orders for this visit:    1. Chronic left-sided low back pain with left-sided sciatica (Primary)  -     MRI Lumbar Spine Without Contrast; Future  -     Ambulatory Referral to Pain Management  -     gabapentin (NEURONTIN) 300 MG capsule; Take 1 capsule by mouth 3 (Three) Times a Day.  Dispense: 90 capsule; Refill: 2    2. Flu " vaccine need  -     Fluzone >6mos (1414-0487)             Follow Up   Return in about 3 months (around 12/30/2024) for Recheck.  Patient was given instructions and counseling regarding his condition or for health maintenance advice. Please see specific information pulled into the AVS if appropriate.

## 2024-10-01 NOTE — TELEPHONE ENCOUNTER
I'm not sure that is necessary given the short acting nature of the medication. It is written for 3 times daily, but he could start by taking it in th evening, one dose per day, and see how it affects him.

## 2024-10-21 ENCOUNTER — HOSPITAL ENCOUNTER (EMERGENCY)
Facility: HOSPITAL | Age: 54
Discharge: HOME | End: 2024-10-21
Payer: COMMERCIAL

## 2024-10-21 VITALS
OXYGEN SATURATION: 100 % | TEMPERATURE: 97.6 F | HEIGHT: 68 IN | BODY MASS INDEX: 35.61 KG/M2 | HEART RATE: 61 BPM | RESPIRATION RATE: 16 BRPM | DIASTOLIC BLOOD PRESSURE: 57 MMHG | WEIGHT: 235 LBS | SYSTOLIC BLOOD PRESSURE: 109 MMHG

## 2024-10-21 DIAGNOSIS — T26.42XA: Primary | ICD-10-CM

## 2024-10-21 PROCEDURE — 99283 EMERGENCY DEPT VISIT LOW MDM: CPT

## 2024-10-21 RX ORDER — ERYTHROMYCIN 5 MG/G
OINTMENT OPHTHALMIC
Qty: 1 G | Refills: 0 | Status: SHIPPED | OUTPATIENT
Start: 2024-10-21

## 2024-10-21 RX ORDER — GINSENG 100 MG
CAPSULE ORAL 2 TIMES DAILY
Qty: 14 G | Refills: 0 | Status: SHIPPED | OUTPATIENT
Start: 2024-10-21

## 2024-10-21 RX ADMIN — FLUORESCEIN SODIUM 1 STRIP: 1 STRIP OPHTHALMIC at 18:58
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
## 2024-10-28 ENCOUNTER — TELEPHONE (OUTPATIENT)
Dept: FAMILY MEDICINE CLINIC | Facility: CLINIC | Age: 54
End: 2024-10-28
Payer: COMMERCIAL

## 2024-10-28 NOTE — TELEPHONE ENCOUNTER
Caller: Mayito Bassett    Relationship: Self    Best call back number:  [phone]     What medication are you requesting: SOMETHING TO CALM NERVES FOR WHEN HE HAS HIS MRI BACK ON NOVEMBER 11TH.  WILL BE DONE AT  [facility].     What are your current symptoms:  SEE ABOVE    How long have you been experiencing symptoms:  ANXIETY    Have you had these symptoms before:    [x] Yes  [] No    Have you been treated for these symptoms before:   [x] Yes  [] No    If a prescription is needed, what is your preferred pharmacy and phone number:      Additional notes: HE HAD TO CANCEL TOMORROW  DUE TO ANXIETY BUT HAS RESCHEDULED FOR 11/11/2024.

## 2024-10-28 NOTE — PROGRESS NOTES
Larissa Marte,   [facility]   Family Medicine  [address]  [place] [address]  Phone: [phone]  Fax: [phone]         Chief Complaint:  Chief Complaint   Patient presents with    Anxiety     Patient presents to clinic to discuss getting medication to be Rx'd prior to MRI. Patient states he is afraid of enclosed spaces.  Patient's MRI scheduled for 11/11/24.        History:  Mayito Bassett is a 54 y.o. male who presents for acute appointment.  Patient has upcoming MRI and would like something to take before his testing.  Says he has a history of claustrophobia, and is needed medication prior to previous MRIs.  He initially had it scheduled for today, but had to postpone due to his anxiety.    HPI           reports that he has been smoking cigarettes. He has a 37 pack-year smoking history. He has never used smokeless tobacco. He reports current alcohol use. He reports that he does not use drugs.    Current Outpatient Medications   Medication Instructions    aspirin 81 mg, Oral, Daily    bacitracin 500 UNIT/GM ointment Topical, 2 Times Daily, Under the left eye    cyclobenzaprine (FLEXERIL) 10 mg, Nightly PRN    dapagliflozin Propanediol 10 mg, Oral, Daily    diazePAM (VALIUM) 5 mg, Oral, Once, Prior to MRI    diclofenac (VOLTAREN) 50 mg, Oral, 2 Times Daily PRN    erythromycin (ROMYCIN) 5 MG/GM ophthalmic ointment Left Eye, Every 4 Hours While Awake    famotidine (PEPCID) 20 mg, Oral, 2 Times Daily    gabapentin (NEURONTIN) 300 mg, Oral, 3 Times Daily    levETIRAcetam (KEPPRA) 1,000 mg, Oral, 2 Times Daily    lidocaine (LIDODERM) 5 % 1 patch, Transdermal, Every 24 Hours, Remove & Discard patch within 12 hours or as directed by MD    Multiple Vitamin (MULTI VITAMIN MENS PO) Take  by mouth.    sacubitril-valsartan (ENTRESTO) 49-51 MG tablet 1 tablet, Oral, 2 Times Daily    spironolactone (ALDACTONE) 25 mg, Oral, Daily    tiZANidine (ZANAFLEX) 4 mg, Oral, Every 8 Hours PRN  "   traZODone (DESYREL) 100 MG tablet Take one tablet at bedtime    zonisamide (ZONEGRAN) 100 mg, Oral, 3 times daily       OBJECTIVE:  /90 (BP Location: Right arm, Patient Position: Sitting, Cuff Size: Adult)   Pulse 66   Temp 97.3 °F (36.3 °C) (Temporal)   Ht 172.7 cm (68\")   Wt 108 kg (237 lb)   SpO2 99%   BMI 36.04 kg/m²      Gen: No acute distress.   Head and neck: Normocephalic, atraumatic.  HEENT: PERRLA, EOMI.  CV: Well perfused, no pallor.   Resp: Normal respiratory effort. No distress.   Musculoskeletal: No gross deformity.   Neuro: Alert and oriented.   Skin: No visible rash or erythema.   Psych: Appropriate.       Procedures    Assessment/Plan:     Diagnoses and all orders for this visit:    Chronic left-sided low back pain with left-sided sciatica  Phobia, unspecified type (Primary)  Patient claustrophobic and asking for medication to take prior to his upcoming MRI.  PDMP reviewed.  Will prescribe single dose Valium, to take prior to testing.  -     diazePAM (Valium) 5 MG tablet; Take 1 tablet by mouth 1 time for 1 dose. Prior to MRI  Dispense: 1 tablet; Refill: 0        An After Visit Summary was printed and given to the patient at discharge.  Return if symptoms worsen or fail to improve.         Larissa Marte DO  10/29/2024   Electronically signed.  "

## 2024-10-29 ENCOUNTER — OFFICE VISIT (OUTPATIENT)
Dept: FAMILY MEDICINE CLINIC | Facility: CLINIC | Age: 54
End: 2024-10-29
Payer: COMMERCIAL

## 2024-10-29 VITALS
TEMPERATURE: 97.3 F | HEIGHT: 68 IN | BODY MASS INDEX: 35.92 KG/M2 | WEIGHT: 237 LBS | SYSTOLIC BLOOD PRESSURE: 120 MMHG | OXYGEN SATURATION: 99 % | DIASTOLIC BLOOD PRESSURE: 90 MMHG | HEART RATE: 66 BPM

## 2024-10-29 DIAGNOSIS — M54.42 CHRONIC LEFT-SIDED LOW BACK PAIN WITH LEFT-SIDED SCIATICA: ICD-10-CM

## 2024-10-29 DIAGNOSIS — G89.29 CHRONIC LEFT-SIDED LOW BACK PAIN WITH LEFT-SIDED SCIATICA: ICD-10-CM

## 2024-10-29 DIAGNOSIS — F40.9 PHOBIA, UNSPECIFIED TYPE: Primary | ICD-10-CM

## 2024-10-29 PROCEDURE — 99213 OFFICE O/P EST LOW 20 MIN: CPT

## 2024-10-29 PROCEDURE — 1159F MED LIST DOCD IN RCRD: CPT

## 2024-10-29 PROCEDURE — 1126F AMNT PAIN NOTED NONE PRSNT: CPT

## 2024-10-29 PROCEDURE — 1160F RVW MEDS BY RX/DR IN RCRD: CPT

## 2024-10-29 RX ORDER — DIAZEPAM 5 MG/1
5 TABLET ORAL ONCE
Qty: 1 TABLET | Refills: 0 | Status: SHIPPED | OUTPATIENT
Start: 2024-10-29 | End: 2024-10-29

## 2024-11-11 ENCOUNTER — HOSPITAL ENCOUNTER (OUTPATIENT)
Dept: MRI IMAGING | Facility: HOSPITAL | Age: 54
Discharge: HOME | End: 2024-11-11
Admitting: FAMILY MEDICINE
Payer: COMMERCIAL

## 2024-11-11 DIAGNOSIS — M54.42 CHRONIC LEFT-SIDED LOW BACK PAIN WITH LEFT-SIDED SCIATICA: ICD-10-CM

## 2024-11-11 DIAGNOSIS — G89.29 CHRONIC LEFT-SIDED LOW BACK PAIN WITH LEFT-SIDED SCIATICA: ICD-10-CM

## 2024-11-11 PROCEDURE — 72148 MRI LUMBAR SPINE W/O DYE: CPT

## 2024-11-13 ENCOUNTER — TELEPHONE (OUTPATIENT)
Dept: FAMILY MEDICINE CLINIC | Facility: CLINIC | Age: 54
End: 2024-11-13
Payer: COMMERCIAL

## 2024-11-13 DIAGNOSIS — K21.9 GASTROESOPHAGEAL REFLUX DISEASE, UNSPECIFIED WHETHER ESOPHAGITIS PRESENT: ICD-10-CM

## 2024-11-13 DIAGNOSIS — M54.9 UPPER BACK PAIN: Primary | ICD-10-CM

## 2024-11-13 RX ORDER — CYCLOBENZAPRINE HCL 10 MG
10 TABLET ORAL NIGHTLY PRN
Status: CANCELLED | OUTPATIENT
Start: 2024-11-13

## 2024-11-13 RX ORDER — FAMOTIDINE 20 MG/1
20 TABLET, FILM COATED ORAL 2 TIMES DAILY
Qty: 180 TABLET | Refills: 0 | Status: CANCELLED | OUTPATIENT
Start: 2024-11-13

## 2024-11-13 NOTE — TELEPHONE ENCOUNTER
Caller: Mayito Bassett    Relationship: Self    Best call back number: [phone]     Requested Prescriptions:   Requested Prescriptions     Pending Prescriptions Disp Refills    cyclobenzaprine (FLEXERIL) 10 MG tablet       Sig: Take 1 tablet by mouth At Night As Needed.    famotidine (PEPCID) 20 MG tablet 180 tablet 0     Sig: Take 1 tablet by mouth 2 (Two) Times a Day.        Pharmacy where request should be sent: [facility] #[id number] - [place], [place] - [address] [facility] [phone] [facility] [phone] FX     Last office visit with prescribing clinician: 9/30/2024   Last telemedicine visit with prescribing clinician: Visit date not found   Next office visit with prescribing clinician: 12/30/2024     Additional details provided by patient:     Does the patient have less than a 3 day supply:  [] Yes  [x] No    Would you like a call back once the refill request has been completed: [] Yes [x] No    If the office needs to give you a call back, can they leave a voicemail: [] Yes [x] No    David Martini III, RegVicki Rep   11/13/24 13:00 CST

## 2024-11-13 NOTE — TELEPHONE ENCOUNTER
Caller: Mayito Bassett    Relationship: Self    Best call back number: [phone]     Caller requesting test results: SELF    What test was performed: MRI    When was the test performed: 11-11-24    Where was the test performed: [facility]    Additional notes: WOULD LIKE RESULTS. WAS NOTIFIED WAS IN MYCHART UNABLE TO ACCESS

## 2024-11-14 DIAGNOSIS — G40.909 NONINTRACTABLE EPILEPSY WITHOUT STATUS EPILEPTICUS, UNSPECIFIED EPILEPSY TYPE: ICD-10-CM

## 2024-11-14 RX ORDER — ZONISAMIDE 100 MG/1
100 CAPSULE ORAL 3 TIMES DAILY
Qty: 90 CAPSULE | Refills: 3 | Status: SHIPPED | OUTPATIENT
Start: 2024-11-14

## 2024-11-14 RX ORDER — LEVETIRACETAM 1000 MG/1
1000 TABLET ORAL 2 TIMES DAILY
Qty: 60 TABLET | Refills: 5 | OUTPATIENT
Start: 2024-11-14

## 2024-11-14 NOTE — TELEPHONE ENCOUNTER
Pt had a refill sent in in August with 5 refills. Pt should have plenty of medication until at least January 2025.

## 2024-11-14 NOTE — TELEPHONE ENCOUNTER
Caller: Mayito Bassett    Relationship: Self    Best call back number:   Telephone Information:   Mobile [phone]      Requested Prescriptions:   Requested Prescriptions     Pending Prescriptions Disp Refills    zonisamide (ZONEGRAN) 100 MG capsule 90 capsule 3     Sig: Take 1 capsule by mouth 3 times a day.        Pharmacy where request should be sent: [facility] #[id number] - [place], [place] - [address] [facility] [phone] [facility] [phone]      Last office visit with prescribing clinician: 3/19/2024   Last telemedicine visit with prescribing clinician: Visit date not found   Next office visit with prescribing clinician: 11/14/2024     Additional details provided by patient: PT HAS ABOUT 5 DAYS LEFT    Does the patient have less than a 3 day supply:  [] Yes  [x] No    Would you like a call back once the refill request has been completed: [] Yes [] No    If the office needs to give you a call back, can they leave a voicemail: [] Yes [] No    Hunter Carter Rep   11/14/24 08:52 CST

## 2024-11-18 RX ORDER — CYCLOBENZAPRINE HCL 10 MG
10 TABLET ORAL NIGHTLY PRN
Qty: 30 TABLET | Refills: 0 | Status: SHIPPED | OUTPATIENT
Start: 2024-11-18

## 2024-11-18 RX ORDER — FAMOTIDINE 20 MG/1
20 TABLET, FILM COATED ORAL 2 TIMES DAILY
Qty: 180 TABLET | Refills: 0 | Status: SHIPPED | OUTPATIENT
Start: 2024-11-18

## 2024-11-26 DIAGNOSIS — I50.22 CHRONIC SYSTOLIC (CONGESTIVE) HEART FAILURE: ICD-10-CM

## 2024-11-26 RX ORDER — DAPAGLIFLOZIN 10 MG/1
1 TABLET, FILM COATED ORAL DAILY
Qty: 90 TABLET | Refills: 3 | Status: SHIPPED | OUTPATIENT
Start: 2024-11-26

## 2024-12-04 ENCOUNTER — TELEPHONE (OUTPATIENT)
Dept: FAMILY MEDICINE CLINIC | Facility: CLINIC | Age: 54
End: 2024-12-04
Payer: COMMERCIAL

## 2024-12-04 NOTE — TELEPHONE ENCOUNTER
Spoke with patient and informed him that pain medication will be taking over these types of medications again

## 2024-12-04 NOTE — TELEPHONE ENCOUNTER
Caller: Mayito Bassett    Relationship: Self    Best call back number: [phone]    Who are you requesting to speak with (clinical staff, provider,  specific staff member): DR. CASTILLO     What was the call regarding: PATIENT REQUESTING A CALLBACK REGARDING IF DR. CASTILLO WILL STILL REFILL HIS     gabapentin (NEURONTIN) 300 MG capsule     OR IF PAIN MANAGEMENT WILL TAKE THIS PRESCRIPTION OVER.

## 2024-12-30 ENCOUNTER — OFFICE VISIT (OUTPATIENT)
Dept: FAMILY MEDICINE CLINIC | Facility: CLINIC | Age: 54
End: 2024-12-30
Payer: COMMERCIAL

## 2024-12-30 VITALS
DIASTOLIC BLOOD PRESSURE: 90 MMHG | HEIGHT: 68 IN | BODY MASS INDEX: 36.25 KG/M2 | SYSTOLIC BLOOD PRESSURE: 130 MMHG | OXYGEN SATURATION: 99 % | HEART RATE: 60 BPM | TEMPERATURE: 97.7 F | WEIGHT: 239.2 LBS

## 2024-12-30 DIAGNOSIS — G89.29 CHRONIC LEFT-SIDED LOW BACK PAIN WITH LEFT-SIDED SCIATICA: ICD-10-CM

## 2024-12-30 DIAGNOSIS — I50.22 CHRONIC SYSTOLIC (CONGESTIVE) HEART FAILURE: ICD-10-CM

## 2024-12-30 DIAGNOSIS — Z12.2 ENCOUNTER FOR SCREENING FOR LUNG CANCER: ICD-10-CM

## 2024-12-30 DIAGNOSIS — M54.42 CHRONIC LEFT-SIDED LOW BACK PAIN WITH LEFT-SIDED SCIATICA: ICD-10-CM

## 2024-12-30 DIAGNOSIS — M54.9 UPPER BACK PAIN: ICD-10-CM

## 2024-12-30 DIAGNOSIS — Z23 ENCOUNTER FOR ADMINISTRATION OF VACCINE: ICD-10-CM

## 2024-12-30 DIAGNOSIS — Z72.0 TOBACCO ABUSE: Primary | ICD-10-CM

## 2024-12-30 PROCEDURE — 91320 SARSCV2 VAC 30MCG TRS-SUC IM: CPT | Performed by: FAMILY MEDICINE

## 2024-12-30 PROCEDURE — 1125F AMNT PAIN NOTED PAIN PRSNT: CPT | Performed by: FAMILY MEDICINE

## 2024-12-30 PROCEDURE — 90480 ADMN SARSCOV2 VAC 1/ONLY CMP: CPT | Performed by: FAMILY MEDICINE

## 2024-12-30 PROCEDURE — 99214 OFFICE O/P EST MOD 30 MIN: CPT | Performed by: FAMILY MEDICINE

## 2024-12-30 PROCEDURE — 1160F RVW MEDS BY RX/DR IN RCRD: CPT | Performed by: FAMILY MEDICINE

## 2024-12-30 PROCEDURE — 1159F MED LIST DOCD IN RCRD: CPT | Performed by: FAMILY MEDICINE

## 2024-12-30 RX ORDER — CYCLOBENZAPRINE HCL 10 MG
10 TABLET ORAL NIGHTLY PRN
Qty: 90 TABLET | Refills: 1 | Status: SHIPPED | OUTPATIENT
Start: 2024-12-30

## 2024-12-31 DIAGNOSIS — I50.22 CHRONIC SYSTOLIC (CONGESTIVE) HEART FAILURE: ICD-10-CM

## 2024-12-31 DIAGNOSIS — K21.9 GASTROESOPHAGEAL REFLUX DISEASE, UNSPECIFIED WHETHER ESOPHAGITIS PRESENT: ICD-10-CM

## 2024-12-31 DIAGNOSIS — M54.9 UPPER BACK PAIN: ICD-10-CM

## 2024-12-31 RX ORDER — FAMOTIDINE 20 MG/1
20 TABLET, FILM COATED ORAL 2 TIMES DAILY
Qty: 180 TABLET | Refills: 0 | Status: SHIPPED | OUTPATIENT
Start: 2024-12-31

## 2024-12-31 NOTE — TELEPHONE ENCOUNTER
Caller: Mayito Bassett    Relationship: Self    Best call back number:  [phone]      Requested Prescriptions     Pending Prescriptions Disp Refills    famotidine (PEPCID) 20 MG tablet 180 tablet 0     Sig: Take 1 tablet by mouth 2 (Two) Times a Day.        Pharmacy where request should be sent: [id number] [place] - [address] [facility] [phone] [facility] [phone]      Last office visit with prescribing clinician: 12/30/2024   Last telemedicine visit with prescribing clinician: Visit date not found   Next office visit with prescribing clinician: 6/30/2025     Additional details provided by patient:     1 WEEK LEFT - HOLIDAY     Does the patient have less than a 3 day supply:  [] Yes  [] No    Would you like a call back once the refill request has been completed: [] Yes [] No    If the office needs to give you a call back, can they leave a voicemail: [] Yes [] No    Hunter Agudelo Rep   12/31/24 07:47 CST

## 2024-12-31 NOTE — PROGRESS NOTES
"Chief Complaint  Back Pain (Patient presents to clinic for a 3 month follow up on chronic low back pain located on the left side. Patient reports he is working more and longer hours.Patient states pain management is taking over the Gabapentin Rx)    Subjective        Mayito Bassett presents to [facility]  Back Pain      Recently seen by pain management, they plan to take over his gabapentin regimen  Working on stopping smoking  BP well controlled at home    Objective   Vital Signs:  /90 (BP Location: Right arm, Patient Position: Sitting, Cuff Size: Adult)   Pulse 60   Temp 97.7 °F (36.5 °C) (Temporal)   Ht 172.7 cm (68\")   Wt 109 kg (239 lb 3.2 oz)   SpO2 99%   BMI 36.37 kg/m²   Estimated body mass index is 36.37 kg/m² as calculated from the following:    Height as of this encounter: 172.7 cm (68\").    Weight as of this encounter: 109 kg (239 lb 3.2 oz).            Physical Exam  Vitals and nursing note reviewed.   Constitutional:       General: He is not in acute distress.     Appearance: He is not diaphoretic.   HENT:      Head: Normocephalic and atraumatic.      Nose: Nose normal.   Eyes:      General: No scleral icterus.        Right eye: No discharge.         Left eye: No discharge.      Conjunctiva/sclera: Conjunctivae normal.   Neck:      Trachea: No tracheal deviation.   Pulmonary:      Effort: Pulmonary effort is normal.   Skin:     General: Skin is warm and dry.      Coloration: Skin is not pale.   Neurological:      Mental Status: He is alert and oriented to person, place, and time.   Psychiatric:         Behavior: Behavior normal.         Thought Content: Thought content normal.         Judgment: Judgment normal.        Result Review :                Assessment and Plan   Diagnoses and all orders for this visit:    1. Tobacco abuse (Primary)    2. Encounter for administration of vaccine  -     COVID-19 (Pfizer) 12yrs+ (COMIRNATY)    3. Encounter for " screening for lung cancer  -      CT Chest Low Dose Cancer Screening WO; Future    4. Chronic left-sided low back pain with left-sided sciatica    5. Upper back pain  -     cyclobenzaprine (FLEXERIL) 10 MG tablet; Take 1 tablet by mouth At Night As Needed for Muscle Spasms.  Dispense: 90 tablet; Refill: 1    6. Chronic systolic (congestive) heart failure    Needs refill of flexeril  Encourage tobacco cessation  Continue home BP monitoring         Follow Up   Return in about 6 months (around 6/30/2025) for Recheck.  Patient was given instructions and counseling regarding his condition or for health maintenance advice. Please see specific information pulled into the AVS if appropriate.

## 2025-01-03 DIAGNOSIS — G89.29 CHRONIC LEFT-SIDED LOW BACK PAIN WITH LEFT-SIDED SCIATICA: ICD-10-CM

## 2025-01-03 DIAGNOSIS — M54.42 CHRONIC LEFT-SIDED LOW BACK PAIN WITH LEFT-SIDED SCIATICA: ICD-10-CM

## 2025-01-03 RX ORDER — GABAPENTIN 300 MG/1
300 CAPSULE ORAL 3 TIMES DAILY
Qty: 90 CAPSULE | Refills: 2 | Status: SHIPPED | OUTPATIENT
Start: 2025-01-03

## 2025-01-03 NOTE — TELEPHONE ENCOUNTER
LAST FILL DATE: 09/30/24  QTY 90 WITH 2 REFILLS      LAST APPT: 12/30/24  NEXT APPT: 06/30/25      LAST UDS: NONE ON FILE

## 2025-01-16 ENCOUNTER — HOSPITAL ENCOUNTER (OUTPATIENT)
Dept: CT IMAGING | Facility: HOSPITAL | Age: 55
Discharge: HOME | End: 2025-01-16
Admitting: FAMILY MEDICINE
Payer: COMMERCIAL

## 2025-01-16 DIAGNOSIS — Z12.2 ENCOUNTER FOR SCREENING FOR LUNG CANCER: ICD-10-CM

## 2025-01-16 PROCEDURE — 71271 CT THORAX LUNG CANCER SCR C-: CPT

## 2025-02-23 DIAGNOSIS — M54.9 UPPER BACK PAIN: ICD-10-CM

## 2025-02-24 RX ORDER — CYCLOBENZAPRINE HCL 10 MG
10 TABLET ORAL NIGHTLY PRN
Qty: 30 TABLET | OUTPATIENT
Start: 2025-02-24

## 2025-03-03 DIAGNOSIS — I50.22 CHRONIC SYSTOLIC (CONGESTIVE) HEART FAILURE: ICD-10-CM

## 2025-03-03 RX ORDER — DAPAGLIFLOZIN 10 MG/1
1 TABLET, FILM COATED ORAL DAILY
Qty: 90 TABLET | Refills: 3 | Status: SHIPPED | OUTPATIENT
Start: 2025-03-03

## 2025-03-07 ENCOUNTER — APPOINTMENT (OUTPATIENT)
Dept: GENERAL RADIOLOGY | Facility: HOSPITAL | Age: 55
End: 2025-03-07
Payer: COMMERCIAL

## 2025-03-07 ENCOUNTER — HOSPITAL ENCOUNTER (EMERGENCY)
Facility: HOSPITAL | Age: 55
Discharge: HOME | End: 2025-03-07
Attending: STUDENT IN AN ORGANIZED HEALTH CARE EDUCATION/TRAINING PROGRAM
Payer: COMMERCIAL

## 2025-03-07 VITALS
DIASTOLIC BLOOD PRESSURE: 56 MMHG | TEMPERATURE: 98.1 F | SYSTOLIC BLOOD PRESSURE: 102 MMHG | HEIGHT: 68 IN | HEART RATE: 56 BPM | BODY MASS INDEX: 36.6 KG/M2 | RESPIRATION RATE: 18 BRPM | OXYGEN SATURATION: 97 % | WEIGHT: 241.5 LBS

## 2025-03-07 DIAGNOSIS — M25.522 LEFT ELBOW PAIN: Primary | ICD-10-CM

## 2025-03-07 PROCEDURE — 99283 EMERGENCY DEPT VISIT LOW MDM: CPT

## 2025-03-07 PROCEDURE — 25010000002 KETOROLAC TROMETHAMINE PER 15 MG: Performed by: STUDENT IN AN ORGANIZED HEALTH CARE EDUCATION/TRAINING PROGRAM

## 2025-03-07 PROCEDURE — 96372 THER/PROPH/DIAG INJ SC/IM: CPT

## 2025-03-07 PROCEDURE — 73080 X-RAY EXAM OF ELBOW: CPT

## 2025-03-07 RX ORDER — KETOROLAC TROMETHAMINE 30 MG/ML
30 INJECTION, SOLUTION INTRAMUSCULAR; INTRAVENOUS ONCE
Status: DISCONTINUED | OUTPATIENT
Start: 2025-03-07 | End: 2025-03-07

## 2025-03-07 RX ORDER — CYCLOBENZAPRINE HCL 5 MG
5 TABLET ORAL 3 TIMES DAILY PRN
Qty: 15 TABLET | Refills: 0 | Status: SHIPPED | OUTPATIENT
Start: 2025-03-07

## 2025-03-07 RX ORDER — NAPROXEN 500 MG/1
500 TABLET ORAL 2 TIMES DAILY PRN
Qty: 10 TABLET | Refills: 0 | Status: SHIPPED | OUTPATIENT
Start: 2025-03-07

## 2025-03-07 RX ORDER — KETOROLAC TROMETHAMINE 30 MG/ML
30 INJECTION, SOLUTION INTRAMUSCULAR; INTRAVENOUS ONCE
Status: COMPLETED | OUTPATIENT
Start: 2025-03-07 | End: 2025-03-07

## 2025-03-07 RX ADMIN — KETOROLAC TROMETHAMINE 30 MG: 30 INJECTION, SOLUTION INTRAMUSCULAR; INTRAVENOUS at 22:16

## 2025-03-08 NOTE — ED PROVIDER NOTES
Subjective   History of Present Illness  54-year-old male presents with left elbow pain that has been present for approximately 1-1.5 weeks. The patient denies any specific trauma to the area. He reports pain with lifting, pressure, and movement. Symptoms have worsened over the last two days while doing yard work, specifically taking down and putting up fences. PMH significant for cardiac conditions and seizure disorder.        Review of Systems    Past Medical History:   Diagnosis Date    Anxiety     Arthritis     Asthma     Cancer     Chronic back pain     Congestive heart failure (CHF)     Depression     EKG abnormalities 07/27/2022    GERD (gastroesophageal reflux disease)     Obesity (BMI 30-39.9)     Seizures     Tendonitis 2023    right wrist and hand, palms and fingers       No Known Allergies    Past Surgical History:   Procedure Laterality Date    CARDIAC CATHETERIZATION Left 12/06/2022    Procedure: Left Heart Cath;  Surgeon: Wil Bustos MD;  Location: [facility] CATH INVASIVE LOCATION;  Service: Cardiology;  Laterality: Left;    CARDIAC CATHETERIZATION  12/6/2022    WRIST GANGLION EXCISION Left        Family History   Problem Relation Age of Onset    Heart disease Mother     Kidney disease Mother     Diabetes Mother     No Known Problems Sister     No Known Problems Brother     No Known Problems Maternal Grandmother     No Known Problems Maternal Grandfather     No Known Problems Paternal Grandmother     No Known Problems Paternal Grandfather     No Known Problems Daughter     No Known Problems Son     No Known Problems Cousin     Rheum arthritis Neg Hx     Osteoarthritis Neg Hx     Asthma Neg Hx     Heart failure Neg Hx     Hyperlipidemia Neg Hx     Hypertension Neg Hx     Migraines Neg Hx     Rashes / Skin problems Neg Hx     Seizures Neg Hx     Stroke Neg Hx     Thyroid disease Neg Hx        Social History     Socioeconomic History    Marital status: Single   Tobacco Use    Smoking status: Every Day      Current packs/day: 1.00     Average packs/day: 1 pack/day for 37.0 years (37.0 ttl pk-yrs)     Types: Cigarettes    Smokeless tobacco: Never   Vaping Use    Vaping status: Never Used   Substance and Sexual Activity    Alcohol use: Yes     Comment: 1 A MONTH    Drug use: Never     Types: Marijuana     Comment: YEARS AGO    Sexual activity: Not Currently     Partners: Female     Birth control/protection: None           Objective   Physical Exam    Constitutional:  General: Patient is not in acute distress.  Appearance: Patient is not diaphoretic.    HENT:  Head: Normocephalic and atraumatic.  Right Ear: External ear normal.  Left Ear: External ear normal.  Nose: Nose normal.    Eyes:  General: No scleral icterus.  Conjunctiva/sclera: Conjunctivae normal.    Cardiovascular:  Rate and Rhythm: Normal rate and regular rhythm.  Heart sounds: No friction rub.    Pulmonary:  Effort: Pulmonary effort is normal. No respiratory distress.  Breath sounds: No stridor. No wheezing.    Musculoskeletal:  General: No deformity.  **Left Elbow: Tenderness noted over the medial aspect. Neurovascularly intact.**    Skin:  General: Skin is warm and dry. No rashes present. Normal color. Normal turgor.    Neurological:  General: No focal deficit present.  Mental Status: Alert and oriented      Procedures           ED Course  ED Course as of 03/08/25 0335   Fri Mar 07, 2025   2249 X-ray imaging negative for concerns of bony fractures.  Concerns for tendinitis.  Patient feels much better after given Toradol.  Will discharge with pain medication.  Ortho follow-up was recommended for further management of left elbow atraumatic pain.  Patient is in agreement with plan [SG]      ED Course User Index  [SG] Mushtaq Dawson MD                                                       Medical Decision Making  Patient presents with left elbow pain concerning for medial epicondylitis.    Imaging studies: X-ray of the left elbow was ordered to evaluate  for any structural abnormalities.    Treatment: Patient to receive Toradol injection for pain management.    Problems Addressed:  Left elbow pain: complicated acute illness or injury    Amount and/or Complexity of Data Reviewed  Radiology: ordered.    Risk  Prescription drug management.        Final diagnoses:   Left elbow pain       ED Disposition  ED Disposition       ED Disposition   Discharge    Condition   Stable    Comment   --               Donald Ospina DO  [address]  [address]  [facility] [address]  [phone]          Alexander Perez MD  [address]   [facility] [address]  [phone]    In 1 week           Medication List        New Prescriptions      naproxen 500 MG EC tablet  Commonly known as: EC NAPROSYN  Take 1 tablet by mouth 2 (Two) Times a Day As Needed for Mild Pain.            Changed      * cyclobenzaprine 10 MG tablet  Commonly known as: FLEXERIL  Take 1 tablet by mouth At Night As Needed for Muscle Spasms.  What changed: Another medication with the same name was added. Make sure you understand how and when to take each.     * cyclobenzaprine 5 MG tablet  Commonly known as: FLEXERIL  Take 1 tablet by mouth 3 (Three) Times a Day As Needed for Muscle Spasms.  What changed: You were already taking a medication with the same name, and this prescription was added. Make sure you understand how and when to take each.           * This list has 2 medication(s) that are the same as other medications prescribed for you. Read the directions carefully, and ask your doctor or other care provider to review them with you.                   Where to Get Your Medications        These medications were sent to [facility] #[id number] - [place], [place] - [address] - [phone]  - [phone] FX  [address], [place] [address]      Phone: [phone]   cyclobenzaprine 5 MG tablet  naproxen 500 MG EC tablet            Samir  Mushtaq AMBROSE MD  03/08/25 0336

## 2025-03-08 NOTE — DISCHARGE INSTRUCTIONS
As discussed please follow-up with orthopedics.  I have given you referral for it.  Please take medications as prescribed.  Return to the emergency room with worsening pain, swelling, skin color changes.

## 2025-03-10 ENCOUNTER — TELEPHONE (OUTPATIENT)
Age: 55
End: 2025-03-10
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
## 2025-03-12 ENCOUNTER — OFFICE VISIT (OUTPATIENT)
Dept: CARDIOLOGY | Facility: CLINIC | Age: 55
End: 2025-03-12
Payer: COMMERCIAL

## 2025-03-12 ENCOUNTER — OFFICE VISIT (OUTPATIENT)
Age: 55
End: 2025-03-12

## 2025-03-12 VITALS
DIASTOLIC BLOOD PRESSURE: 75 MMHG | OXYGEN SATURATION: 99 % | BODY MASS INDEX: 36.53 KG/M2 | RESPIRATION RATE: 18 BRPM | SYSTOLIC BLOOD PRESSURE: 118 MMHG | WEIGHT: 241 LBS | HEART RATE: 56 BPM | HEIGHT: 68 IN

## 2025-03-12 VITALS — WEIGHT: 241 LBS | HEIGHT: 68 IN | BODY MASS INDEX: 36.53 KG/M2

## 2025-03-12 DIAGNOSIS — I50.22 CHRONIC SYSTOLIC (CONGESTIVE) HEART FAILURE: ICD-10-CM

## 2025-03-12 DIAGNOSIS — I50.22 CHRONIC SYSTOLIC CONGESTIVE HEART FAILURE: Primary | ICD-10-CM

## 2025-03-12 DIAGNOSIS — I10 PRIMARY HYPERTENSION: ICD-10-CM

## 2025-03-12 DIAGNOSIS — M77.02 MEDIAL EPICONDYLITIS OF ELBOW, LEFT: Primary | ICD-10-CM

## 2025-03-12 DIAGNOSIS — R00.1 BRADYCARDIA, SINUS: ICD-10-CM

## 2025-03-12 DIAGNOSIS — I25.10 NONOBSTRUCTIVE ATHEROSCLEROSIS OF CORONARY ARTERY: ICD-10-CM

## 2025-03-12 RX ORDER — SACUBITRIL AND VALSARTAN 49; 51 MG/1; MG/1
1 TABLET, FILM COATED ORAL 2 TIMES DAILY
Qty: 180 TABLET | Refills: 3 | Status: SHIPPED | OUTPATIENT
Start: 2025-03-12

## 2025-03-12 RX ORDER — SPIRONOLACTONE 25 MG/1
25 TABLET ORAL DAILY
COMMUNITY
Start: 2025-02-23

## 2025-03-12 RX ORDER — NAPROXEN 500 MG/1
TABLET, DELAYED RELEASE ORAL
COMMUNITY

## 2025-03-12 RX ORDER — LIDOCAINE HYDROCHLORIDE 10 MG/ML
2 INJECTION, SOLUTION INFILTRATION; PERINEURAL ONCE
Status: COMPLETED | OUTPATIENT
Start: 2025-03-12 | End: 2025-03-12

## 2025-03-12 RX ORDER — SPIRONOLACTONE 25 MG/1
25 TABLET ORAL DAILY
Qty: 90 TABLET | Refills: 3 | Status: SHIPPED | OUTPATIENT
Start: 2025-03-12

## 2025-03-12 RX ORDER — CYCLOBENZAPRINE HCL 10 MG
10 TABLET ORAL NIGHTLY PRN
COMMUNITY
Start: 2024-12-30

## 2025-03-12 RX ORDER — ZONISAMIDE 100 MG/1
100 CAPSULE ORAL 3 TIMES DAILY
COMMUNITY
Start: 2024-11-14

## 2025-03-12 RX ORDER — BETAMETHASONE SODIUM PHOSPHATE AND BETAMETHASONE ACETATE 3; 3 MG/ML; MG/ML
6 INJECTION, SUSPENSION INTRA-ARTICULAR; INTRALESIONAL; INTRAMUSCULAR; SOFT TISSUE ONCE
Status: COMPLETED | OUTPATIENT
Start: 2025-03-12 | End: 2025-03-12

## 2025-03-12 RX ORDER — SACUBITRIL AND VALSARTAN 24; 26 MG/1; MG/1
1 TABLET, FILM COATED ORAL 2 TIMES DAILY
COMMUNITY
Start: 2025-02-20

## 2025-03-12 RX ORDER — DAPAGLIFLOZIN 10 MG/1
10 TABLET, FILM COATED ORAL DAILY
COMMUNITY
Start: 2025-03-03

## 2025-03-12 RX ORDER — FAMOTIDINE 20 MG/1
20 TABLET, FILM COATED ORAL 2 TIMES DAILY
COMMUNITY
Start: 2024-12-31

## 2025-03-12 RX ORDER — GABAPENTIN 400 MG/1
400 CAPSULE ORAL 3 TIMES DAILY
COMMUNITY
Start: 2025-02-11

## 2025-03-12 RX ADMIN — LIDOCAINE HYDROCHLORIDE 2 ML: 10 INJECTION, SOLUTION INFILTRATION; PERINEURAL at 08:21

## 2025-03-12 RX ADMIN — BETAMETHASONE SODIUM PHOSPHATE AND BETAMETHASONE ACETATE 6 MG: 3; 3 INJECTION, SUSPENSION INTRA-ARTICULAR; INTRALESIONAL; INTRAMUSCULAR; SOFT TISSUE at 08:20

## 2025-03-12 NOTE — PROGRESS NOTES
Subjective:     Encounter Date:03/12/2025      Patient ID: Mayito Bassett is a 54 y.o. male     Chief Complaint:  History of Present Illness  Patient presents today for routine cardiology follow up. Patient is followed for chronic systolic congestive heart failure. Patient also has sinus bradycardia and nonobstructive coronary artery disease. Remotely he has followed with [facility]. He established care with Dr. Allred in 2022. He had a heart cath in 2022 which showed mild nonobstructive coronary artery disease. Patient has seizures, GERD, hyperlipidemia, hypertension, obesity and sinus bradycardia. Last echo was in February 2024 in LVEF 41-45%, trace MR and hypokinetic walls. Has had bradycardia with junction rhythm. He follows with Dr. Ospina as his PCP. Overall patient is stable. He denies volume overload. Denies chest pain. He notes rare dizziness with standing up too quickly.     The following portions of the patient's history were reviewed and updated as appropriate: allergies, current medications, past medical history, past social history, past and problem list.    No Known Allergies    Current Outpatient Medications:     aspirin 81 MG EC tablet, Take 1 tablet by mouth Daily., Disp: 90 tablet, Rfl: 3    bacitracin 500 UNIT/GM ointment, Apply  topically to the appropriate area as directed 2 (Two) Times a Day. Under the left eye, Disp: 14 g, Rfl: 0    cyclobenzaprine (FLEXERIL) 10 MG tablet, Take 1 tablet by mouth At Night As Needed for Muscle Spasms., Disp: 90 tablet, Rfl: 1    cyclobenzaprine (FLEXERIL) 5 MG tablet, Take 1 tablet by mouth 3 (Three) Times a Day As Needed for Muscle Spasms., Disp: 15 tablet, Rfl: 0    dapagliflozin Propanediol (Farxiga) 10 MG tablet, Take 10 mg by mouth Daily., Disp: 90 tablet, Rfl: 3    diclofenac (VOLTAREN) 50 MG EC tablet, Take 1 tablet by mouth 2 (Two) Times a Day As Needed (pain)., Disp: 12 tablet, Rfl: 0    erythromycin (ROMYCIN) 5 MG/GM ophthalmic  ointment, Administer  into the left eye Every 4 (Four) Hours While Awake., Disp: 1 g, Rfl: 0    famotidine (PEPCID) 20 MG tablet, Take 1 tablet by mouth 2 (Two) Times a Day., Disp: 180 tablet, Rfl: 0    gabapentin (NEURONTIN) 300 MG capsule, TAKE 1 CAPSULE BY MOUTH THREE TIMES DAILY, Disp: 90 capsule, Rfl: 2    levETIRAcetam (KEPPRA) 1000 MG tablet, Take 1 tablet by mouth 2 (Two) Times a Day., Disp: 60 tablet, Rfl: 5    lidocaine (LIDODERM) 5 %, Place 1 patch on the skin as directed by provider Daily. Remove & Discard patch within 12 hours or as directed by MD, Disp: 30 each, Rfl: 0    Multiple Vitamin (MULTI VITAMIN MENS PO), Take  by mouth., Disp: , Rfl:     naproxen (EC NAPROSYN) 500 MG EC tablet, Take 1 tablet by mouth 2 (Two) Times a Day As Needed for Mild Pain., Disp: 10 tablet, Rfl: 0    sacubitril-valsartan (ENTRESTO) 49-51 MG tablet, Take 1 tablet by mouth 2 (Two) Times a Day., Disp: 180 tablet, Rfl: 3    spironolactone (ALDACTONE) 25 MG tablet, Take 1 tablet by mouth Daily., Disp: 90 tablet, Rfl: 3    traZODone (DESYREL) 100 MG tablet, Take one tablet at bedtime, Disp: 90 tablet, Rfl: 1    zonisamide (ZONEGRAN) 100 MG capsule, Take 1 capsule by mouth 3 times a day., Disp: 90 capsule, Rfl: 3    Social History     Socioeconomic History    Marital status: Single   Tobacco Use    Smoking status: Every Day     Current packs/day: 1.00     Average packs/day: 1 pack/day for 37.0 years (37.0 ttl pk-yrs)     Types: Cigarettes    Smokeless tobacco: Never   Vaping Use    Vaping status: Never Used   Substance and Sexual Activity    Alcohol use: Yes     Comment: 1 A MONTH    Drug use: Never     Types: Marijuana     Comment: YEARS AGO    Sexual activity: Not Currently     Partners: Female     Birth control/protection: None       Review of Systems   Constitutional: Positive for malaise/fatigue. Negative for chills, decreased appetite, fever, weight gain and weight loss.   HENT:  Negative for nosebleeds.    Eyes:  Negative  for visual disturbance.   Cardiovascular:  Negative for chest pain, dyspnea on exertion, leg swelling, near-syncope, orthopnea, palpitations, paroxysmal nocturnal dyspnea and syncope.   Respiratory:  Positive for shortness of breath. Negative for cough, hemoptysis and snoring.    Endocrine: Negative for cold intolerance and heat intolerance.   Hematologic/Lymphatic: Negative for bleeding problem. Does not bruise/bleed easily.   Skin:  Negative for rash.   Musculoskeletal:  Negative for back pain and falls.   Gastrointestinal:  Negative for abdominal pain, constipation, diarrhea, heartburn, melena, nausea and vomiting.   Genitourinary:  Negative for hematuria.   Neurological:  Positive for dizziness. Negative for headaches and light-headedness.   Psychiatric/Behavioral:  Negative for altered mental status.    Allergic/Immunologic: Negative for persistent infections.              Objective:     Constitutional:       Appearance: Well-developed. Obese and frail. Chronically ill-appearing.   Eyes:      Pupils: Pupils are equal, round, and reactive to light.   HENT:      Head: Normocephalic and atraumatic.   Neck:      Vascular: No carotid bruit or JVD.   Pulmonary:      Effort: Pulmonary effort is normal.      Breath sounds: Normal breath sounds.   Cardiovascular:      Normal rate. Regular rhythm.   Pulses:     Intact distal pulses.   Abdominal:      General: Bowel sounds are normal.      Palpations: Abdomen is soft.   Musculoskeletal: Normal range of motion.      Cervical back: Normal range of motion and neck supple. Skin:     General: Skin is warm and dry.   Neurological:      Mental Status: Alert and oriented to person, place, and time.      Deep Tendon Reflexes: Reflexes are normal and symmetric.   Psychiatric:         Behavior: Behavior normal.         Thought Content: Thought content normal.         Judgment: Judgment normal.             ECG 12 Lead    Date/Time: 3/12/2025 1:19 PM  Performed by: Angel Norris,  "APRN    Authorized by: Angel Norris APRN  Comparison: compared with previous ECG from 1/24/2024  Similar to previous ECG  Rhythm: sinus bradycardia        /75 (BP Location: Right arm, Patient Position: Sitting, Cuff Size: Large Adult)   Pulse 56   Resp 18   Ht 172.7 cm (68\")   Wt 109 kg (241 lb)   SpO2 99%   BMI 36.64 kg/m²   Lab Review:   I have reviewed       Lab Results   Component Value Date    CHOL 134 02/07/2024    TRIG 136 02/07/2024    HDL 40 02/07/2024    LDL 70 02/07/2024      Results for orders placed during the hospital encounter of 02/27/24    Adult Transthoracic Echo Complete W/ Cont if Necessary Per Protocol    Interpretation Summary    Left ventricular systolic function is mildly decreased. Left ventricular ejection fraction appears to be 41 - 45%.    The right ventricular cavity is moderately dilated. Normal right ventricular systolic function noted.    The left atrial cavity is mildly dilated.    Trace mitral valve regurgitation is present.    The following segments are hypokinetic: apical anterior, apical septal, apical inferior, apical lateral and apex.     Assessment:          Diagnosis Plan   1. Chronic systolic congestive heart failure        2. Nonobstructive atherosclerosis of coronary artery        3. Bradycardia, sinus        4. Primary hypertension        5. Chronic systolic (congestive) heart failure  spironolactone (ALDACTONE) 25 MG tablet             Plan:       Chronic Systolic Congestive Heart Failure - LVEF 41-45%. Farxiga, Entresto and Aldactone. Intolerant to beta blocker due to bradycardia. Remains euvolemic. Stable. Stay hydrated. Occasional dizziness.   Nonobstructive Coronary Artery Disease - nonobstructive disease on cath in 2022. Continue Aspirin. Denies symptoms.   Sinus Bradycardia - intolerant to beta blockers. Stable.   Hypertension - blood pressure stable. Continue current regimen.       Tobacco Abuse    Mayito Bassett  reports that he has been " smoking cigarettes. He has a 37 pack-year smoking history. He has never used smokeless tobacco. I have educated him on the risk of diseases from using tobacco products such as cancer, COPD, and heart disease.     I advised him to quit and he is not willing to quit.    I spent 3  minutes counseling the patient.    Follow up in 6 months.

## 2025-03-21 ENCOUNTER — OFFICE VISIT (OUTPATIENT)
Dept: NEUROLOGY | Facility: CLINIC | Age: 55
End: 2025-03-21
Payer: COMMERCIAL

## 2025-03-21 VITALS
OXYGEN SATURATION: 96 % | SYSTOLIC BLOOD PRESSURE: 118 MMHG | HEIGHT: 68 IN | HEART RATE: 57 BPM | WEIGHT: 240 LBS | BODY MASS INDEX: 36.37 KG/M2 | DIASTOLIC BLOOD PRESSURE: 66 MMHG

## 2025-03-21 DIAGNOSIS — G40.909 NONINTRACTABLE EPILEPSY WITHOUT STATUS EPILEPTICUS, UNSPECIFIED EPILEPSY TYPE: Primary | ICD-10-CM

## 2025-03-21 RX ORDER — LEVETIRACETAM 1000 MG/1
1000 TABLET ORAL 2 TIMES DAILY
Qty: 180 TABLET | Refills: 3 | Status: SHIPPED | OUTPATIENT
Start: 2025-03-21 | End: 2026-03-21

## 2025-03-21 RX ORDER — GABAPENTIN 400 MG/1
1 CAPSULE ORAL 3 TIMES DAILY
COMMUNITY
Start: 2025-03-14

## 2025-03-21 RX ORDER — ZONISAMIDE 100 MG/1
100 CAPSULE ORAL 3 TIMES DAILY
Qty: 270 CAPSULE | Refills: 3 | Status: SHIPPED | OUTPATIENT
Start: 2025-03-21 | End: 2026-03-21

## 2025-03-21 NOTE — PROGRESS NOTES
"  Neurology Consult Note    [facility]  [address], [address]  [place] [address]  Phone: [phone]  Fax: [phone]    Referring Provider:   No ref. provider found  Primary Care Provider:  Donald Ospina DO    Reason for Consult:  Epilepsy  Subjective      Mayito Bassett presents to [facility]    History of Present Illness  54-year-old male seen for follow-up of epilepsy.  Last seen in clinic on 3/19/2024.  Patient reporting he been seizure-free for approximately 2 years.  He is managed on levetiracetam at 1000 mg twice daily as well as on zonisamide 100 mg capsule,  3 capsules nightly.  He was set for 1 year follow-up.    Today patient presents by himself.  Reports to me overall he has done well over this last year.  He does tell me approximately 2 months ago he was at [employer], standing and I will.  He notes he may have had a spell where he \"blacked out\".  During this time patient was standing next to his cart.  He believes someone asked him to move however he did not respond.  After a few seconds, he did come to the.  He notes he never fell to the ground, has been avulsions, any urinary incontinence or bowel incontinence.  Afterwards he felt completely fine.  He said no further episodes like these.  He denied missing doses of medications.  Denied any illnesses around that time.  He does tell me he continues to work at [employer].  Patient Active Problem List   Diagnosis    Bradycardia, sinus    Tobacco abuse    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    Low HDL (under 40)    Obesity (BMI 30-39.9)    Dorsalgia    Seizure disorder    Chronic systolic (congestive) heart failure    Annual physical exam    Vitamin D deficiency    Onychomycosis    Gastroesophageal reflux disease    Muscle spasms of lower extremity    Insomnia    Encounter for screening for lung cancer    Nonintractable " epilepsy without status epilepticus    Chronic bilateral low back pain without sciatica    Cardiomyopathy, dilated    Morbid obesity with body mass index (BMI) of 40.0 or higher    Upper back pain    Healthcare maintenance    Chronic right-sided low back pain    Preoperative clearance    Immunization due    Constipation    Hand pain, right    Right wrist pain    Muscle spasm        Past Medical History:   Diagnosis Date    Anxiety     Arthritis     Asthma     Cancer     Chronic back pain     Congestive heart failure (CHF)     Depression     EKG abnormalities 07/27/2022    GERD (gastroesophageal reflux disease)     Obesity (BMI 30-39.9)     Seizures     Tendonitis 2023    right wrist and hand, palms and fingers        Social History     Socioeconomic History    Marital status: Single   Tobacco Use    Smoking status: Every Day     Current packs/day: 1.00     Average packs/day: 1 pack/day for 37.0 years (37.0 ttl pk-yrs)     Types: Cigarettes    Smokeless tobacco: Never   Vaping Use    Vaping status: Never Used   Substance and Sexual Activity    Alcohol use: Yes     Comment: 1 A MONTH    Drug use: Never     Types: Marijuana     Comment: YEARS AGO    Sexual activity: Not Currently     Partners: Female     Birth control/protection: None        No Known Allergies       Current Outpatient Medications:     aspirin 81 MG EC tablet, Take 1 tablet by mouth Daily., Disp: 90 tablet, Rfl: 3    bacitracin 500 UNIT/GM ointment, Apply  topically to the appropriate area as directed 2 (Two) Times a Day. Under the left eye, Disp: 14 g, Rfl: 0    cyclobenzaprine (FLEXERIL) 10 MG tablet, Take 1 tablet by mouth At Night As Needed for Muscle Spasms., Disp: 90 tablet, Rfl: 1    cyclobenzaprine (FLEXERIL) 5 MG tablet, Take 1 tablet by mouth 3 (Three) Times a Day As Needed for Muscle Spasms., Disp: 15 tablet, Rfl: 0    dapagliflozin Propanediol (Farxiga) 10 MG tablet, Take 10 mg by mouth Daily., Disp: 90 tablet, Rfl: 3    diclofenac  "(VOLTAREN) 50 MG EC tablet, Take 1 tablet by mouth 2 (Two) Times a Day As Needed (pain)., Disp: 12 tablet, Rfl: 0    erythromycin (ROMYCIN) 5 MG/GM ophthalmic ointment, Administer  into the left eye Every 4 (Four) Hours While Awake., Disp: 1 g, Rfl: 0    famotidine (PEPCID) 20 MG tablet, Take 1 tablet by mouth 2 (Two) Times a Day., Disp: 180 tablet, Rfl: 0    gabapentin (NEURONTIN) 400 MG capsule, Take 1 capsule by mouth 3 times a day., Disp: , Rfl:     levETIRAcetam (KEPPRA) 1000 MG tablet, Take 1 tablet by mouth 2 (Two) Times a Day., Disp: 180 tablet, Rfl: 3    lidocaine (LIDODERM) 5 %, Place 1 patch on the skin as directed by provider Daily. Remove & Discard patch within 12 hours or as directed by MD, Disp: 30 each, Rfl: 0    Multiple Vitamin (MULTI VITAMIN MENS PO), Take  by mouth., Disp: , Rfl:     naproxen (EC NAPROSYN) 500 MG EC tablet, Take 1 tablet by mouth 2 (Two) Times a Day As Needed for Mild Pain., Disp: 10 tablet, Rfl: 0    sacubitril-valsartan (ENTRESTO) 49-51 MG tablet, Take 1 tablet by mouth 2 (Two) Times a Day., Disp: 180 tablet, Rfl: 3    spironolactone (ALDACTONE) 25 MG tablet, Take 1 tablet by mouth Daily., Disp: 90 tablet, Rfl: 3    traZODone (DESYREL) 100 MG tablet, Take one tablet at bedtime, Disp: 90 tablet, Rfl: 1    zonisamide (ZONEGRAN) 100 MG capsule, Take 1 capsule by mouth 3 times a day., Disp: 270 capsule, Rfl: 3       Objective   Vital Signs:   /66   Pulse 57   Ht 172.7 cm (68\")   Wt 109 kg (240 lb)   SpO2 96%   BMI 36.49 kg/m²       Physical Exam  Vitals and nursing note reviewed.   Constitutional:       Appearance: Normal appearance.   HENT:      Head: Normocephalic.   Eyes:      General: Lids are normal.      Extraocular Movements: Extraocular movements intact.      Pupils: Pupils are equal, round, and reactive to light.   Pulmonary:      Effort: Pulmonary effort is normal. No respiratory distress.   Skin:     General: Skin is warm and dry.   Neurological:      Mental " Status: He is alert.      Motor: Motor strength is normal.     Deep Tendon Reflexes: Reflexes are normal and symmetric.   Psychiatric:         Speech: Speech normal.        Neurological Exam  Mental Status  Alert. Oriented to person, place, time and situation. Speech is normal. Language is fluent with no aphasia.    Cranial Nerves  CN II: Visual fields full to confrontation.  CN III, IV, VI: Extraocular movements intact bilaterally. Normal lids and orbits bilaterally. Pupils equal round and reactive to light bilaterally.  CN V: Facial sensation is normal.  CN VII: Full and symmetric facial movement.  CN IX, X: Palate elevates symmetrically. Normal gag reflex.  CN XI: Shoulder shrug strength is normal.  CN XII: Tongue midline without atrophy or fasciculations.    Motor   Strength is 5/5 throughout all four extremities.    Sensory  Light touch is normal in upper and lower extremities.     Reflexes  Deep tendon reflexes are 2+ and symmetric in all four extremities.    Gait  Casual gait is normal including stance, stride, and arm swing.      Result Review :   The following data was reviewed by: NITHIN Krueger on 03/21/2025:  CMP          4/11/2024    08:00   CMP   Glucose 154    BUN 19    Creatinine 0.88    EGFR 102.2    Sodium 141    Potassium 3.7    Chloride 106    Calcium 9.4    BUN/Creatinine Ratio 21.6    Anion Gap 9.0    Progress Notes by Kaycee Bruce APRN (03/19/2024 14:30)                         Impression:  Mayito Bassett is a 54 y.o. male who presents for follow-up of epilepsy.  Overall patient is stable.  Recommend continuing current regimen of levetiracetam at 1000 mg twice daily as well as zonisamide 300 mg nightly.  Regards to episode to happen a couple of months ago, unclear etiology however I do not felt this represents a epileptic event.  If further episodes do occur, we will reevaluate with an EEG.  However since has been 1 episode as well as history of bradycardia, I do favor  other etiologies.  Patient voiced agreements and plan of care.  No red flags today.    Diagnoses and all orders for this visit:    1. Nonintractable epilepsy without status epilepticus, unspecified epilepsy type (Primary)  Comments:  Cont zonisamind 300 q hs and keppra 1000 bid. Check labs  Orders:  -     levETIRAcetam (KEPPRA) 1000 MG tablet; Take 1 tablet by mouth 2 (Two) Times a Day.  Dispense: 180 tablet; Refill: 3  -     zonisamide (ZONEGRAN) 100 MG capsule; Take 1 capsule by mouth 3 times a day.  Dispense: 270 capsule; Refill: 3        Plan:  Continue levetiracetam 1000 mg twice daily  Continue zonisamide 300 mg nightly  Routine seizure precautions  Contact our office with any recurrent episodes  Follow-up with primary care as scheduled  Follow-up in our clinic 1 year or sooner if needed    The patient and I have discussed the plan of care and he is in full agreement at this time.     Follow Up   Return in about 1 year (around 3/21/2026) for Seizure.            Kaycee Bruce, NITHIN  03/21/25  14:03 CDT

## 2025-04-01 ENCOUNTER — OFFICE VISIT (OUTPATIENT)
Age: 55
End: 2025-04-01
Payer: MEDICAID

## 2025-04-01 VITALS — WEIGHT: 241 LBS | HEIGHT: 68 IN | BODY MASS INDEX: 36.53 KG/M2

## 2025-04-01 DIAGNOSIS — M77.02 MEDIAL EPICONDYLITIS OF ELBOW, LEFT: Primary | ICD-10-CM

## 2025-04-01 PROCEDURE — 99213 OFFICE O/P EST LOW 20 MIN: CPT | Performed by: PHYSICIAN ASSISTANT

## 2025-04-01 PROCEDURE — 4004F PT TOBACCO SCREEN RCVD TLK: CPT | Performed by: PHYSICIAN ASSISTANT

## 2025-04-01 PROCEDURE — 3017F COLORECTAL CA SCREEN DOC REV: CPT | Performed by: PHYSICIAN ASSISTANT

## 2025-04-01 PROCEDURE — G8427 DOCREV CUR MEDS BY ELIG CLIN: HCPCS | Performed by: PHYSICIAN ASSISTANT

## 2025-04-01 PROCEDURE — G8417 CALC BMI ABV UP PARAM F/U: HCPCS | Performed by: PHYSICIAN ASSISTANT

## 2025-04-25 ENCOUNTER — HOSPITAL ENCOUNTER (EMERGENCY)
Facility: HOSPITAL | Age: 55
Discharge: HOME | End: 2025-04-25
Payer: COMMERCIAL

## 2025-04-25 ENCOUNTER — APPOINTMENT (OUTPATIENT)
Dept: CT IMAGING | Facility: HOSPITAL | Age: 55
End: 2025-04-25
Payer: COMMERCIAL

## 2025-04-25 VITALS
BODY MASS INDEX: 36.75 KG/M2 | RESPIRATION RATE: 16 BRPM | TEMPERATURE: 97.7 F | HEIGHT: 68 IN | SYSTOLIC BLOOD PRESSURE: 109 MMHG | OXYGEN SATURATION: 95 % | HEART RATE: 63 BPM | DIASTOLIC BLOOD PRESSURE: 65 MMHG | WEIGHT: 242.51 LBS

## 2025-04-25 DIAGNOSIS — M54.50 MIDLINE LOW BACK PAIN WITHOUT SCIATICA, UNSPECIFIED CHRONICITY: Primary | ICD-10-CM

## 2025-04-25 LAB
ALBUMIN SERPL-MCNC: 4.4 G/DL (ref 3.5–5.2)
ALBUMIN/GLOB SERPL: 1.6 G/DL
ALP SERPL-CCNC: 87 U/L (ref 39–117)
ALT SERPL W P-5'-P-CCNC: 36 U/L (ref 1–41)
ANION GAP SERPL CALCULATED.3IONS-SCNC: 12 MMOL/L (ref 5–15)
AST SERPL-CCNC: 26 U/L (ref 1–40)
BASOPHILS # BLD AUTO: 0.06 10*3/MM3 (ref 0–0.2)
BASOPHILS NFR BLD AUTO: 0.8 % (ref 0–1.5)
BILIRUB SERPL-MCNC: 0.4 MG/DL (ref 0–1.2)
BUN SERPL-MCNC: 14 MG/DL (ref 6–20)
BUN/CREAT SERPL: 17.3 (ref 7–25)
CALCIUM SPEC-SCNC: 8.8 MG/DL (ref 8.6–10.5)
CHLORIDE SERPL-SCNC: 102 MMOL/L (ref 98–107)
CO2 SERPL-SCNC: 25 MMOL/L (ref 22–29)
CREAT SERPL-MCNC: 0.81 MG/DL (ref 0.76–1.27)
DEPRECATED RDW RBC AUTO: 42.6 FL (ref 37–54)
EGFRCR SERPLBLD CKD-EPI 2021: 104.1 ML/MIN/1.73
EOSINOPHIL # BLD AUTO: 0.14 10*3/MM3 (ref 0–0.4)
EOSINOPHIL NFR BLD AUTO: 1.8 % (ref 0.3–6.2)
ERYTHROCYTE [DISTWIDTH] IN BLOOD BY AUTOMATED COUNT: 12.3 % (ref 12.3–15.4)
ERYTHROCYTE [SEDIMENTATION RATE] IN BLOOD: 2 MM/HR (ref 0–20)
GLOBULIN UR ELPH-MCNC: 2.7 GM/DL
GLUCOSE SERPL-MCNC: 111 MG/DL (ref 65–99)
HCT VFR BLD AUTO: 49.4 % (ref 37.5–51)
HGB BLD-MCNC: 17.1 G/DL (ref 13–17.7)
IMM GRANULOCYTES # BLD AUTO: 0.05 10*3/MM3 (ref 0–0.05)
IMM GRANULOCYTES NFR BLD AUTO: 0.6 % (ref 0–0.5)
INR PPP: 1.01 (ref 0.91–1.09)
LYMPHOCYTES # BLD AUTO: 2.45 10*3/MM3 (ref 0.7–3.1)
LYMPHOCYTES NFR BLD AUTO: 30.6 % (ref 19.6–45.3)
MCH RBC QN AUTO: 32.5 PG (ref 26.6–33)
MCHC RBC AUTO-ENTMCNC: 34.6 G/DL (ref 31.5–35.7)
MCV RBC AUTO: 93.9 FL (ref 79–97)
MONOCYTES # BLD AUTO: 0.54 10*3/MM3 (ref 0.1–0.9)
MONOCYTES NFR BLD AUTO: 6.8 % (ref 5–12)
NEUTROPHILS NFR BLD AUTO: 4.76 10*3/MM3 (ref 1.7–7)
NEUTROPHILS NFR BLD AUTO: 59.4 % (ref 42.7–76)
NRBC BLD AUTO-RTO: 0 /100 WBC (ref 0–0.2)
PLATELET # BLD AUTO: 159 10*3/MM3 (ref 140–450)
PMV BLD AUTO: 10.2 FL (ref 6–12)
POTASSIUM SERPL-SCNC: 3.2 MMOL/L (ref 3.5–5.2)
PROT SERPL-MCNC: 7.1 G/DL (ref 6–8.5)
PROTHROMBIN TIME: 13.8 SECONDS (ref 11.8–14.8)
RBC # BLD AUTO: 5.26 10*6/MM3 (ref 4.14–5.8)
SODIUM SERPL-SCNC: 139 MMOL/L (ref 136–145)
WBC NRBC COR # BLD AUTO: 8 10*3/MM3 (ref 3.4–10.8)

## 2025-04-25 PROCEDURE — 96374 THER/PROPH/DIAG INJ IV PUSH: CPT

## 2025-04-25 PROCEDURE — 25010000002 ONDANSETRON PER 1 MG: Performed by: NURSE PRACTITIONER

## 2025-04-25 PROCEDURE — 72132 CT LUMBAR SPINE W/DYE: CPT

## 2025-04-25 PROCEDURE — 99285 EMERGENCY DEPT VISIT HI MDM: CPT

## 2025-04-25 PROCEDURE — 85652 RBC SED RATE AUTOMATED: CPT | Performed by: NURSE PRACTITIONER

## 2025-04-25 PROCEDURE — 80053 COMPREHEN METABOLIC PANEL: CPT | Performed by: NURSE PRACTITIONER

## 2025-04-25 PROCEDURE — 25510000001 IOPAMIDOL PER 1 ML: Performed by: NURSE PRACTITIONER

## 2025-04-25 PROCEDURE — 36415 COLL VENOUS BLD VENIPUNCTURE: CPT

## 2025-04-25 PROCEDURE — 85025 COMPLETE CBC W/AUTO DIFF WBC: CPT | Performed by: NURSE PRACTITIONER

## 2025-04-25 PROCEDURE — 85610 PROTHROMBIN TIME: CPT | Performed by: NURSE PRACTITIONER

## 2025-04-25 PROCEDURE — 96375 TX/PRO/DX INJ NEW DRUG ADDON: CPT

## 2025-04-25 PROCEDURE — 25010000002 HYDROMORPHONE 1 MG/ML SOLUTION: Performed by: NURSE PRACTITIONER

## 2025-04-25 PROCEDURE — 25010000002 DEXAMETHASONE PER 1 MG: Performed by: NURSE PRACTITIONER

## 2025-04-25 RX ORDER — ONDANSETRON 2 MG/ML
4 INJECTION INTRAMUSCULAR; INTRAVENOUS ONCE
Status: COMPLETED | OUTPATIENT
Start: 2025-04-25 | End: 2025-04-25

## 2025-04-25 RX ORDER — DEXAMETHASONE SODIUM PHOSPHATE 10 MG/ML
10 INJECTION, SOLUTION INTRA-ARTICULAR; INTRALESIONAL; INTRAMUSCULAR; INTRAVENOUS; SOFT TISSUE ONCE
Status: COMPLETED | OUTPATIENT
Start: 2025-04-25 | End: 2025-04-25

## 2025-04-25 RX ORDER — METHYLPREDNISOLONE 4 MG/1
TABLET ORAL
Qty: 21 EACH | Refills: 0 | Status: SHIPPED | OUTPATIENT
Start: 2025-04-25

## 2025-04-25 RX ORDER — IOPAMIDOL 755 MG/ML
100 INJECTION, SOLUTION INTRAVASCULAR
Status: COMPLETED | OUTPATIENT
Start: 2025-04-25 | End: 2025-04-25

## 2025-04-25 RX ADMIN — ONDANSETRON 4 MG: 2 INJECTION INTRAMUSCULAR; INTRAVENOUS at 21:50

## 2025-04-25 RX ADMIN — DEXAMETHASONE SODIUM PHOSPHATE 10 MG: 10 INJECTION INTRAMUSCULAR; INTRAVENOUS at 21:49

## 2025-04-25 RX ADMIN — HYDROMORPHONE HYDROCHLORIDE 1 MG: 1 INJECTION, SOLUTION INTRAMUSCULAR; INTRAVENOUS; SUBCUTANEOUS at 21:50

## 2025-04-25 RX ADMIN — IOPAMIDOL 100 ML: 755 INJECTION, SOLUTION INTRAVENOUS at 21:29

## 2025-04-25 NOTE — Clinical Note
[facility] EMERGENCY DEPARTMENT  [address]  [facility] [address]  Phone: [phone]    Mayito Bassett was seen and treated in our emergency department on 4/25/2025.  He may return to work on 04/28/2025.         Thank you for choosing [facility].    Debbie Longo, NITHIN

## 2025-04-26 NOTE — ED PROVIDER NOTES
"Subjective   History of Present Illness  Patient is a 54-year-old male presents the emergency department with increasing back pain.  He sees pain management and states 4 days ago he was seen by pain management and had injections done in his back and had a procedure done where they \"burned the nerves in his back to help his back pain.\"  He states that he went back to work today and that the pain is much worse.  He states he is having a hard time standing up straight because the pain is so bad.  He states he tried to call pain management but never got a call back today.  He denies any radicular symptoms.  No numbness or tingling.  No signs of saddle anesthesias.  He states the pain is mostly in his lower back.  He denies any fever.  He states that the pain is just much worse in his back.  He states he is not due to go back there until June.  He does not have a neurosurgeon that he sees.  He has not had back surgery in the past.    History provided by:  Patient   used: No        Review of Systems   Constitutional: Negative.    HENT: Negative.     Eyes: Negative.    Respiratory: Negative.     Cardiovascular: Negative.    Gastrointestinal: Negative.    Endocrine: Negative.    Genitourinary: Negative.    Musculoskeletal:         Patient is a 54-year-old male presents the emergency department with increasing back pain.  He sees pain management and states 4 days ago he was seen by pain management and had injections done in his back and had a procedure done where they \"burned the nerves in his back to help his back pain.\"  He states that he went back to work today and that the pain is much worse.  He states he is having a hard time standing up straight because the pain is so bad.  He states he tried to call pain management but never got a call back today.  He denies any radicular symptoms.  No numbness or tingling.  No signs of saddle anesthesias.  He states the pain is mostly in his lower back.  He " denies any fever.  He states that the pain is just much worse in his back.  He states he is not due to go back there until June.  He does not have a neurosurgeon that he sees.  He has not had back surgery in the past.     Skin: Negative.    Allergic/Immunologic: Negative.    Neurological: Negative.    Hematological: Negative.    Psychiatric/Behavioral: Negative.     All other systems reviewed and are negative.      Past Medical History:   Diagnosis Date    Anxiety     Arthritis     Asthma     Cancer     Chronic back pain     Congestive heart failure (CHF)     Depression     EKG abnormalities 07/27/2022    GERD (gastroesophageal reflux disease)     Obesity (BMI 30-39.9)     Seizures     Tendonitis 2023    right wrist and hand, palms and fingers       No Known Allergies    Past Surgical History:   Procedure Laterality Date    CARDIAC CATHETERIZATION Left 12/06/2022    Procedure: Left Heart Cath;  Surgeon: Wil Bustos MD;  Location: [facility] CATH INVASIVE LOCATION;  Service: Cardiology;  Laterality: Left;    CARDIAC CATHETERIZATION  12/6/2022    WRIST GANGLION EXCISION Left        Family History   Problem Relation Age of Onset    Heart disease Mother     Kidney disease Mother     Diabetes Mother     No Known Problems Sister     No Known Problems Brother     No Known Problems Maternal Grandmother     No Known Problems Maternal Grandfather     No Known Problems Paternal Grandmother     No Known Problems Paternal Grandfather     No Known Problems Daughter     No Known Problems Son     No Known Problems Cousin     Rheum arthritis Neg Hx     Osteoarthritis Neg Hx     Asthma Neg Hx     Heart failure Neg Hx     Hyperlipidemia Neg Hx     Hypertension Neg Hx     Migraines Neg Hx     Rashes / Skin problems Neg Hx     Seizures Neg Hx     Stroke Neg Hx     Thyroid disease Neg Hx        Social History     Socioeconomic History    Marital status: Single   Tobacco Use    Smoking status: Every Day     Current packs/day: 1.00      Average packs/day: 1 pack/day for 37.0 years (37.0 ttl pk-yrs)     Types: Cigarettes    Smokeless tobacco: Never   Vaping Use    Vaping status: Never Used   Substance and Sexual Activity    Alcohol use: Yes     Comment: 1 A MONTH    Drug use: Never     Types: Marijuana     Comment: YEARS AGO    Sexual activity: Not Currently     Partners: Female     Birth control/protection: None       Prior to Admission medications    Medication Sig Start Date End Date Taking? Authorizing Provider   aspirin 81 MG EC tablet Take 1 tablet by mouth Daily. 1/22/24   Donald Ospina DO   bacitracin 500 UNIT/GM ointment Apply  topically to the appropriate area as directed 2 (Two) Times a Day. Under the left eye 10/21/24   Cristine Capellan APRN   cyclobenzaprine (FLEXERIL) 10 MG tablet Take 1 tablet by mouth At Night As Needed for Muscle Spasms. 12/30/24   Donald Ospina DO   cyclobenzaprine (FLEXERIL) 5 MG tablet Take 1 tablet by mouth 3 (Three) Times a Day As Needed for Muscle Spasms. 3/7/25   Mushtaq Dawson MD   dapagliflozin Propanediol (Farxiga) 10 MG tablet Take 10 mg by mouth Daily. 3/3/25   Donald Ospina DO   diclofenac (VOLTAREN) 50 MG EC tablet Take 1 tablet by mouth 2 (Two) Times a Day As Needed (pain). 6/19/24   Aden Ashton PA-C   erythromycin (ROMYCIN) 5 MG/GM ophthalmic ointment Administer  into the left eye Every 4 (Four) Hours While Awake. 10/21/24   Cristine Capellan APRN   famotidine (PEPCID) 20 MG tablet Take 1 tablet by mouth 2 (Two) Times a Day. 12/31/24   Donald Ospina DO   gabapentin (NEURONTIN) 400 MG capsule Take 1 capsule by mouth 3 times a day. 3/14/25   Maryann Oliveira MD   levETIRAcetam (KEPPRA) 1000 MG tablet Take 1 tablet by mouth 2 (Two) Times a Day. 3/21/25 3/21/26  Kaycee Bruce APRN   lidocaine (LIDODERM) 5 % Place 1 patch on the skin as directed by provider Daily. Remove & Discard patch within 12 hours or as directed by MD 6/19/24   Aden Ashton PA-C  "  Multiple Vitamin (MULTI VITAMIN MENS PO) Take  by mouth.    Provider, MD Maryann   naproxen (EC NAPROSYN) 500 MG EC tablet Take 1 tablet by mouth 2 (Two) Times a Day As Needed for Mild Pain. 3/7/25   Mushtaq Dawson MD   sacubitril-valsartan (ENTRESTO) 49-51 MG tablet Take 1 tablet by mouth 2 (Two) Times a Day. 3/12/25   Angel Norris APRN   spironolactone (ALDACTONE) 25 MG tablet Take 1 tablet by mouth Daily. 3/12/25   Angel Norris APRN   traZODone (DESYREL) 100 MG tablet Take one tablet at bedtime 1/22/24   Donald Ospina DO   zonisamide (ZONEGRAN) 100 MG capsule Take 1 capsule by mouth 3 times a day. 3/21/25 3/21/26  Kaycee Bruce APRN       /66   Pulse 60   Temp 98.5 °F (36.9 °C) (Oral)   Resp 14   Ht 172.7 cm (67.99\")   Wt 110 kg (242 lb 8.1 oz)   SpO2 96%   BMI 36.88 kg/m²     Objective   Physical Exam  Vitals and nursing note reviewed.   Constitutional:       Appearance: He is well-developed.   HENT:      Head: Normocephalic and atraumatic.   Eyes:      Conjunctiva/sclera: Conjunctivae normal.      Pupils: Pupils are equal, round, and reactive to light.   Cardiovascular:      Rate and Rhythm: Normal rate and regular rhythm.      Heart sounds: Normal heart sounds.   Pulmonary:      Effort: Pulmonary effort is normal.      Breath sounds: Normal breath sounds.   Abdominal:      General: Bowel sounds are normal.      Palpations: Abdomen is soft.   Musculoskeletal:      Cervical back: Normal range of motion and neck supple.      Comments: Patient has pain on palpation of the lower lumbar spine.  There is no step-off or laxity noted.  Foot push pull is strong and equal.  DTRs are intact.  There is no signs of saddle anesthesias.   Skin:     General: Skin is warm and dry.   Neurological:      Mental Status: He is alert and oriented to person, place, and time.      Deep Tendon Reflexes: Reflexes are normal and symmetric.   Psychiatric:         Behavior: Behavior normal.         " Thought Content: Thought content normal.         Judgment: Judgment normal.         Procedures         Lab Results (last 24 hours)       Procedure Component Value Units Date/Time    CBC & Differential [[id number]]  (Abnormal) Collected: 04/25/25 2148    Specimen: Blood Updated: 04/25/25 2201    Narrative:      The following orders were created for panel order CBC & Differential.  Procedure                               Abnormality         Status                     ---------                               -----------         ------                     CBC Auto Differential[[id number]]        Abnormal            Final result                 Please view results for these tests on the individual orders.    Comprehensive Metabolic Panel [[id number]]  (Abnormal) Collected: 04/25/25 2148    Specimen: Blood Updated: 04/25/25 2218     Glucose 111 mg/dL      BUN 14 mg/dL      Creatinine 0.81 mg/dL      Sodium 139 mmol/L      Potassium 3.2 mmol/L      Comment: Slight hemolysis detected by analyzer. Result may be falsely elevated.        Chloride 102 mmol/L      CO2 25.0 mmol/L      Calcium 8.8 mg/dL      Total Protein 7.1 g/dL      Albumin 4.4 g/dL      ALT (SGPT) 36 U/L      AST (SGOT) 26 U/L      Alkaline Phosphatase 87 U/L      Total Bilirubin 0.4 mg/dL      Globulin 2.7 gm/dL      A/G Ratio 1.6 g/dL      BUN/Creatinine Ratio 17.3     Anion Gap 12.0 mmol/L      eGFR 104.1 mL/min/1.73     Narrative:      GFR Categories in Chronic Kidney Disease (CKD)      GFR Category          GFR (mL/min/1.73)    Interpretation  G1                     90 or greater         Normal or high (1)  G2                      60-89                Mild decrease (1)  G3a                   45-59                Mild to moderate decrease  G3b                   30-44                Moderate to severe decrease  G4                    15-29                Severe decrease  G5                    14 or less           Kidney failure          (1)In the absence of  evidence of kidney disease, neither GFR category G1 or G2 fulfill the criteria for CKD.    eGFR calculation 2021 CKD-EPI creatinine equation, which does not include race as a factor    Protime-INR [[id number]]  (Normal) Collected: 04/25/25 2148    Specimen: Blood Updated: 04/25/25 2211     Protime 13.8 Seconds      INR 1.01    Sedimentation Rate [[id number]]  (Normal) Collected: 04/25/25 2148    Specimen: Blood Updated: 04/25/25 2207     Sed Rate 2 mm/hr     CBC Auto Differential [[id number]]  (Abnormal) Collected: 04/25/25 2148    Specimen: Blood Updated: 04/25/25 2201     WBC 8.00 10*3/mm3      RBC 5.26 10*6/mm3      Hemoglobin 17.1 g/dL      Hematocrit 49.4 %      MCV 93.9 fL      MCH 32.5 pg      MCHC 34.6 g/dL      RDW 12.3 %      RDW-SD 42.6 fl      MPV 10.2 fL      Platelets 159 10*3/mm3      Neutrophil % 59.4 %      Lymphocyte % 30.6 %      Monocyte % 6.8 %      Eosinophil % 1.8 %      Basophil % 0.8 %      Immature Grans % 0.6 %      Neutrophils, Absolute 4.76 10*3/mm3      Lymphocytes, Absolute 2.45 10*3/mm3      Monocytes, Absolute 0.54 10*3/mm3      Eosinophils, Absolute 0.14 10*3/mm3      Basophils, Absolute 0.06 10*3/mm3      Immature Grans, Absolute 0.05 10*3/mm3      nRBC 0.0 /100 WBC             CT Lumbar Spine With Contrast   Final Result   Impression:   1. There is bulging of the disc as well as facet and ligamentous   hypertrophy with associated central and foraminal stenosis at the lower   3 lumbar disc levels.   2. No acute fracture or listhesis. Mild levo curvature of the lumbar   spine.               This report was signed and finalized on 4/25/2025 9:59 PM by Dr. Fadi Harper MD.              ED Course  ED Course as of 04/25/25 2224 Fri Apr 25, 2025 2048 Reviewed pt and pt care plan with Dr. Dawson- also in agreement with care plan and in agreement with care plan  [CW]   2220 IMPRESSION:  Impression:  1. There is bulging of the disc as well as facet and ligamentous  hypertrophy with  "associated central and foraminal stenosis at the lower  3 lumbar disc levels.  2. No acute fracture or listhesis. Mild levo curvature of the lumbar  spine.  CMP unremarkable CBC white count 8 hemoglobin 17.1 hematocrit 49.4 sed rate 2 patient has received Dilaudid, Zofran, Decadron.  He is states his pain is much better at this time.  Advised the patient of his results.  Will send the patient home with a steroid Dosepak and Zanaflex.  Advised the patient to follow-up with pain management on Monday.  Advised to apply ice to the area.  Advised to return the emergency department if his symptoms worsen.  Patient will be discharged shortly in stable condition. [CW]      ED Course User Index  [CW] Debbie Longo APRN        Medical Decision Making  Patient is a 54-year-old male presents the emergency department with increasing back pain.  He sees pain management and states 4 days ago he was seen by pain management and had injections done in his back and had a procedure done where they \"burned the nerves in his back to help his back pain.\"  He states that he went back to work today and that the pain is much worse.  He states he is having a hard time standing up straight because the pain is so bad.  He states he tried to call pain management but never got a call back today.  He denies any radicular symptoms.  No numbness or tingling.  No signs of saddle anesthesias.  He states the pain is mostly in his lower back.  He denies any fever.  He states that the pain is just much worse in his back.  He states he is not due to go back there until June.  He does not have a neurosurgeon that he sees.  He has not had back surgery in the past.  Course of treatment in the ED: Nontoxic-appearing.  No acute distress.  He does appear to be in pain.  Lungs clear to auscultation.  CV normal sinus rhythm.  Patient has pain on palpation of the lower lumbar spine.  There is no step-off or laxity noted.  DTRs are intact.  Foot push pull " is strong and equal.  No signs of saddle anesthesias. Reviewed pt and pt care plan with Dr. Dawson-will order laboratory studies and CT of the lumbar spine with IV contrast and pain medication and Decadron.  Differential diagnosis to include but not limited to: Epidural abscess, epidural hematoma, disc herniation, degenerative disc disease and other  Labs Reviewed  COMPREHENSIVE METABOLIC PANEL - Abnormal; Notable for the following components:     Glucose                       111 (*)                Potassium                     3.2 (*)             All other components within normal limits         Narrative: GFR Categories in Chronic Kidney Disease (CKD)                                      GFR Category          GFR (mL/min/1.73)    Interpretation                  G1                     90 or greater         Normal or high (1)                  G2                      60-89                Mild decrease (1)                  G3a                   45-59                Mild to moderate decrease                  G3b                   30-44                Moderate to severe decrease                  G4                    15-29                Severe decrease                  G5                    14 or less           Kidney failure                                          (1)In the absence of evidence of kidney disease, neither GFR category G1 or G2 fulfill the criteria for CKD.                                    eGFR calculation 2021 CKD-EPI creatinine equation, which does not include race as a factor  CBC WITH AUTO DIFFERENTIAL - Abnormal; Notable for the following components:     Immature Grans %              0.6 (*)             All other components within normal limits  PROTIME-INR - Normal  SEDIMENTATION RATE - Normal  CBC AND DIFFERENTIAL  CT Lumbar Spine With Contrast   Final Result    Impression:    1. There is bulging of the disc as well as facet and ligamentous    hypertrophy with associated central and foraminal  stenosis at the lower    3 lumbar disc levels.    2. No acute fracture or listhesis. Mild levo curvature of the lumbar    spine.                   This report was signed and finalized on 4/25/2025 9:59 PM by Dr. Fadi Harper MD.          IMPRESSION:  Impression:  1. There is bulging of the disc as well as facet and ligamentous  hypertrophy with associated central and foraminal stenosis at the lower  3 lumbar disc levels.  2. No acute fracture or listhesis. Mild levo curvature of the lumbar  spine.  CMP unremarkable CBC white count 8 hemoglobin 17.1 hematocrit 49.4 sed rate 2 patient has received Dilaudid, Zofran, Decadron.  He is states his pain is much better at this time.  Advised the patient of his results.  Will send the patient home with a steroid Dosepak and Zanaflex.  Advised the patient to follow-up with pain management on Monday.  Advised to apply ice to the area.  Advised to return the emergency department if his symptoms worsen.  Patient will be discharged shortly in stable condition.      Problems Addressed:  Midline low back pain without sciatica, unspecified chronicity: complicated acute illness or injury    Amount and/or Complexity of Data Reviewed  Labs: ordered. Decision-making details documented in ED Course.  Radiology: ordered. Decision-making details documented in ED Course.    Risk  Prescription drug management.         Final diagnoses:   Midline low back pain without sciatica, unspecified chronicity          Debbie Longo, APRN  04/25/25 [id number]

## 2025-04-26 NOTE — DISCHARGE INSTRUCTIONS
Return to ER if symptoms worsen   Follow up with primary care provider on Monday   Follow up with pain management on Monday   Ice to area

## 2025-05-18 DIAGNOSIS — I50.22 CHRONIC SYSTOLIC (CONGESTIVE) HEART FAILURE: ICD-10-CM

## 2025-05-19 RX ORDER — SPIRONOLACTONE 25 MG/1
25 TABLET ORAL DAILY
Qty: 30 TABLET | Refills: 3 | Status: SHIPPED | OUTPATIENT
Start: 2025-05-19

## 2025-06-09 NOTE — PROGRESS NOTES
complications.      Return if symptoms worsen or fail to improve.    Electronically signed by BENJI Kan on 6/10/2025 at 3:34 PM.

## 2025-06-10 ENCOUNTER — OFFICE VISIT (OUTPATIENT)
Age: 55
End: 2025-06-10
Payer: MEDICAID

## 2025-06-10 VITALS — HEIGHT: 68 IN | WEIGHT: 227.6 LBS | BODY MASS INDEX: 34.49 KG/M2

## 2025-06-10 DIAGNOSIS — M77.02 MEDIAL EPICONDYLITIS OF ELBOW, LEFT: Primary | ICD-10-CM

## 2025-06-10 PROCEDURE — 20551 NJX 1 TENDON ORIGIN/INSJ: CPT | Performed by: PHYSICIAN ASSISTANT

## 2025-06-10 RX ORDER — LIDOCAINE HYDROCHLORIDE 10 MG/ML
2 INJECTION, SOLUTION INFILTRATION; PERINEURAL ONCE
Status: COMPLETED | OUTPATIENT
Start: 2025-06-10 | End: 2025-06-10

## 2025-06-10 RX ORDER — TRIAMCINOLONE ACETONIDE 40 MG/ML
40 INJECTION, SUSPENSION INTRA-ARTICULAR; INTRAMUSCULAR ONCE
Status: COMPLETED | OUTPATIENT
Start: 2025-06-10 | End: 2025-06-10

## 2025-06-10 RX ADMIN — LIDOCAINE HYDROCHLORIDE 2 ML: 10 INJECTION, SOLUTION INFILTRATION; PERINEURAL at 15:58

## 2025-06-10 RX ADMIN — TRIAMCINOLONE ACETONIDE 40 MG: 40 INJECTION, SUSPENSION INTRA-ARTICULAR; INTRAMUSCULAR at 15:59

## 2025-06-18 DIAGNOSIS — K21.9 GASTROESOPHAGEAL REFLUX DISEASE, UNSPECIFIED WHETHER ESOPHAGITIS PRESENT: ICD-10-CM

## 2025-06-18 RX ORDER — FAMOTIDINE 20 MG/1
20 TABLET, FILM COATED ORAL 2 TIMES DAILY
Qty: 180 TABLET | Refills: 0 | Status: SHIPPED | OUTPATIENT
Start: 2025-06-18

## 2025-06-30 ENCOUNTER — OFFICE VISIT (OUTPATIENT)
Dept: FAMILY MEDICINE CLINIC | Facility: CLINIC | Age: 55
End: 2025-06-30
Payer: COMMERCIAL

## 2025-06-30 VITALS
OXYGEN SATURATION: 98 % | SYSTOLIC BLOOD PRESSURE: 120 MMHG | BODY MASS INDEX: 34.46 KG/M2 | HEART RATE: 55 BPM | TEMPERATURE: 97.5 F | DIASTOLIC BLOOD PRESSURE: 80 MMHG | WEIGHT: 227.4 LBS | HEIGHT: 68 IN

## 2025-06-30 DIAGNOSIS — Z72.0 TOBACCO ABUSE: ICD-10-CM

## 2025-06-30 DIAGNOSIS — M54.42 CHRONIC LEFT-SIDED LOW BACK PAIN WITH LEFT-SIDED SCIATICA: ICD-10-CM

## 2025-06-30 DIAGNOSIS — I50.22 CHRONIC SYSTOLIC (CONGESTIVE) HEART FAILURE: ICD-10-CM

## 2025-06-30 DIAGNOSIS — Z12.5 SCREENING FOR PROSTATE CANCER: ICD-10-CM

## 2025-06-30 DIAGNOSIS — K21.9 GASTROESOPHAGEAL REFLUX DISEASE, UNSPECIFIED WHETHER ESOPHAGITIS PRESENT: ICD-10-CM

## 2025-06-30 DIAGNOSIS — G89.29 CHRONIC LEFT-SIDED LOW BACK PAIN WITH LEFT-SIDED SCIATICA: ICD-10-CM

## 2025-06-30 DIAGNOSIS — Z00.00 ANNUAL PHYSICAL EXAM: Primary | ICD-10-CM

## 2025-06-30 RX ORDER — CYCLOBENZAPRINE HCL 10 MG
10 TABLET ORAL NIGHTLY PRN
COMMUNITY
Start: 2025-06-16

## 2025-06-30 NOTE — PROGRESS NOTES
CC:   Chief Complaint   Patient presents with    Annual Exam       History:  Mayito Bassett is a 54 y.o. male who presents today for follow-up for evaluation of the above:    History of Present Illness  Here for annual exam  Feels tired, poor sleep last night, otherwise ROS neg  BP stable on current regimen  Pain stable on gabapentin, working with pain management    Past Medical History:   Diagnosis Date    Anxiety     Arthritis     Asthma     Cancer     Chronic back pain     Congestive heart failure (CHF)     Depression     EKG abnormalities 07/27/2022    GERD (gastroesophageal reflux disease)     Obesity (BMI 30-39.9)     Seizures     Tendonitis 2023    right wrist and hand, palms and fingers     Past Surgical History:   Procedure Laterality Date    CARDIAC CATHETERIZATION Left 12/06/2022    Procedure: Left Heart Cath;  Surgeon: Wil Bustos MD;  Location: [facility] CATH INVASIVE LOCATION;  Service: Cardiology;  Laterality: Left;    CARDIAC CATHETERIZATION  12/6/2022    WRIST GANGLION EXCISION Left             Mr. Bassett  reports that he has been smoking cigarettes. He has a 37 pack-year smoking history. He has never used smokeless tobacco. He reports current alcohol use. He reports that he does not use drugs.      Current Outpatient Medications:     aspirin 81 MG EC tablet, Take 1 tablet by mouth Daily., Disp: 90 tablet, Rfl: 3    bacitracin 500 UNIT/GM ointment, Apply  topically to the appropriate area as directed 2 (Two) Times a Day. Under the left eye, Disp: 14 g, Rfl: 0    cyclobenzaprine (FLEXERIL) 10 MG tablet, Take 1 tablet by mouth At Night As Needed for Muscle Spasms., Disp: , Rfl:     dapagliflozin Propanediol (Farxiga) 10 MG tablet, Take 10 mg by mouth Daily., Disp: 90 tablet, Rfl: 3    diclofenac (VOLTAREN) 50 MG EC tablet, Take 1 tablet by mouth 2 (Two) Times a Day As Needed (pain)., Disp: 12 tablet, Rfl: 0    erythromycin (ROMYCIN) 5 MG/GM ophthalmic ointment, Administer  into the left  "eye Every 4 (Four) Hours While Awake., Disp: 1 g, Rfl: 0    famotidine (PEPCID) 20 MG tablet, TAKE 1 TABLET BY MOUTH TWICE DAILY, Disp: 180 tablet, Rfl: 0    gabapentin (NEURONTIN) 400 MG capsule, Take 1 capsule by mouth 3 times a day., Disp: , Rfl:     levETIRAcetam (KEPPRA) 1000 MG tablet, Take 1 tablet by mouth 2 (Two) Times a Day., Disp: 180 tablet, Rfl: 3    lidocaine (LIDODERM) 5 %, Place 1 patch on the skin as directed by provider Daily. Remove & Discard patch within 12 hours or as directed by MD, Disp: 30 each, Rfl: 0    methylPREDNISolone (MEDROL) 4 MG dose pack, Take as directed on package instructions., Disp: 21 each, Rfl: 0    Multiple Vitamin (MULTI VITAMIN MENS PO), Take  by mouth., Disp: , Rfl:     naproxen (EC NAPROSYN) 500 MG EC tablet, Take 1 tablet by mouth 2 (Two) Times a Day As Needed for Mild Pain., Disp: 10 tablet, Rfl: 0    sacubitril-valsartan (ENTRESTO) 49-51 MG tablet, Take 1 tablet by mouth 2 (Two) Times a Day., Disp: 180 tablet, Rfl: 3    spironolactone (ALDACTONE) 25 MG tablet, TAKE 1 TABLET BY MOUTH ONCE DAILY, Disp: 30 tablet, Rfl: 3    tiZANidine (ZANAFLEX) 4 MG tablet, Take 1 tablet by mouth Every 8 (Eight) Hours As Needed for Muscle Spasms., Disp: 15 tablet, Rfl: 0    traZODone (DESYREL) 100 MG tablet, Take one tablet at bedtime, Disp: 90 tablet, Rfl: 1    zonisamide (ZONEGRAN) 100 MG capsule, Take 1 capsule by mouth 3 times a day., Disp: 270 capsule, Rfl: 3      OBJECTIVE:  /80 (BP Location: Right arm, Patient Position: Sitting, Cuff Size: Adult)   Pulse 55   Temp 97.5 °F (36.4 °C) (Temporal)   Ht 172.7 cm (67.99\")   Wt 103 kg (227 lb 6.4 oz)   SpO2 98%   BMI 34.59 kg/m²    Physical Exam  Constitutional:       General: He is not in acute distress.     Appearance: Normal appearance. He is obese. He is not ill-appearing or diaphoretic.   HENT:      Head: Normocephalic and atraumatic.      Right Ear: Tympanic membrane and external ear normal.      Left Ear: Tympanic " membrane and external ear normal.      Nose: Nose normal. No congestion or rhinorrhea.      Mouth/Throat:      Mouth: Mucous membranes are moist.      Pharynx: Oropharynx is clear. No oropharyngeal exudate or posterior oropharyngeal erythema.   Eyes:      General: No scleral icterus.        Right eye: No discharge.         Left eye: No discharge.      Extraocular Movements: Extraocular movements intact.      Conjunctiva/sclera: Conjunctivae normal.      Pupils: Pupils are equal, round, and reactive to light.   Neck:      Thyroid: No thyromegaly.      Vascular: No JVD.   Cardiovascular:      Rate and Rhythm: Normal rate and regular rhythm.      Pulses: Normal pulses.      Heart sounds: Normal heart sounds. No murmur heard.     No friction rub. No gallop.   Pulmonary:      Effort: Pulmonary effort is normal.      Breath sounds: Normal breath sounds.   Abdominal:      General: Bowel sounds are normal. There is no distension.      Palpations: Abdomen is soft. There is no mass.      Tenderness: There is no abdominal tenderness. There is no guarding or rebound.   Musculoskeletal:         General: No deformity or signs of injury.      Cervical back: Neck supple.      Right lower leg: No edema.      Left lower leg: No edema.   Lymphadenopathy:      Cervical: No cervical adenopathy.   Skin:     General: Skin is warm and dry.      Capillary Refill: Capillary refill takes less than 2 seconds.      Coloration: Skin is not jaundiced or pale.   Neurological:      Mental Status: He is alert and oriented to person, place, and time. Mental status is at baseline.   Psychiatric:         Mood and Affect: Mood normal.         Behavior: Behavior normal.         Thought Content: Thought content normal.         Judgment: Judgment normal.         Assessment/Plan     Diagnosis Plan   1. Annual physical exam  Lipid panel    CBC No Differential    Comprehensive Metabolic Panel      2. Screening for prostate cancer  PSA SCREENING      3.  Chronic systolic (congestive) heart failure        4. Gastroesophageal reflux disease, unspecified whether esophagitis present        5. Chronic left-sided low back pain with left-sided sciatica        6. Tobacco abuse          Assessment & Plan  Preventative: PSA, encourage tobacco cessation   Clinically stable otherwise    An After Visit Summary was printed and given to the patient at discharge.  Return in about 6 months (around 12/30/2025). Sooner if problems arise.         Donald Ospina D.O.  Family Medicine  Osteopathic Neuromusculoskeletal Medicine

## 2025-07-16 ENCOUNTER — TELEPHONE (OUTPATIENT)
Dept: FAMILY MEDICINE CLINIC | Facility: CLINIC | Age: 55
End: 2025-07-16
Payer: COMMERCIAL

## 2025-07-16 DIAGNOSIS — M62.838 MUSCLE SPASMS OF LOWER EXTREMITY: Primary | ICD-10-CM

## 2025-07-16 DIAGNOSIS — K21.9 GASTROESOPHAGEAL REFLUX DISEASE, UNSPECIFIED WHETHER ESOPHAGITIS PRESENT: ICD-10-CM

## 2025-07-16 RX ORDER — FAMOTIDINE 20 MG/1
20 TABLET, FILM COATED ORAL 2 TIMES DAILY
Qty: 180 TABLET | Refills: 0 | Status: SHIPPED | OUTPATIENT
Start: 2025-07-16

## 2025-07-16 RX ORDER — CYCLOBENZAPRINE HCL 10 MG
10 TABLET ORAL NIGHTLY PRN
Qty: 30 TABLET | Refills: 3 | Status: SHIPPED | OUTPATIENT
Start: 2025-07-16

## 2025-07-16 NOTE — TELEPHONE ENCOUNTER
Caller: Mayito Bassett    Relationship: Self    Best call back number: [phone]     Requested Prescriptions:   Requested Prescriptions     Pending Prescriptions Disp Refills    cyclobenzaprine (FLEXERIL) 10 MG tablet       Sig: Take 1 tablet by mouth At Night As Needed for Muscle Spasms.    famotidine (PEPCID) 20 MG tablet 180 tablet 0     Sig: Take 1 tablet by mouth 2 (Two) Times a Day.        Pharmacy where request should be sent: [id number] - [place], [place] - [address] [facility] [phone] [facility] [phone] FX     Last office visit with prescribing clinician: 6/30/2025   Last telemedicine visit with prescribing clinician: Visit date not found   Next office visit with prescribing clinician: 12/29/2025         Does the patient have less than a 3 day supply:  [] Yes  [x] No    Hunter Mayberry Rep   07/16/25 09:13 CDT

## 2025-07-16 NOTE — TELEPHONE ENCOUNTER
Caller: Mayito Bassett    Relationship: Self    Best call back number: [phone] (Mobile) , PATIENT IS NOT ABLE TO CHECK MYCHART RIGHT NOW.    What orders are you requesting (i.e. lab or imaging): THE PATIENT STATES THAT HE WOULD LIKE TO KNOW IF HE NEEDS TO GET HIS CHOLESTEROL CHECKED.     Where will you receive your lab/imaging services: [facility]

## 2025-07-16 NOTE — TELEPHONE ENCOUNTER
Spoke with patient and informed him that it appears he has these labs checked twice a year and last time was in February so he is due in August. Patient verbalized understanding

## 2025-07-25 ENCOUNTER — LAB (OUTPATIENT)
Dept: LAB | Facility: HOSPITAL | Age: 55
End: 2025-07-25
Payer: COMMERCIAL

## 2025-07-25 ENCOUNTER — OFFICE VISIT (OUTPATIENT)
Dept: FAMILY MEDICINE CLINIC | Facility: CLINIC | Age: 55
End: 2025-07-25
Payer: COMMERCIAL

## 2025-07-25 ENCOUNTER — RESULTS FOLLOW-UP (OUTPATIENT)
Dept: FAMILY MEDICINE CLINIC | Facility: CLINIC | Age: 55
End: 2025-07-25
Payer: COMMERCIAL

## 2025-07-25 VITALS
DIASTOLIC BLOOD PRESSURE: 72 MMHG | WEIGHT: 221 LBS | OXYGEN SATURATION: 97 % | TEMPERATURE: 97 F | HEIGHT: 68 IN | HEART RATE: 64 BPM | BODY MASS INDEX: 33.49 KG/M2 | SYSTOLIC BLOOD PRESSURE: 116 MMHG

## 2025-07-25 DIAGNOSIS — Z12.5 SCREENING FOR PROSTATE CANCER: ICD-10-CM

## 2025-07-25 DIAGNOSIS — Z00.00 ANNUAL PHYSICAL EXAM: ICD-10-CM

## 2025-07-25 DIAGNOSIS — M79.642 BILATERAL HAND PAIN: ICD-10-CM

## 2025-07-25 DIAGNOSIS — M79.641 BILATERAL HAND PAIN: ICD-10-CM

## 2025-07-25 DIAGNOSIS — M79.641 BILATERAL HAND PAIN: Primary | ICD-10-CM

## 2025-07-25 DIAGNOSIS — M79.642 BILATERAL HAND PAIN: Primary | ICD-10-CM

## 2025-07-25 LAB
ALBUMIN SERPL-MCNC: 4.2 G/DL (ref 3.5–5.2)
ALBUMIN/GLOB SERPL: 1.6 G/DL
ALP SERPL-CCNC: 98 U/L (ref 39–117)
ALT SERPL W P-5'-P-CCNC: 30 U/L (ref 1–41)
ANION GAP SERPL CALCULATED.3IONS-SCNC: 11 MMOL/L (ref 5–15)
AST SERPL-CCNC: 25 U/L (ref 1–40)
BILIRUB SERPL-MCNC: 0.4 MG/DL (ref 0–1.2)
BUN SERPL-MCNC: 17.2 MG/DL (ref 6–20)
BUN/CREAT SERPL: 18.5 (ref 7–25)
CALCIUM SPEC-SCNC: 9 MG/DL (ref 8.6–10.5)
CHLORIDE SERPL-SCNC: 105 MMOL/L (ref 98–107)
CHOLEST SERPL-MCNC: 139 MG/DL (ref 0–200)
CO2 SERPL-SCNC: 24 MMOL/L (ref 22–29)
CREAT SERPL-MCNC: 0.93 MG/DL (ref 0.76–1.27)
DEPRECATED RDW RBC AUTO: 41.8 FL (ref 37–54)
EGFRCR SERPLBLD CKD-EPI 2021: 97 ML/MIN/1.73
ERYTHROCYTE [DISTWIDTH] IN BLOOD BY AUTOMATED COUNT: 12.1 % (ref 12.3–15.4)
GLOBULIN UR ELPH-MCNC: 2.7 GM/DL
GLUCOSE SERPL-MCNC: 126 MG/DL (ref 65–99)
HCT VFR BLD AUTO: 47.9 % (ref 37.5–51)
HDLC SERPL-MCNC: 37 MG/DL (ref 40–60)
HGB BLD-MCNC: 17 G/DL (ref 13–17.7)
LDLC SERPL CALC-MCNC: 77 MG/DL (ref 0–100)
LDLC/HDLC SERPL: 1.99 {RATIO}
MAGNESIUM SERPL-MCNC: 2.1 MG/DL (ref 1.6–2.6)
MCH RBC QN AUTO: 33.2 PG (ref 26.6–33)
MCHC RBC AUTO-ENTMCNC: 35.5 G/DL (ref 31.5–35.7)
MCV RBC AUTO: 93.6 FL (ref 79–97)
PLATELET # BLD AUTO: 143 10*3/MM3 (ref 140–450)
PMV BLD AUTO: 10 FL (ref 6–12)
POTASSIUM SERPL-SCNC: 3.6 MMOL/L (ref 3.5–5.2)
PROT SERPL-MCNC: 6.9 G/DL (ref 6–8.5)
PSA SERPL-MCNC: 1.07 NG/ML (ref 0–4)
RBC # BLD AUTO: 5.12 10*6/MM3 (ref 4.14–5.8)
SODIUM SERPL-SCNC: 140 MMOL/L (ref 136–145)
TRIGL SERPL-MCNC: 141 MG/DL (ref 0–150)
VLDLC SERPL-MCNC: 25 MG/DL (ref 5–40)
WBC NRBC COR # BLD AUTO: 7 10*3/MM3 (ref 3.4–10.8)

## 2025-07-25 PROCEDURE — 99214 OFFICE O/P EST MOD 30 MIN: CPT | Performed by: NURSE PRACTITIONER

## 2025-07-25 PROCEDURE — G0103 PSA SCREENING: HCPCS

## 2025-07-25 PROCEDURE — 80053 COMPREHEN METABOLIC PANEL: CPT

## 2025-07-25 PROCEDURE — 36415 COLL VENOUS BLD VENIPUNCTURE: CPT

## 2025-07-25 PROCEDURE — 80061 LIPID PANEL: CPT

## 2025-07-25 PROCEDURE — 83735 ASSAY OF MAGNESIUM: CPT

## 2025-07-25 PROCEDURE — 85027 COMPLETE CBC AUTOMATED: CPT

## 2025-07-25 PROCEDURE — 1126F AMNT PAIN NOTED NONE PRSNT: CPT | Performed by: NURSE PRACTITIONER

## 2025-07-25 RX ORDER — METHYLPREDNISOLONE 4 MG/1
TABLET ORAL
Qty: 21 TABLET | Refills: 0 | Status: SHIPPED | OUTPATIENT
Start: 2025-07-25

## 2025-07-25 NOTE — LETTER
July 25, 2025     Patient: Mayito Bassett   YOB: 1970   Date of Visit: 7/25/2025       To Whom It May Concern:    It is my medical opinion that Mayito Bassett may return to work in one day.            Sincerely,        NITHIN Camejo    CC: No Recipients

## 2025-07-25 NOTE — PROGRESS NOTES
NITHIN Camejo  [facility]   Family Medicine  [address]  [place] [address]  Phone: [phone]  Fax: [phone]         Chief Complaint:  Chief Complaint   Patient presents with    Hand Pain        History:  Mayito Bassett is a 54 y.o. male that is an established patient. He  is here for evaluation of the above complaint.    Hand Pain        History of Present Illness  The patient presents for evaluation of hand pain.    He reports experiencing pain in his hands, with occasional joint locking. The locking is not simultaneous in all joints but can occur in any of them. He recalls an incident where his thumb locked, causing sharp shooting pain until it spontaneously loosened. This issue affects both hands and all fingers, though not consistently the same ones. He also experiences pain between episodes of locking. Currently, he is experiencing pain in two fingers. He was previously prescribed arthritis medication, but this was discontinued when he changed doctors due to relocation. He was also provided with a wrist brace for his right hand, which he has not used recently. He reports no swelling in his joints or knuckles.     He is a  and works 4 to 10-hour days. He finds it difficult to take time off work due to financial constraints. He takes cyclobenzaprine once every night, which does not alleviate his hand pain. He also takes naproxen and aspirin. He does not recall the last time he took oral steroids. He had a ganglion cyst on his left wrist a few years ago, which was removed. He currently does not have any ganglion cysts or lumps. He has experienced recent episodes of excessive thirst, which he attributes to the heat.    Occupations:     PAST SURGICAL HISTORY:  Ganglion cyst removal from the left wrist a few years ago.      Results  Labs   - Potassium: 04/2025, Low   - Kidney function: 04/2025, Normal       ROS:  Review of Systems  "  Constitutional: Negative.    HENT: Negative.     Respiratory: Negative.     Musculoskeletal:         Bilateral hand pain, cramping         reports that he has been smoking cigarettes. He has a 37 pack-year smoking history. He has never used smokeless tobacco. He reports current alcohol use. He reports that he does not use drugs.    Current Outpatient Medications   Medication Instructions    aspirin 81 mg, Oral, Daily    bacitracin 500 UNIT/GM ointment Topical, 2 Times Daily, Under the left eye    cyclobenzaprine (FLEXERIL) 10 mg, Oral, Nightly PRN    dapagliflozin Propanediol (FARXIGA) 10 mg, Oral, Daily    diclofenac (VOLTAREN) 50 mg, Oral, 2 Times Daily PRN    erythromycin (ROMYCIN) 5 MG/GM ophthalmic ointment Left Eye, Every 4 Hours While Awake    famotidine (PEPCID) 20 mg, Oral, 2 Times Daily    gabapentin (NEURONTIN) 400 MG capsule 1 capsule, 3 times daily    levETIRAcetam (KEPPRA) 1,000 mg, Oral, 2 Times Daily    lidocaine (LIDODERM) 5 % 1 patch, Transdermal, Every 24 Hours, Remove & Discard patch within 12 hours or as directed by MD    methylPREDNISolone (MEDROL) 4 MG dose pack Take as directed on package instructions.    Multiple Vitamin (MULTI VITAMIN MENS PO) Take  by mouth.    naproxen (EC NAPROSYN) 500 mg, Oral, 2 Times Daily PRN    sacubitril-valsartan (ENTRESTO) 49-51 MG tablet 1 tablet, Oral, 2 Times Daily    spironolactone (ALDACTONE) 25 mg, Oral, Daily    tiZANidine (ZANAFLEX) 4 mg, Oral, Every 8 Hours PRN    traZODone (DESYREL) 100 MG tablet Take one tablet at bedtime    zonisamide (ZONEGRAN) 100 mg, Oral, 3 times daily       OBJECTIVE:  /72   Pulse 64   Temp 97 °F (36.1 °C)   Ht 172.7 cm (67.99\")   Wt 100 kg (221 lb)   SpO2 97%   BMI 33.61 kg/m²    Physical Exam  Vitals and nursing note reviewed.   Constitutional:       Appearance: Normal appearance.   HENT:      Head: Normocephalic and atraumatic.      Nose: Nose normal.   Eyes:      Conjunctiva/sclera: Conjunctivae normal. "   Cardiovascular:      Rate and Rhythm: Normal rate and regular rhythm.   Pulmonary:      Effort: Pulmonary effort is normal. No respiratory distress.      Breath sounds: Normal breath sounds. No wheezing or rales.   Musculoskeletal:      Right hand: Normal. No swelling, tenderness or bony tenderness. Normal range of motion. Normal strength. Normal sensation. Normal capillary refill. Normal pulse.      Left hand: Normal. No swelling, tenderness or bony tenderness. Normal range of motion. Normal strength. Normal sensation. Normal capillary refill. Normal pulse.   Neurological:      General: No focal deficit present.      Mental Status: He is alert and oriented to person, place, and time.   Psychiatric:         Mood and Affect: Mood normal.         Behavior: Behavior normal.       Physical Exam  Musculoskeletal: No swollen joints or knuckles noted.    Procedures    Assessment/Plan:     Diagnoses and all orders for this visit:    1. Bilateral hand pain (Primary)  -     Magnesium; Future  -     Comprehensive metabolic panel; Future  -     methylPREDNISolone (MEDROL) 4 MG dose pack; Take as directed on package instructions.  Dispense: 21 tablet; Refill: 0           Assessment & Plan  1. Hand pain.  - The patient's hand pain is not indicative of trigger finger as it affects all fingers and both hands intermittently.  - The possibility of rheumatoid arthritis is also unlikely due to the absence of swollen joints or knuckles. His potassium levels were slightly low during the last check in 04/2025, but kidney function was normal.  - A lab order will be placed to assess electrolyte levels, including potassium, magnesium, and sodium.  - A prescription for an oral steroid dose pack will be provided to manage inflammation and pain. He is advised to continue taking naproxen, cyclobenzaprine, and gabapentin. A work note will be provided for today to allow him time to rest, complete his lab work, and collect his medication.    An  After Visit Summary was printed and given to the patient at discharge.  Return for Next scheduled follow up.       There are no Patient Instructions on file for this visit.      Discussion:     I spent 32 minutes caring for Mayito on this date of service. This time includes time spent by me in the following activities: preparing for the visit, reviewing tests, performing a medically appropriate examination and/or evaluation, counseling and educating the patient/family/caregiver, documenting information in the medical record, independently interpreting results and communicating that information with the patient/family/caregiver, care coordination, ordering medications, obtaining a separately obtained history, and reviewing a separately obtained history   Patient or patient representative verbalized consent for the use of Ambient Listening during the visit with  NITHIN Camejo for chart documentation. 7/25/2025  12:52 CDT    My WELLER 7/25/2025   Electronically signed.

## 2025-08-16 ENCOUNTER — HOSPITAL ENCOUNTER (EMERGENCY)
Age: 55
Discharge: HOME | End: 2025-08-16
Attending: EMERGENCY MEDICINE
Payer: MEDICAID

## 2025-08-16 VITALS
TEMPERATURE: 98.5 F | OXYGEN SATURATION: 95 % | WEIGHT: 224 LBS | BODY MASS INDEX: 34.06 KG/M2 | SYSTOLIC BLOOD PRESSURE: 131 MMHG | HEART RATE: 79 BPM | RESPIRATION RATE: 18 BRPM | DIASTOLIC BLOOD PRESSURE: 74 MMHG

## 2025-08-16 DIAGNOSIS — T23.161A SUPERFICIAL BURN OF BACK OF RIGHT HAND, INITIAL ENCOUNTER: Primary | ICD-10-CM

## 2025-08-16 PROCEDURE — 99283 EMERGENCY DEPT VISIT LOW MDM: CPT

## 2025-08-16 PROCEDURE — 6370000000 HC RX 637 (ALT 250 FOR IP): Performed by: EMERGENCY MEDICINE

## 2025-08-16 PROCEDURE — 16000 INITIAL TREATMENT OF BURN(S): CPT

## 2025-08-16 RX ORDER — NEOMYCIN/BACITRACIN/POLYMYXINB 3.5-400-5K
OINTMENT (GRAM) TOPICAL ONCE
Status: DISCONTINUED | OUTPATIENT
Start: 2025-08-16 | End: 2025-08-16

## 2025-08-16 RX ORDER — NEOMYCIN/BACITRACIN/POLYMYXINB 3.5-400-5K
OINTMENT (GRAM) TOPICAL ONCE
Status: COMPLETED | OUTPATIENT
Start: 2025-08-16 | End: 2025-08-16

## 2025-08-16 RX ORDER — HYDROCODONE BITARTRATE AND ACETAMINOPHEN 5; 325 MG/1; MG/1
1 TABLET ORAL EVERY 6 HOURS PRN
Qty: 8 TABLET | Refills: 0 | Status: SHIPPED | OUTPATIENT
Start: 2025-08-16 | End: 2025-08-18

## 2025-08-16 RX ADMIN — BACITRACIN ZINC, NEOMYCIN, POLYMYXIN B: 400; 3.5; 5 OINTMENT TOPICAL at 16:23

## 2025-08-16 RX ADMIN — IBUPROFEN 600 MG: 400 TABLET, FILM COATED ORAL at 16:23

## 2025-08-16 ASSESSMENT — ENCOUNTER SYMPTOMS
VOMITING: 0
DIARRHEA: 0
ABDOMINAL PAIN: 0
NAUSEA: 0
SHORTNESS OF BREATH: 0

## 2025-08-16 ASSESSMENT — PAIN DESCRIPTION - ORIENTATION: ORIENTATION: RIGHT

## 2025-08-16 ASSESSMENT — PAIN DESCRIPTION - LOCATION: LOCATION: HAND

## 2025-08-16 ASSESSMENT — PAIN SCALES - GENERAL: PAINLEVEL_OUTOF10: 6

## 2025-08-16 ASSESSMENT — PAIN - FUNCTIONAL ASSESSMENT: PAIN_FUNCTIONAL_ASSESSMENT: 0-10

## 2025-08-16 ASSESSMENT — PAIN DESCRIPTION - DESCRIPTORS: DESCRIPTORS: THROBBING

## (undated) DEVICE — CATH DIAG EXPO .045 FL3  5F 100CM

## (undated) DEVICE — GW PERIPH GUIDERIGHT STD/EXCHNG/J/TIP SS 0.035IN 5X260CM

## (undated) DEVICE — MODEL BT2000 P/N 700287-012KIT CONTENTS: MANIFOLD WITH SALINE AND CONTRAST PORTS, SALINE TUBING WITH SPIKE AND HAND SYRINGE, TRANSDUCER: Brand: BT2000 AUTOMATED MANIFOLD KIT

## (undated) DEVICE — MODEL AT P65, P/N 701554-001KIT CONTENTS: HAND CONTROLLER, 3-WAY HIGH-PRESSURE STOPCOCK WITH ROTATING END AND PREMIUM HIGH-PRESSURE TUBING: Brand: ANGIOTOUCH® KIT

## (undated) DEVICE — CVR PROB ULTRASND/TRANSD W/GEL 7X11IN STRL

## (undated) DEVICE — GLIDESHEATH SLENDER STAINLESS STEEL KIT: Brand: GLIDESHEATH SLENDER

## (undated) DEVICE — PK CATH CARD 10

## (undated) DEVICE — CATH DIAG EXPO .045 MPA1 5F 100CM

## (undated) DEVICE — DEV COMP RAD PRELUDESYNC 24CM

## (undated) DEVICE — CATH DIAG EXPO M/ PK 5F FL4/FR4 PIG